# Patient Record
Sex: MALE | Race: OTHER | NOT HISPANIC OR LATINO | URBAN - METROPOLITAN AREA
[De-identification: names, ages, dates, MRNs, and addresses within clinical notes are randomized per-mention and may not be internally consistent; named-entity substitution may affect disease eponyms.]

---

## 2023-10-21 ENCOUNTER — INPATIENT (INPATIENT)
Facility: HOSPITAL | Age: 58
LOS: 10 days | Discharge: HOME | DRG: 981 | End: 2023-11-01
Attending: PSYCHIATRY & NEUROLOGY | Admitting: STUDENT IN AN ORGANIZED HEALTH CARE EDUCATION/TRAINING PROGRAM
Payer: COMMERCIAL

## 2023-10-21 VITALS
WEIGHT: 199.96 LBS | RESPIRATION RATE: 16 BRPM | HEART RATE: 78 BPM | TEMPERATURE: 98 F | OXYGEN SATURATION: 96 % | HEIGHT: 72 IN | DIASTOLIC BLOOD PRESSURE: 152 MMHG | SYSTOLIC BLOOD PRESSURE: 200 MMHG

## 2023-10-21 LAB
ANION GAP SERPL CALC-SCNC: 13 MMOL/L — SIGNIFICANT CHANGE UP (ref 5–17)
ANION GAP SERPL CALC-SCNC: 13 MMOL/L — SIGNIFICANT CHANGE UP (ref 5–17)
APPEARANCE UR: CLEAR — SIGNIFICANT CHANGE UP
APPEARANCE UR: CLEAR — SIGNIFICANT CHANGE UP
BASOPHILS # BLD AUTO: 0.04 K/UL — SIGNIFICANT CHANGE UP (ref 0–0.2)
BASOPHILS # BLD AUTO: 0.04 K/UL — SIGNIFICANT CHANGE UP (ref 0–0.2)
BASOPHILS NFR BLD AUTO: 0.5 % — SIGNIFICANT CHANGE UP (ref 0–2)
BASOPHILS NFR BLD AUTO: 0.5 % — SIGNIFICANT CHANGE UP (ref 0–2)
BILIRUB UR-MCNC: NEGATIVE — SIGNIFICANT CHANGE UP
BILIRUB UR-MCNC: NEGATIVE — SIGNIFICANT CHANGE UP
BUN SERPL-MCNC: 11 MG/DL — SIGNIFICANT CHANGE UP (ref 7–23)
BUN SERPL-MCNC: 11 MG/DL — SIGNIFICANT CHANGE UP (ref 7–23)
CALCIUM SERPL-MCNC: 9.7 MG/DL — SIGNIFICANT CHANGE UP (ref 8.4–10.5)
CALCIUM SERPL-MCNC: 9.7 MG/DL — SIGNIFICANT CHANGE UP (ref 8.4–10.5)
CHLORIDE SERPL-SCNC: 104 MMOL/L — SIGNIFICANT CHANGE UP (ref 96–108)
CHLORIDE SERPL-SCNC: 104 MMOL/L — SIGNIFICANT CHANGE UP (ref 96–108)
CO2 SERPL-SCNC: 24 MMOL/L — SIGNIFICANT CHANGE UP (ref 22–31)
CO2 SERPL-SCNC: 24 MMOL/L — SIGNIFICANT CHANGE UP (ref 22–31)
COLOR SPEC: YELLOW — SIGNIFICANT CHANGE UP
COLOR SPEC: YELLOW — SIGNIFICANT CHANGE UP
CREAT SERPL-MCNC: 0.94 MG/DL — SIGNIFICANT CHANGE UP (ref 0.5–1.3)
CREAT SERPL-MCNC: 0.94 MG/DL — SIGNIFICANT CHANGE UP (ref 0.5–1.3)
DIFF PNL FLD: NEGATIVE — SIGNIFICANT CHANGE UP
DIFF PNL FLD: NEGATIVE — SIGNIFICANT CHANGE UP
EGFR: 93 ML/MIN/1.73M2 — SIGNIFICANT CHANGE UP
EGFR: 93 ML/MIN/1.73M2 — SIGNIFICANT CHANGE UP
EOSINOPHIL # BLD AUTO: 0.06 K/UL — SIGNIFICANT CHANGE UP (ref 0–0.5)
EOSINOPHIL # BLD AUTO: 0.06 K/UL — SIGNIFICANT CHANGE UP (ref 0–0.5)
EOSINOPHIL NFR BLD AUTO: 0.7 % — SIGNIFICANT CHANGE UP (ref 0–6)
EOSINOPHIL NFR BLD AUTO: 0.7 % — SIGNIFICANT CHANGE UP (ref 0–6)
GLUCOSE BLDC GLUCOMTR-MCNC: 114 MG/DL — HIGH (ref 70–99)
GLUCOSE BLDC GLUCOMTR-MCNC: 114 MG/DL — HIGH (ref 70–99)
GLUCOSE SERPL-MCNC: 106 MG/DL — HIGH (ref 70–99)
GLUCOSE SERPL-MCNC: 106 MG/DL — HIGH (ref 70–99)
GLUCOSE UR QL: NEGATIVE MG/DL — SIGNIFICANT CHANGE UP
GLUCOSE UR QL: NEGATIVE MG/DL — SIGNIFICANT CHANGE UP
HCT VFR BLD CALC: 46.6 % — SIGNIFICANT CHANGE UP (ref 39–50)
HCT VFR BLD CALC: 46.6 % — SIGNIFICANT CHANGE UP (ref 39–50)
HGB BLD-MCNC: 16.7 G/DL — SIGNIFICANT CHANGE UP (ref 13–17)
HGB BLD-MCNC: 16.7 G/DL — SIGNIFICANT CHANGE UP (ref 13–17)
IMM GRANULOCYTES NFR BLD AUTO: 0.2 % — SIGNIFICANT CHANGE UP (ref 0–0.9)
IMM GRANULOCYTES NFR BLD AUTO: 0.2 % — SIGNIFICANT CHANGE UP (ref 0–0.9)
KETONES UR-MCNC: NEGATIVE MG/DL — SIGNIFICANT CHANGE UP
KETONES UR-MCNC: NEGATIVE MG/DL — SIGNIFICANT CHANGE UP
LEUKOCYTE ESTERASE UR-ACNC: NEGATIVE — SIGNIFICANT CHANGE UP
LEUKOCYTE ESTERASE UR-ACNC: NEGATIVE — SIGNIFICANT CHANGE UP
LYMPHOCYTES # BLD AUTO: 1.76 K/UL — SIGNIFICANT CHANGE UP (ref 1–3.3)
LYMPHOCYTES # BLD AUTO: 1.76 K/UL — SIGNIFICANT CHANGE UP (ref 1–3.3)
LYMPHOCYTES # BLD AUTO: 21.6 % — SIGNIFICANT CHANGE UP (ref 13–44)
LYMPHOCYTES # BLD AUTO: 21.6 % — SIGNIFICANT CHANGE UP (ref 13–44)
MCHC RBC-ENTMCNC: 32.7 PG — SIGNIFICANT CHANGE UP (ref 27–34)
MCHC RBC-ENTMCNC: 32.7 PG — SIGNIFICANT CHANGE UP (ref 27–34)
MCHC RBC-ENTMCNC: 35.8 GM/DL — SIGNIFICANT CHANGE UP (ref 32–36)
MCHC RBC-ENTMCNC: 35.8 GM/DL — SIGNIFICANT CHANGE UP (ref 32–36)
MCV RBC AUTO: 91.2 FL — SIGNIFICANT CHANGE UP (ref 80–100)
MCV RBC AUTO: 91.2 FL — SIGNIFICANT CHANGE UP (ref 80–100)
MONOCYTES # BLD AUTO: 0.54 K/UL — SIGNIFICANT CHANGE UP (ref 0–0.9)
MONOCYTES # BLD AUTO: 0.54 K/UL — SIGNIFICANT CHANGE UP (ref 0–0.9)
MONOCYTES NFR BLD AUTO: 6.6 % — SIGNIFICANT CHANGE UP (ref 2–14)
MONOCYTES NFR BLD AUTO: 6.6 % — SIGNIFICANT CHANGE UP (ref 2–14)
NEUTROPHILS # BLD AUTO: 5.74 K/UL — SIGNIFICANT CHANGE UP (ref 1.8–7.4)
NEUTROPHILS # BLD AUTO: 5.74 K/UL — SIGNIFICANT CHANGE UP (ref 1.8–7.4)
NEUTROPHILS NFR BLD AUTO: 70.4 % — SIGNIFICANT CHANGE UP (ref 43–77)
NEUTROPHILS NFR BLD AUTO: 70.4 % — SIGNIFICANT CHANGE UP (ref 43–77)
NITRITE UR-MCNC: NEGATIVE — SIGNIFICANT CHANGE UP
NITRITE UR-MCNC: NEGATIVE — SIGNIFICANT CHANGE UP
NRBC # BLD: 0 /100 WBCS — SIGNIFICANT CHANGE UP (ref 0–0)
NRBC # BLD: 0 /100 WBCS — SIGNIFICANT CHANGE UP (ref 0–0)
PH UR: 7 — SIGNIFICANT CHANGE UP (ref 5–8)
PH UR: 7 — SIGNIFICANT CHANGE UP (ref 5–8)
PLATELET # BLD AUTO: 279 K/UL — SIGNIFICANT CHANGE UP (ref 150–400)
PLATELET # BLD AUTO: 279 K/UL — SIGNIFICANT CHANGE UP (ref 150–400)
POTASSIUM SERPL-MCNC: 4 MMOL/L — SIGNIFICANT CHANGE UP (ref 3.5–5.3)
POTASSIUM SERPL-MCNC: 4 MMOL/L — SIGNIFICANT CHANGE UP (ref 3.5–5.3)
POTASSIUM SERPL-SCNC: 4 MMOL/L — SIGNIFICANT CHANGE UP (ref 3.5–5.3)
POTASSIUM SERPL-SCNC: 4 MMOL/L — SIGNIFICANT CHANGE UP (ref 3.5–5.3)
PROT UR-MCNC: NEGATIVE MG/DL — SIGNIFICANT CHANGE UP
PROT UR-MCNC: NEGATIVE MG/DL — SIGNIFICANT CHANGE UP
RBC # BLD: 5.11 M/UL — SIGNIFICANT CHANGE UP (ref 4.2–5.8)
RBC # BLD: 5.11 M/UL — SIGNIFICANT CHANGE UP (ref 4.2–5.8)
RBC # FLD: 12.3 % — SIGNIFICANT CHANGE UP (ref 10.3–14.5)
RBC # FLD: 12.3 % — SIGNIFICANT CHANGE UP (ref 10.3–14.5)
SODIUM SERPL-SCNC: 141 MMOL/L — SIGNIFICANT CHANGE UP (ref 135–145)
SODIUM SERPL-SCNC: 141 MMOL/L — SIGNIFICANT CHANGE UP (ref 135–145)
SP GR SPEC: 1.02 — SIGNIFICANT CHANGE UP (ref 1–1.03)
SP GR SPEC: 1.02 — SIGNIFICANT CHANGE UP (ref 1–1.03)
TROPONIN T, HIGH SENSITIVITY RESULT: 9 NG/L — SIGNIFICANT CHANGE UP (ref 0–51)
TROPONIN T, HIGH SENSITIVITY RESULT: 9 NG/L — SIGNIFICANT CHANGE UP (ref 0–51)
UROBILINOGEN FLD QL: 0.2 MG/DL — SIGNIFICANT CHANGE UP (ref 0.2–1)
UROBILINOGEN FLD QL: 0.2 MG/DL — SIGNIFICANT CHANGE UP (ref 0.2–1)
WBC # BLD: 8.16 K/UL — SIGNIFICANT CHANGE UP (ref 3.8–10.5)
WBC # BLD: 8.16 K/UL — SIGNIFICANT CHANGE UP (ref 3.8–10.5)
WBC # FLD AUTO: 8.16 K/UL — SIGNIFICANT CHANGE UP (ref 3.8–10.5)
WBC # FLD AUTO: 8.16 K/UL — SIGNIFICANT CHANGE UP (ref 3.8–10.5)

## 2023-10-21 PROCEDURE — 70498 CT ANGIOGRAPHY NECK: CPT | Mod: 26,MA

## 2023-10-21 PROCEDURE — 70496 CT ANGIOGRAPHY HEAD: CPT | Mod: 26,MA

## 2023-10-21 PROCEDURE — 99285 EMERGENCY DEPT VISIT HI MDM: CPT

## 2023-10-21 PROCEDURE — 93010 ELECTROCARDIOGRAM REPORT: CPT

## 2023-10-21 PROCEDURE — 70450 CT HEAD/BRAIN W/O DYE: CPT | Mod: 26,76,59

## 2023-10-21 PROCEDURE — 0042T: CPT

## 2023-10-21 RX ORDER — ENOXAPARIN SODIUM 100 MG/ML
40 INJECTION SUBCUTANEOUS EVERY 24 HOURS
Refills: 0 | Status: DISCONTINUED | OUTPATIENT
Start: 2023-10-21 | End: 2023-10-24

## 2023-10-21 RX ORDER — INFLUENZA VIRUS VACCINE 15; 15; 15; 15 UG/.5ML; UG/.5ML; UG/.5ML; UG/.5ML
0.5 SUSPENSION INTRAMUSCULAR ONCE
Refills: 0 | Status: COMPLETED | OUTPATIENT
Start: 2023-10-21 | End: 2023-10-21

## 2023-10-21 RX ORDER — SODIUM CHLORIDE 9 MG/ML
1000 INJECTION INTRAMUSCULAR; INTRAVENOUS; SUBCUTANEOUS ONCE
Refills: 0 | Status: COMPLETED | OUTPATIENT
Start: 2023-10-21 | End: 2023-10-21

## 2023-10-21 RX ORDER — ASPIRIN/CALCIUM CARB/MAGNESIUM 324 MG
81 TABLET ORAL DAILY
Refills: 0 | Status: DISCONTINUED | OUTPATIENT
Start: 2023-10-21 | End: 2023-10-22

## 2023-10-21 RX ORDER — MECLIZINE HCL 12.5 MG
25 TABLET ORAL ONCE
Refills: 0 | Status: COMPLETED | OUTPATIENT
Start: 2023-10-21 | End: 2023-10-21

## 2023-10-21 RX ORDER — HYDRALAZINE HCL 50 MG
20 TABLET ORAL ONCE
Refills: 0 | Status: COMPLETED | OUTPATIENT
Start: 2023-10-21 | End: 2023-10-21

## 2023-10-21 RX ORDER — LABETALOL HCL 100 MG
10 TABLET ORAL ONCE
Refills: 0 | Status: COMPLETED | OUTPATIENT
Start: 2023-10-21 | End: 2023-10-21

## 2023-10-21 RX ORDER — NICARDIPINE HYDROCHLORIDE 30 MG/1
5 CAPSULE, EXTENDED RELEASE ORAL
Qty: 40 | Refills: 0 | Status: DISCONTINUED | OUTPATIENT
Start: 2023-10-21 | End: 2023-10-22

## 2023-10-21 RX ORDER — LABETALOL HCL 100 MG
20 TABLET ORAL ONCE
Refills: 0 | Status: COMPLETED | OUTPATIENT
Start: 2023-10-21 | End: 2023-10-21

## 2023-10-21 RX ORDER — AMLODIPINE BESYLATE 2.5 MG/1
2.5 TABLET ORAL DAILY
Refills: 0 | Status: DISCONTINUED | OUTPATIENT
Start: 2023-10-21 | End: 2023-10-22

## 2023-10-21 RX ORDER — NICARDIPINE HYDROCHLORIDE 30 MG/1
5 CAPSULE, EXTENDED RELEASE ORAL
Qty: 40 | Refills: 0 | Status: DISCONTINUED | OUTPATIENT
Start: 2023-10-21 | End: 2023-10-21

## 2023-10-21 RX ORDER — CLOPIDOGREL BISULFATE 75 MG/1
75 TABLET, FILM COATED ORAL DAILY
Refills: 0 | Status: DISCONTINUED | OUTPATIENT
Start: 2023-10-21 | End: 2023-10-22

## 2023-10-21 RX ORDER — ATORVASTATIN CALCIUM 80 MG/1
80 TABLET, FILM COATED ORAL AT BEDTIME
Refills: 0 | Status: DISCONTINUED | OUTPATIENT
Start: 2023-10-21 | End: 2023-10-31

## 2023-10-21 RX ORDER — HYDRALAZINE HCL 50 MG
10 TABLET ORAL ONCE
Refills: 0 | Status: COMPLETED | OUTPATIENT
Start: 2023-10-21 | End: 2023-10-21

## 2023-10-21 RX ADMIN — Medication 25 MILLIGRAM(S): at 12:02

## 2023-10-21 RX ADMIN — Medication 81 MILLIGRAM(S): at 18:51

## 2023-10-21 RX ADMIN — Medication 20 MILLIGRAM(S): at 14:40

## 2023-10-21 RX ADMIN — Medication 10 MILLIGRAM(S): at 14:11

## 2023-10-21 RX ADMIN — NICARDIPINE HYDROCHLORIDE 25 MG/HR: 30 CAPSULE, EXTENDED RELEASE ORAL at 15:52

## 2023-10-21 RX ADMIN — CLOPIDOGREL BISULFATE 75 MILLIGRAM(S): 75 TABLET, FILM COATED ORAL at 18:51

## 2023-10-21 RX ADMIN — ENOXAPARIN SODIUM 40 MILLIGRAM(S): 100 INJECTION SUBCUTANEOUS at 18:44

## 2023-10-21 RX ADMIN — SODIUM CHLORIDE 1000 MILLILITER(S): 9 INJECTION INTRAMUSCULAR; INTRAVENOUS; SUBCUTANEOUS at 12:02

## 2023-10-21 RX ADMIN — Medication 10 MILLIGRAM(S): at 12:02

## 2023-10-21 RX ADMIN — ATORVASTATIN CALCIUM 80 MILLIGRAM(S): 80 TABLET, FILM COATED ORAL at 21:43

## 2023-10-21 RX ADMIN — NICARDIPINE HYDROCHLORIDE 25 MG/HR: 30 CAPSULE, EXTENDED RELEASE ORAL at 18:59

## 2023-10-21 RX ADMIN — Medication 20 MILLIGRAM(S): at 13:04

## 2023-10-21 NOTE — H&P ADULT - NSHPLABSRESULTS_GEN_ALL_CORE
LABS:                         16.7   8.16  )-----------( 279      ( 21 Oct 2023 11:45 )             46.6     10-    141  |  104  |  11  ----------------------------<  106<H>  4.0   |  24  |  0.94    Ca    9.7      21 Oct 2023 11:45        Urinalysis Basic - ( 21 Oct 2023 11:45 )    Color: Yellow / Appearance: Clear / S.019 / pH: x  Gluc: 106 mg/dL / Ketone: Negative mg/dL  / Bili: Negative / Urobili: 0.2 mg/dL   Blood: x / Protein: Negative mg/dL / Nitrite: Negative   Leuk Esterase: Negative / RBC: x / WBC x   Sq Epi: x / Non Sq Epi: x / Bacteria: x        RADIOLOGY, EKG & ADDITIONAL TESTS: Reviewed.

## 2023-10-21 NOTE — H&P ADULT - HISTORY OF PRESENT ILLNESS
HPI: 57y Male with PMHx of HTN (noncompliant with meds) originally presented to [facility] ED c/o room spinning dizziness x 1 week worse with head movement. CTH with no acute intracranial injury. Microvascular disease. Chronic lacunar infarcts. CTA head and neck with mild dot-appearance to the bilateral ACAs and MCAs that may represent vasculitis. Short severe segmental narrowing of the left proximal A2 segment of the anterior cerebral artery. Severe focal narrowing of the left M2/M3 segment of the middle cerebral artery. Normal CTA of the extracranial circulation. No evidence of carotid stenosis. Was given total of 30mg labetalol and 30mg hydralazine with SBP still 230 and so cardene gtt was started. Given imaging findings, stroke was consulted. Upon initial examination, NIHSS 0. Was awaiting MICU bed for admission under the stroke service for a stroke workup.    A few mins later received call from patient's nurse that patient was now confused and was not speaking as much. BP at the time 130s. Upon my eval, NIHSS 2 for confusion/aphasia (unable to say where he is or the year) and hence stroke code was called. CT imaging unchanged. Exam slowly improving with SBP in the low 200s. Discussed case with Dr. Myles, since exam is improving and no change in imaging, likely symptoms are due to significant drop in BP.    T(C): 36.6 (10-21-23 @ 11:37), Max: 36.6 (10-21-23 @ 11:37)  HR: 95 (10-21-23 @ 18:20) (78 - 112)  BP: 233/138 (10-21-23 @ 18:20) (130/89 - 236/141)  RR: 20 (10-21-23 @ 18:20) (16 - 28)  SpO2: 98% (10-21-23 @ 18:20) (96% - 100%)    PAST MEDICAL & SURGICAL HISTORY:  HTN (hypertension)    FAMILY HISTORY:

## 2023-10-21 NOTE — CONSULT NOTE ADULT - SUBJECTIVE AND OBJECTIVE BOX
**STROKE CONSULT NOTE**    HPI: 57y Male with PMHx of HTN (noncompliant with meds) originally presented to [facility] ED c/o room spinning dizziness x 1 week worse with head movement. CTH with no acute intracranial injury. Microvascular disease. Chronic lacunar infarcts. CTA head and neck with mild dot-appearance to the bilateral ACAs and MCAs that may represent vasculitis. Short severe segmental narrowing of the left proximal A2 segment of the anterior cerebral artery. Severe focal narrowing of the left M2/M3 segment of the middle cerebral artery. Normal CTA of the extracranial circulation. No evidence of carotid stenosis. Was given total of 30mg labetalol and 30mg hydralazine with SBP still 230 and so cardene gtt was started. Given imaging findings, stroke was consulted. Upon initial examination, NIHSS 0. Was awaiting MICU bed for admission under the stroke service for a stroke workup.    A few mins later received call from patient's nurse that patient was now confused and was not speaking as much. BP at the time 130s. Upon my eval, NIHSS 2 for confusion/aphasia (unable to say where he is or the year) and hence stroke code was called. CT imaging unchanged. Exam slowly improving with SBP in the low 200s. Discussed case with Dr. Myles, since exam is improving and no change in imaging, likely symptoms are due to significant drop in BP.    T(C): 36.6 (10-21-23 @ 11:37), Max: 36.6 (10-21-23 @ 11:37)  HR: 95 (10-21-23 @ 18:20) (78 - 112)  BP: 233/138 (10-21-23 @ 18:20) (130/89 - 236/141)  RR: 20 (10-21-23 @ 18:20) (16 - 28)  SpO2: 98% (10-21-23 @ 18:20) (96% - 100%)    PAST MEDICAL & SURGICAL HISTORY:  HTN (hypertension)    FAMILY HISTORY:    ROS:   see HPI    MEDICATIONS  (STANDING):  amLODIPine   Tablet 2.5 milliGRAM(s) Oral daily  aspirin enteric coated 81 milliGRAM(s) Oral daily  atorvastatin 80 milliGRAM(s) Oral at bedtime  clopidogrel Tablet 75 milliGRAM(s) Oral daily  enoxaparin Injectable 40 milliGRAM(s) SubCutaneous every 24 hours  niCARdipine Infusion 5 mG/Hr (25 mL/Hr) IV Continuous <Continuous>    MEDICATIONS  (PRN):    Allergies    No Known Allergies    Intolerances      Vital Signs Last 24 Hrs  T(C): 36.6 (21 Oct 2023 11:37), Max: 36.6 (21 Oct 2023 11:37)  T(F): 97.8 (21 Oct 2023 11:37), Max: 97.8 (21 Oct 2023 11:37)  HR: 95 (21 Oct 2023 18:20) (78 - 112)  BP: 233/138 (21 Oct 2023 18:20) (130/89 - 236/141)  BP(mean): 176 (21 Oct 2023 18:20) (176 - 182)  RR: 20 (21 Oct 2023 18:20) (16 - 28)  SpO2: 98% (21 Oct 2023 18:20) (96% - 100%)    Parameters below as of 21 Oct 2023 18:20  Patient On (Oxygen Delivery Method): room air    Neurologic: at time of initial eval  -Mental status: Awake, alert, oriented to person, place, and time. Speech is fluent with intact naming, repetition, and comprehension, no dysarthria. Recent and remote memory intact. Follows commands. Attention/concentration intact.   -Cranial nerves:   II: Visual fields are full to confrontation.  III, IV, VI: Extraocular movements are intact without nystagmus. Pupils equally round and reactive to light  V:  Facial sensation V1-V3 equal and intact   VII: Face is symmetric with normal eye closure and smile  VIII: Hearing is bilaterally intact to finger rub  IX, X: Uvula is midline and soft palate rises symmetrically  XI: Head turning and shoulder shrug are intact.  XII: Tongue protrudes midline  Motor: Normal bulk and tone. No pronator drift. Strength bilateral upper extremity 5/5, bilateral lower extremities 5/5.  Sensation: Intact to light touch bilaterally. No neglect or extinction on double simultaneous testing.  Coordination: No dysmetria on finger-to-nose bilaterally  Gait: Narrow gait and steady    NIHSS: 0    Neurologic: at time of stroke code  -Mental status: Awake, alert, however staring midline and appearing confused. Oriented to person only. Speech with pauses, unable to say where he is or what year it is. No dysarthria. Recent and remote memory not intact. Follows simple commands. Attention/concentration diminished.   -Cranial nerves:   II: Visual fields are full to confrontation.  III, IV, VI: Extraocular movements are intact without nystagmus. Pupils equally round and reactive to light  V:  Facial sensation V1-V3 equal and intact   VII: Face is symmetric with normal eye closure and smile  VIII: Hearing is bilaterally intact to finger rub  IX, X: Uvula is midline and soft palate rises symmetrically  XI: Head turning and shoulder shrug are intact.  XII: Tongue protrudes midline  Motor: Normal bulk and tone. No pronator drift. Strength bilateral upper extremity 5/5, bilateral lower extremities 5/5.  Sensation: Intact to light touch bilaterally. No neglect or extinction on double simultaneous testing.  Coordination: No dysmetria on finger-to-nose bilaterally  Gait: Narrow gait and steady    NIHSS: 2    Neurologic: after stroke code with increase in BP  -Mental status: Awake, alert, appearing less confused, oriented to person, place, and time. Speech is fluent with intact naming, repetition, and comprehension, no dysarthria. Recent and remote memory intact. Follows commands. Attention/concentration improving.   -Cranial nerves:   II: Visual fields are full to confrontation.  III, IV, VI: Extraocular movements are intact without nystagmus. Pupils equally round and reactive to light  V:  Facial sensation V1-V3 equal and intact   VII: Face is symmetric with normal eye closure and smile  VIII: Hearing is bilaterally intact to finger rub  IX, X: Uvula is midline and soft palate rises symmetrically  XI: Head turning and shoulder shrug are intact.  XII: Tongue protrudes midline  Motor: Normal bulk and tone. No pronator drift. Strength bilateral upper extremity 5/5, bilateral lower extremities 5/5.  Sensation: Intact to light touch bilaterally. No neglect or extinction on double simultaneous testing.  Coordination: No dysmetria on finger-to-nose bilaterally  Gait: Narrow gait and steady    NIHSS: 0    Fingerstick Blood Glucose: CAPILLARY BLOOD GLUCOSE  114 (21 Oct 2023 18:17)      POCT Blood Glucose.: 114 mg/dL (21 Oct 2023 17:04)    LABS:                        16.7   8.16  )-----------( 279      ( 21 Oct 2023 11:45 )             46.6     10-    141  |  104  |  11  ----------------------------<  106<H>  4.0   |  24  |  0.94    Ca    9.7      21 Oct 2023 11:45      Urinalysis Basic - ( 21 Oct 2023 11:45 )    Color: Yellow / Appearance: Clear / S.019 / pH: x  Gluc: 106 mg/dL / Ketone: Negative mg/dL  / Bili: Negative / Urobili: 0.2 mg/dL   Blood: x / Protein: Negative mg/dL / Nitrite: Negative   Leuk Esterase: Negative / RBC: x / WBC x   Sq Epi: x / Non Sq Epi: x / Bacteria: x      RADIOLOGY & ADDITIONAL STUDIES:  reviewed    -----------------------------------------------------------------------------------------------------------------  IV-tenecteplase (Y/N):    no, exam improving and no focal deficits on exam

## 2023-10-21 NOTE — ED PROVIDER NOTE - CLINICAL SUMMARY MEDICAL DECISION MAKING FREE TEXT BOX
56 yo with dizziness, hypertensive here in setting of med noncompliance, 1 week of symptoms, + nystagmus, sways on romberg. CVA on ddx, outside therapeutic window, will obtain CT imaging. Also symptoms may be peripheral in nature, will trial meclizine. Hypertensive here, will treat BP, ro hypertensive urgency/emergency

## 2023-10-21 NOTE — ED PROVIDER NOTE - PHYSICAL EXAMINATION
General: Well appearing, in no acute distress  HEENT: Normocephalic, atraumatic, extraocular movements intact  CV: Regular rate  Pulm: No respiratory distress, no tachypnea  Abd: Flat, no gross distension  Ext: warm and well perfused  Skin: No gross rashes or lesions  Neuro: Alert and oriented, moving all extremities, motor 5/5, sensation intact bilaterally, no dysmetria, + horizontal nysyagmus

## 2023-10-21 NOTE — ED PROVIDER NOTE - NS ED ROS FT
Constitutional: No fever or chills  Eyes: No discharge or drainage  Ears, Nose, Mouth, Throat: No nasal discharge, no sore throat  Cardiovascular: no chest pain, no palpitations  Respiratory: No shortness of breath, no cough  Gastrointestinal: No nausea or vomiting, no abdominal pain, no diarrhea or constipation  Musculoskeletal: No joint pain, no swelling  Skin: No rashes or lesions  Neurological: No numbness, weakness, tingling, no headache, + dizziness

## 2023-10-21 NOTE — H&P ADULT - ASSESSMENT
57y Male with PMHx of HTN (noncompliant with meds) originally presented to [facility] ED c/o room spinning dizziness x 1 week worse with head movement. CTH with no acute intracranial injury. Microvascular disease. Chronic lacunar infarcts. CTA head and neck with mild dot-appearance to the bilateral ACAs and MCAs that may represent vasculitis. Short severe segmental narrowing of the left proximal A2 segment of the anterior cerebral artery. Severe focal narrowing of the left M2/M3 segment of the middle cerebral artery. Normal CTA of the extracranial circulation. No evidence of carotid stenosis. Was given total of 30mg labetalol and 30mg hydralazine with SBP still 230s and so cardene gtt was started. Given imaging findings, stroke was consulted. Upon initial examination, NIHSS 0. Was awaiting MICU bed for admission under the stroke service for a stroke workup. Patient then became confused/aphasic in setting of SBP 130s. Stroke code was called. CT imaging unchanged. Exam slowly improving with SBP in the low 200s. Discussed case with Dr. Myles, since exam is improving and no change in imaging, likely symptoms are due to significant drop in BP. Admitted to MICU for stroke w/u with SBP goal 180-220.    Neuro  #CVA workup  - continue aspirin 81mg and plavix 75mg daily  - continue atorvastatin 80mg daily  - q1hr stroke neuro checks and vitals  - obtain MRA brain  - Stroke Code HCT Results: No acute intracranial injury. Microvascular disease. Chronic lacunar infarcts.  - Stroke Code CTA Results: Mild dot-appearance to the bilateral ACAs and MCAs that may represent vasculitis. Short severe segmental narrowing of the left proximal A2 segment of the anterior cerebral artery, unchanged. Severe focal narrowing of the left M2/M3 segment of the middle cerebral artery, unchanged.  - Stroke education  - vasculitis panel ordered  - consider DSA    Cards  #HTN  - SBP goal 180-220  - okay to start cardene 5mg if SBP >220  - amlodipine 2.5mg started to bridge with cardene  - does not take home BP meds, does not know what they are  - obtain TTE with bubble    #HLD  - high dose statin as above in CVA  - LDL results: pending    Pulm  - call provider if SPO2 < 94%    GI  #Nutrition/Fluids/Electrolytes   - replete K<4 and Mg <2  - Diet: reg diet  - IVF: n/a    Renal  - trend BMP    Infectious Disease  - amy    Endocrine  - A1C results: pending  - TSH results: pending    DVT Prophylaxis  - lovenox sq for DVT prophylaxis   - SCDs for DVT prophylaxis     Dispo: MICU     Discussed daily hospital plans and goals with patient.     Discussed with Neurology Attending Dr. Myles

## 2023-10-21 NOTE — CONSULT NOTE ADULT - ASSESSMENT
57y Male with PMHx of HTN (noncompliant with meds) originally presented to [facility] ED c/o room spinning dizziness x 1 week worse with head movement. CTH with no acute intracranial injury. Microvascular disease. Chronic lacunar infarcts. CTA head and neck with mild dot-appearance to the bilateral ACAs and MCAs that may represent vasculitis. Short severe segmental narrowing of the left proximal A2 segment of the anterior cerebral artery. Severe focal narrowing of the left M2/M3 segment of the middle cerebral artery. Normal CTA of the extracranial circulation. No evidence of carotid stenosis. Was given total of 30mg labetalol and 30mg hydralazine with SBP still 230s and so cardene gtt was started. Given imaging findings, stroke was consulted. Upon initial examination, NIHSS 0. Was awaiting MICU bed for admission under the stroke service for a stroke workup. Patient then became confused/aphasic in setting of SBP 130s. Stroke code was called. CT imaging unchanged. Exam slowly improving with SBP in the low 200s. Discussed case with Dr. Myles, since exam is improving and no change in imaging, likely symptoms are due to significant drop in BP. Admitted to MICU for stroke w/u with SBP goal 180-220.    Neuro  #CVA workup  - continue aspirin 81mg and plavix 75mg daily  - continue atorvastatin 80mg daily  - q1hr stroke neuro checks and vitals  - obtain MRA brain  - Stroke Code HCT Results: No acute intracranial injury. Microvascular disease. Chronic lacunar infarcts.  - Stroke Code CTA Results: Mild dot-appearance to the bilateral ACAs and MCAs that may represent vasculitis. Short severe segmental narrowing of the left proximal A2 segment of the anterior cerebral artery, unchanged. Severe focal narrowing of the left M2/M3 segment of the middle cerebral artery, unchanged.  - Stroke education  - vasculitis panel ordered  - consider DSA    Cards  #HTN  - SBP goal 180-220  - okay to start cardene 5mg if SBP >220  - amlodipine 2.5mg started to bridge with cardene  - does not take home BP meds, does not know what they are  - obtain TTE with bubble    #HLD  - high dose statin as above in CVA  - LDL results: pending    Pulm  - call provider if SPO2 < 94%    GI  #Nutrition/Fluids/Electrolytes   - replete K<4 and Mg <2  - Diet: reg diet  - IVF: n/a    Renal  - trend BMP    Infectious Disease  - amy    Endocrine  - A1C results: pending  - TSH results: pending    DVT Prophylaxis  - lovenox sq for DVT prophylaxis   - SCDs for DVT prophylaxis     Dispo: MICU     Discussed daily hospital plans and goals with patient.     Discussed with Neurology Attending Dr. Myles

## 2023-10-21 NOTE — ED ADULT NURSE NOTE - OBJECTIVE STATEMENT
57 y.o. Male BIBA c/o HTN. Per pt diagnosed with hypertension and is not compliant with medication. Has felt dizzy x1 week worse with standing. Denies CP, SOB, headache, vision changes, abd pain nvd, syncope, weakness. Placed on CCM upon arrival. Clinical upgrade to MD Cardona at bedside. Denie smoking, alcohol, drug use. denies other PMHx. Denies allergies.

## 2023-10-21 NOTE — ED ADULT NURSE REASSESSMENT NOTE - NS ED NURSE REASSESS COMMENT FT1
Pt found aphasic, not responding to questions in English or Korean. MD Cardona and NASH Jones from stroke team came to bedside and assess pt. Stroke code was called, CT done. Per PA, at this time no evidence of new infarct or stenosis. Pt at this time back to baseline, A+ox3, NIH 0, answering questions and following commands. Per PA pt should not be restarted on cardizem drip unless BP systolic is over 230 and start at 5mg/h.

## 2023-10-21 NOTE — ED PROVIDER NOTE - OBJECTIVE STATEMENT
58 yo m with ho htn, noncomplaint with meds, presenting with dizziness X 1 week. Pt endorsing 1 week of dizziness, intermittent, worst with standing, feeling off balanced. No headache or blurry vision, denies cp or sob or palpitations, no lightheadedness, no neck pain or neck stiffness, no numbness, weakness, tingling. No trauma. ROS as above.

## 2023-10-22 DIAGNOSIS — I77.6 ARTERITIS, UNSPECIFIED: ICD-10-CM

## 2023-10-22 DIAGNOSIS — I10 ESSENTIAL (PRIMARY) HYPERTENSION: ICD-10-CM

## 2023-10-22 LAB
A1C WITH ESTIMATED AVERAGE GLUCOSE RESULT: 5.7 % — HIGH (ref 4–5.6)
A1C WITH ESTIMATED AVERAGE GLUCOSE RESULT: 5.7 % — HIGH (ref 4–5.6)
AMPHET UR-MCNC: NEGATIVE — SIGNIFICANT CHANGE UP
AMPHET UR-MCNC: NEGATIVE — SIGNIFICANT CHANGE UP
ANION GAP SERPL CALC-SCNC: 12 MMOL/L — SIGNIFICANT CHANGE UP (ref 5–17)
ANION GAP SERPL CALC-SCNC: 12 MMOL/L — SIGNIFICANT CHANGE UP (ref 5–17)
BARBITURATES UR SCN-MCNC: NEGATIVE — SIGNIFICANT CHANGE UP
BARBITURATES UR SCN-MCNC: NEGATIVE — SIGNIFICANT CHANGE UP
BASOPHILS # BLD AUTO: 0.04 K/UL — SIGNIFICANT CHANGE UP (ref 0–0.2)
BASOPHILS # BLD AUTO: 0.04 K/UL — SIGNIFICANT CHANGE UP (ref 0–0.2)
BASOPHILS NFR BLD AUTO: 0.6 % — SIGNIFICANT CHANGE UP (ref 0–2)
BASOPHILS NFR BLD AUTO: 0.6 % — SIGNIFICANT CHANGE UP (ref 0–2)
BENZODIAZ UR-MCNC: NEGATIVE — SIGNIFICANT CHANGE UP
BENZODIAZ UR-MCNC: NEGATIVE — SIGNIFICANT CHANGE UP
BUN SERPL-MCNC: 11 MG/DL — SIGNIFICANT CHANGE UP (ref 7–23)
BUN SERPL-MCNC: 11 MG/DL — SIGNIFICANT CHANGE UP (ref 7–23)
CALCIUM SERPL-MCNC: 9.7 MG/DL — SIGNIFICANT CHANGE UP (ref 8.4–10.5)
CALCIUM SERPL-MCNC: 9.7 MG/DL — SIGNIFICANT CHANGE UP (ref 8.4–10.5)
CHLORIDE SERPL-SCNC: 104 MMOL/L — SIGNIFICANT CHANGE UP (ref 96–108)
CHLORIDE SERPL-SCNC: 104 MMOL/L — SIGNIFICANT CHANGE UP (ref 96–108)
CHOLEST SERPL-MCNC: 217 MG/DL — HIGH
CHOLEST SERPL-MCNC: 217 MG/DL — HIGH
CO2 SERPL-SCNC: 25 MMOL/L — SIGNIFICANT CHANGE UP (ref 22–31)
CO2 SERPL-SCNC: 25 MMOL/L — SIGNIFICANT CHANGE UP (ref 22–31)
COCAINE METAB.OTHER UR-MCNC: NEGATIVE — SIGNIFICANT CHANGE UP
COCAINE METAB.OTHER UR-MCNC: NEGATIVE — SIGNIFICANT CHANGE UP
CREAT SERPL-MCNC: 1.03 MG/DL — SIGNIFICANT CHANGE UP (ref 0.5–1.3)
CREAT SERPL-MCNC: 1.03 MG/DL — SIGNIFICANT CHANGE UP (ref 0.5–1.3)
CRP SERPL-MCNC: <3 MG/L — SIGNIFICANT CHANGE UP (ref 0–4)
CRP SERPL-MCNC: <3 MG/L — SIGNIFICANT CHANGE UP (ref 0–4)
EGFR: 84 ML/MIN/1.73M2 — SIGNIFICANT CHANGE UP
EGFR: 84 ML/MIN/1.73M2 — SIGNIFICANT CHANGE UP
EOSINOPHIL # BLD AUTO: 0.06 K/UL — SIGNIFICANT CHANGE UP (ref 0–0.5)
EOSINOPHIL # BLD AUTO: 0.06 K/UL — SIGNIFICANT CHANGE UP (ref 0–0.5)
EOSINOPHIL NFR BLD AUTO: 0.9 % — SIGNIFICANT CHANGE UP (ref 0–6)
EOSINOPHIL NFR BLD AUTO: 0.9 % — SIGNIFICANT CHANGE UP (ref 0–6)
ERYTHROCYTE [SEDIMENTATION RATE] IN BLOOD: 18 MM/HR — SIGNIFICANT CHANGE UP
ERYTHROCYTE [SEDIMENTATION RATE] IN BLOOD: 18 MM/HR — SIGNIFICANT CHANGE UP
ESTIMATED AVERAGE GLUCOSE: 117 MG/DL — HIGH (ref 68–114)
ESTIMATED AVERAGE GLUCOSE: 117 MG/DL — HIGH (ref 68–114)
GLUCOSE SERPL-MCNC: 108 MG/DL — HIGH (ref 70–99)
GLUCOSE SERPL-MCNC: 108 MG/DL — HIGH (ref 70–99)
HAV IGM SER-ACNC: SIGNIFICANT CHANGE UP
HAV IGM SER-ACNC: SIGNIFICANT CHANGE UP
HBV CORE AB SER-ACNC: SIGNIFICANT CHANGE UP
HBV CORE AB SER-ACNC: SIGNIFICANT CHANGE UP
HBV CORE IGM SER-ACNC: SIGNIFICANT CHANGE UP
HBV CORE IGM SER-ACNC: SIGNIFICANT CHANGE UP
HBV SURFACE AB SER-ACNC: SIGNIFICANT CHANGE UP
HBV SURFACE AB SER-ACNC: SIGNIFICANT CHANGE UP
HBV SURFACE AG SER-ACNC: SIGNIFICANT CHANGE UP
HBV SURFACE AG SER-ACNC: SIGNIFICANT CHANGE UP
HCT VFR BLD CALC: 44.5 % — SIGNIFICANT CHANGE UP (ref 39–50)
HCT VFR BLD CALC: 44.5 % — SIGNIFICANT CHANGE UP (ref 39–50)
HCV AB S/CO SERPL IA: 0.04 S/CO — SIGNIFICANT CHANGE UP
HCV AB S/CO SERPL IA: 0.04 S/CO — SIGNIFICANT CHANGE UP
HCV AB SERPL-IMP: SIGNIFICANT CHANGE UP
HCV AB SERPL-IMP: SIGNIFICANT CHANGE UP
HDLC SERPL-MCNC: 35 MG/DL — LOW
HDLC SERPL-MCNC: 35 MG/DL — LOW
HGB BLD-MCNC: 15.8 G/DL — SIGNIFICANT CHANGE UP (ref 13–17)
HGB BLD-MCNC: 15.8 G/DL — SIGNIFICANT CHANGE UP (ref 13–17)
HIV 1+2 AB+HIV1 P24 AG SERPL QL IA: SIGNIFICANT CHANGE UP
HIV 1+2 AB+HIV1 P24 AG SERPL QL IA: SIGNIFICANT CHANGE UP
IMM GRANULOCYTES NFR BLD AUTO: 0.2 % — SIGNIFICANT CHANGE UP (ref 0–0.9)
IMM GRANULOCYTES NFR BLD AUTO: 0.2 % — SIGNIFICANT CHANGE UP (ref 0–0.9)
LACTATE SERPL-SCNC: 1.6 MMOL/L — SIGNIFICANT CHANGE UP (ref 0.5–2)
LACTATE SERPL-SCNC: 1.6 MMOL/L — SIGNIFICANT CHANGE UP (ref 0.5–2)
LIPID PNL WITH DIRECT LDL SERPL: 156 MG/DL — HIGH
LIPID PNL WITH DIRECT LDL SERPL: 156 MG/DL — HIGH
LYMPHOCYTES # BLD AUTO: 1.63 K/UL — SIGNIFICANT CHANGE UP (ref 1–3.3)
LYMPHOCYTES # BLD AUTO: 1.63 K/UL — SIGNIFICANT CHANGE UP (ref 1–3.3)
LYMPHOCYTES # BLD AUTO: 24.6 % — SIGNIFICANT CHANGE UP (ref 13–44)
LYMPHOCYTES # BLD AUTO: 24.6 % — SIGNIFICANT CHANGE UP (ref 13–44)
MAGNESIUM SERPL-MCNC: 2 MG/DL — SIGNIFICANT CHANGE UP (ref 1.6–2.6)
MAGNESIUM SERPL-MCNC: 2 MG/DL — SIGNIFICANT CHANGE UP (ref 1.6–2.6)
MCHC RBC-ENTMCNC: 32.6 PG — SIGNIFICANT CHANGE UP (ref 27–34)
MCHC RBC-ENTMCNC: 32.6 PG — SIGNIFICANT CHANGE UP (ref 27–34)
MCHC RBC-ENTMCNC: 35.5 GM/DL — SIGNIFICANT CHANGE UP (ref 32–36)
MCHC RBC-ENTMCNC: 35.5 GM/DL — SIGNIFICANT CHANGE UP (ref 32–36)
MCV RBC AUTO: 91.9 FL — SIGNIFICANT CHANGE UP (ref 80–100)
MCV RBC AUTO: 91.9 FL — SIGNIFICANT CHANGE UP (ref 80–100)
METHADONE UR-MCNC: NEGATIVE — SIGNIFICANT CHANGE UP
METHADONE UR-MCNC: NEGATIVE — SIGNIFICANT CHANGE UP
MONOCYTES # BLD AUTO: 0.56 K/UL — SIGNIFICANT CHANGE UP (ref 0–0.9)
MONOCYTES # BLD AUTO: 0.56 K/UL — SIGNIFICANT CHANGE UP (ref 0–0.9)
MONOCYTES NFR BLD AUTO: 8.5 % — SIGNIFICANT CHANGE UP (ref 2–14)
MONOCYTES NFR BLD AUTO: 8.5 % — SIGNIFICANT CHANGE UP (ref 2–14)
NEUTROPHILS # BLD AUTO: 4.32 K/UL — SIGNIFICANT CHANGE UP (ref 1.8–7.4)
NEUTROPHILS # BLD AUTO: 4.32 K/UL — SIGNIFICANT CHANGE UP (ref 1.8–7.4)
NEUTROPHILS NFR BLD AUTO: 65.2 % — SIGNIFICANT CHANGE UP (ref 43–77)
NEUTROPHILS NFR BLD AUTO: 65.2 % — SIGNIFICANT CHANGE UP (ref 43–77)
NON HDL CHOLESTEROL: 182 MG/DL — HIGH
NON HDL CHOLESTEROL: 182 MG/DL — HIGH
NRBC # BLD: 0 /100 WBCS — SIGNIFICANT CHANGE UP (ref 0–0)
NRBC # BLD: 0 /100 WBCS — SIGNIFICANT CHANGE UP (ref 0–0)
OPIATES UR-MCNC: NEGATIVE — SIGNIFICANT CHANGE UP
OPIATES UR-MCNC: NEGATIVE — SIGNIFICANT CHANGE UP
PCP SPEC-MCNC: SIGNIFICANT CHANGE UP
PCP SPEC-MCNC: SIGNIFICANT CHANGE UP
PCP UR-MCNC: NEGATIVE — SIGNIFICANT CHANGE UP
PCP UR-MCNC: NEGATIVE — SIGNIFICANT CHANGE UP
PHOSPHATE SERPL-MCNC: 3.6 MG/DL — SIGNIFICANT CHANGE UP (ref 2.5–4.5)
PHOSPHATE SERPL-MCNC: 3.6 MG/DL — SIGNIFICANT CHANGE UP (ref 2.5–4.5)
PLATELET # BLD AUTO: 260 K/UL — SIGNIFICANT CHANGE UP (ref 150–400)
PLATELET # BLD AUTO: 260 K/UL — SIGNIFICANT CHANGE UP (ref 150–400)
POTASSIUM SERPL-MCNC: 3.7 MMOL/L — SIGNIFICANT CHANGE UP (ref 3.5–5.3)
POTASSIUM SERPL-MCNC: 3.7 MMOL/L — SIGNIFICANT CHANGE UP (ref 3.5–5.3)
POTASSIUM SERPL-SCNC: 3.7 MMOL/L — SIGNIFICANT CHANGE UP (ref 3.5–5.3)
POTASSIUM SERPL-SCNC: 3.7 MMOL/L — SIGNIFICANT CHANGE UP (ref 3.5–5.3)
RBC # BLD: 4.84 M/UL — SIGNIFICANT CHANGE UP (ref 4.2–5.8)
RBC # BLD: 4.84 M/UL — SIGNIFICANT CHANGE UP (ref 4.2–5.8)
RBC # FLD: 12.5 % — SIGNIFICANT CHANGE UP (ref 10.3–14.5)
RBC # FLD: 12.5 % — SIGNIFICANT CHANGE UP (ref 10.3–14.5)
RHEUMATOID FACT SERPL-ACNC: 20 IU/ML — HIGH (ref 0–13)
RHEUMATOID FACT SERPL-ACNC: 20 IU/ML — HIGH (ref 0–13)
SICKLE CELL DISEASE SCREENING TEST: NEGATIVE — SIGNIFICANT CHANGE UP
SICKLE CELL DISEASE SCREENING TEST: NEGATIVE — SIGNIFICANT CHANGE UP
SODIUM SERPL-SCNC: 141 MMOL/L — SIGNIFICANT CHANGE UP (ref 135–145)
SODIUM SERPL-SCNC: 141 MMOL/L — SIGNIFICANT CHANGE UP (ref 135–145)
THC UR QL: NEGATIVE — SIGNIFICANT CHANGE UP
THC UR QL: NEGATIVE — SIGNIFICANT CHANGE UP
TRIGL SERPL-MCNC: 128 MG/DL — SIGNIFICANT CHANGE UP
TRIGL SERPL-MCNC: 128 MG/DL — SIGNIFICANT CHANGE UP
TSH SERPL-MCNC: 5.09 UIU/ML — HIGH (ref 0.27–4.2)
TSH SERPL-MCNC: 5.09 UIU/ML — HIGH (ref 0.27–4.2)
WBC # BLD: 6.62 K/UL — SIGNIFICANT CHANGE UP (ref 3.8–10.5)
WBC # BLD: 6.62 K/UL — SIGNIFICANT CHANGE UP (ref 3.8–10.5)
WBC # FLD AUTO: 6.62 K/UL — SIGNIFICANT CHANGE UP (ref 3.8–10.5)
WBC # FLD AUTO: 6.62 K/UL — SIGNIFICANT CHANGE UP (ref 3.8–10.5)

## 2023-10-22 PROCEDURE — 70544 MR ANGIOGRAPHY HEAD W/O DYE: CPT | Mod: 26,59

## 2023-10-22 PROCEDURE — 70551 MRI BRAIN STEM W/O DYE: CPT | Mod: 26

## 2023-10-22 PROCEDURE — 99221 1ST HOSP IP/OBS SF/LOW 40: CPT

## 2023-10-22 RX ORDER — LABETALOL HCL 100 MG
100 TABLET ORAL DAILY
Refills: 0 | Status: DISCONTINUED | OUTPATIENT
Start: 2023-10-22 | End: 2023-10-22

## 2023-10-22 RX ORDER — LABETALOL HCL 100 MG
100 TABLET ORAL EVERY 12 HOURS
Refills: 0 | Status: DISCONTINUED | OUTPATIENT
Start: 2023-10-22 | End: 2023-10-22

## 2023-10-22 RX ORDER — LABETALOL HCL 100 MG
100 TABLET ORAL EVERY 12 HOURS
Refills: 0 | Status: DISCONTINUED | OUTPATIENT
Start: 2023-10-22 | End: 2023-10-30

## 2023-10-22 RX ORDER — LIDOCAINE HCL 20 MG/ML
10 VIAL (ML) INJECTION ONCE
Refills: 0 | Status: DISCONTINUED | OUTPATIENT
Start: 2023-10-22 | End: 2023-10-22

## 2023-10-22 RX ORDER — SODIUM CHLORIDE 9 MG/ML
1000 INJECTION INTRAMUSCULAR; INTRAVENOUS; SUBCUTANEOUS
Refills: 0 | Status: DISCONTINUED | OUTPATIENT
Start: 2023-10-22 | End: 2023-10-22

## 2023-10-22 RX ORDER — POTASSIUM CHLORIDE 20 MEQ
40 PACKET (EA) ORAL ONCE
Refills: 0 | Status: COMPLETED | OUTPATIENT
Start: 2023-10-22 | End: 2023-10-22

## 2023-10-22 RX ORDER — MECLIZINE HCL 12.5 MG
12.5 TABLET ORAL ONCE
Refills: 0 | Status: COMPLETED | OUTPATIENT
Start: 2023-10-22 | End: 2023-10-22

## 2023-10-22 RX ORDER — LABETALOL HCL 100 MG
100 TABLET ORAL ONCE
Refills: 0 | Status: COMPLETED | OUTPATIENT
Start: 2023-10-22 | End: 2023-10-22

## 2023-10-22 RX ADMIN — Medication 12.5 MILLIGRAM(S): at 13:47

## 2023-10-22 RX ADMIN — SODIUM CHLORIDE 75 MILLILITER(S): 9 INJECTION INTRAMUSCULAR; INTRAVENOUS; SUBCUTANEOUS at 05:17

## 2023-10-22 RX ADMIN — Medication 81 MILLIGRAM(S): at 12:25

## 2023-10-22 RX ADMIN — NICARDIPINE HYDROCHLORIDE 25 MG/HR: 30 CAPSULE, EXTENDED RELEASE ORAL at 12:25

## 2023-10-22 RX ADMIN — ENOXAPARIN SODIUM 40 MILLIGRAM(S): 100 INJECTION SUBCUTANEOUS at 18:17

## 2023-10-22 RX ADMIN — ATORVASTATIN CALCIUM 80 MILLIGRAM(S): 80 TABLET, FILM COATED ORAL at 21:42

## 2023-10-22 RX ADMIN — Medication 40 MILLIEQUIVALENT(S): at 12:26

## 2023-10-22 RX ADMIN — Medication 100 MILLIGRAM(S): at 15:02

## 2023-10-22 RX ADMIN — Medication 100 MILLIGRAM(S): at 23:08

## 2023-10-22 NOTE — OCCUPATIONAL THERAPY INITIAL EVALUATION ADULT - MODIFIED CLINICAL TEST OF SENSORY INTEGRATION IN BALANCE TEST
Pt. performed functional mob in hallway with CGA and no AD, no LOB, slightly unsteady reporting increased dizziness so pt. returned to room

## 2023-10-22 NOTE — OCCUPATIONAL THERAPY INITIAL EVALUATION ADULT - PERTINENT HX OF CURRENT PROBLEM, REHAB EVAL
7y Male with PMHx of HTN (noncompliant with meds) originally presented to [facility] ED c/o room spinning dizziness x 1 week worse with head movement. CTH with no acute intracranial injury. Microvascular disease. Chronic lacunar infarcts. CTA head and neck with mild dot-appearance to the bilateral ACAs and MCAs that may represent vasculitis. Short severe segmental narrowing of the left proximal A2 segment of the anterior cerebral artery. Severe focal narrowing of the left M2/M3 segment of the middle cerebral artery. Normal CTA of the extracranial circulation. No evidence of carotid stenosis. Was given total of 30mg labetalol and 30mg hydralazine with SBP still 230 and so cardene gtt was started.

## 2023-10-22 NOTE — PROGRESS NOTE ADULT - SUBJECTIVE AND OBJECTIVE BOX
Neurology Stroke Progress Note    INTERVAL HPI/OVERNIGHT EVENTS:  Patient seen and examined. Reports mild dizziness.     MEDICATIONS  (STANDING):  amLODIPine   Tablet 2.5 milliGRAM(s) Oral daily  aspirin enteric coated 81 milliGRAM(s) Oral daily  atorvastatin 80 milliGRAM(s) Oral at bedtime  clopidogrel Tablet 75 milliGRAM(s) Oral daily  enoxaparin Injectable 40 milliGRAM(s) SubCutaneous every 24 hours  influenza   Vaccine 0.5 milliLiter(s) IntraMuscular once  niCARdipine Infusion 5 mG/Hr (25 mL/Hr) IV Continuous <Continuous>  sodium chloride 0.9%. 1000 milliLiter(s) (75 mL/Hr) IV Continuous <Continuous>    MEDICATIONS  (PRN):      Allergies    No Known Allergies    Intolerances        Vital Signs Last 24 Hrs  T(C): 36.7 (22 Oct 2023 06:02), Max: 36.8 (21 Oct 2023 22:01)  T(F): 98.1 (22 Oct 2023 06:02), Max: 98.3 (21 Oct 2023 22:01)  HR: 67 (22 Oct 2023 08:00) (67 - 112)  BP: 143/106 (22 Oct 2023 08:00) (130/89 - 236/141)  BP(mean): 121 (22 Oct 2023 08:00) (113 - 182)  RR: 17 (22 Oct 2023 07:00) (16 - 28)  SpO2: 95% (22 Oct 2023 08:00) (92% - 100%)    Parameters below as of 22 Oct 2023 08:00  Patient On (Oxygen Delivery Method): room air      Neurologic:  -Mental status: Awake, alert, oriented to person, place, and time. Knows he is in the hospital because he has been dizzy. Speech is slow, however without dysarthria or aphasia. Follows commands. Attention/concentration intact.   -Cranial nerves:   II: Visual fields are full to confrontation.  III, IV, VI: right beating nystagmus on leftward gaze in both eyes. Pupils equally round and reactive to light  V:  Facial sensation V1-V3 equal and intact   VII: Face is symmetric with normal eye closure and smile  VIII: Hearing is bilaterally intact to finger rub  IX, X: Uvula is midline and soft palate rises symmetrically  XI: Head turning and shoulder shrug are intact.  XII: Tongue protrudes midline  Motor: Normal bulk and tone. No pronator drift. Strength bilateral upper extremity 5/5, bilateral lower extremities 5/5.  Sensation: Intact to light touch bilaterally. No neglect or extinction on double simultaneous testing.  Coordination: No dysmetria on finger-to-nose bilaterally. Slower with LUE however no past pointing.  Gait: deferred    LABS:                        15.8   6.62  )-----------( 260      ( 22 Oct 2023 05:30 )             44.5     10-22    141  |  104  |  11  ----------------------------<  108<H>  3.7   |  25  |  1.03    Ca    9.7      22 Oct 2023 05:30  Phos  3.6     10-22  Mg     2.0     10-22        Urinalysis Basic - ( 22 Oct 2023 05:30 )    Color: x / Appearance: x / SG: x / pH: x  Gluc: 108 mg/dL / Ketone: x  / Bili: x / Urobili: x   Blood: x / Protein: x / Nitrite: x   Leuk Esterase: x / RBC: x / WBC x   Sq Epi: x / Non Sq Epi: x / Bacteria: x        RADIOLOGY & ADDITIONAL TESTS:  reviewed   Neurology Stroke Progress Note    INTERVAL HPI/OVERNIGHT EVENTS:  Patient seen and examined. Reports mild dizziness.     MEDICATIONS  (STANDING):  amLODIPine   Tablet 2.5 milliGRAM(s) Oral daily  aspirin enteric coated 81 milliGRAM(s) Oral daily  atorvastatin 80 milliGRAM(s) Oral at bedtime  clopidogrel Tablet 75 milliGRAM(s) Oral daily  enoxaparin Injectable 40 milliGRAM(s) SubCutaneous every 24 hours  influenza   Vaccine 0.5 milliLiter(s) IntraMuscular once  niCARdipine Infusion 5 mG/Hr (25 mL/Hr) IV Continuous <Continuous>  sodium chloride 0.9%. 1000 milliLiter(s) (75 mL/Hr) IV Continuous <Continuous>    MEDICATIONS  (PRN):      Allergies    No Known Allergies    Intolerances        Vital Signs Last 24 Hrs  T(C): 36.7 (22 Oct 2023 06:02), Max: 36.8 (21 Oct 2023 22:01)  T(F): 98.1 (22 Oct 2023 06:02), Max: 98.3 (21 Oct 2023 22:01)  HR: 67 (22 Oct 2023 08:00) (67 - 112)  BP: 143/106 (22 Oct 2023 08:00) (130/89 - 236/141)  BP(mean): 121 (22 Oct 2023 08:00) (113 - 182)  RR: 17 (22 Oct 2023 07:00) (16 - 28)  SpO2: 95% (22 Oct 2023 08:00) (92% - 100%)    Parameters below as of 22 Oct 2023 08:00  Patient On (Oxygen Delivery Method): room air      Neurologic:  -Mental status: Awake, alert, oriented to person, place, and time. Knows he is in the hospital because he has been dizzy. Speech is slow, however without dysarthria or aphasia. Follows commands. Attention/concentration intact.   -Cranial nerves:   II: Visual fields are full to confrontation.  III, IV, VI: right beating nystagmus on rightward gaze in both eyes, right beating nystagmus on upward gaze in both eyes. Pupils equally round and reactive to light  V:  Facial sensation V1-V3 equal and intact   VII: Face is symmetric with normal eye closure and smile  VIII: Hearing is bilaterally intact to finger rub  IX, X: Uvula is midline and soft palate rises symmetrically  XI: Head turning and shoulder shrug are intact.  XII: Tongue protrudes midline  Motor: Normal bulk and tone. No pronator drift. Strength bilateral upper extremity 5/5, bilateral lower extremities 5/5.  Sensation: Intact to light touch bilaterally. No neglect or extinction on double simultaneous testing.  Coordination: No dysmetria on finger-to-nose bilaterally. Slower with LUE however no past pointing.  Gait: deferred    LABS:                        15.8   6.62  )-----------( 260      ( 22 Oct 2023 05:30 )             44.5     10-22    141  |  104  |  11  ----------------------------<  108<H>  3.7   |  25  |  1.03    Ca    9.7      22 Oct 2023 05:30  Phos  3.6     10-22  Mg     2.0     10-22        Urinalysis Basic - ( 22 Oct 2023 05:30 )    Color: x / Appearance: x / SG: x / pH: x  Gluc: 108 mg/dL / Ketone: x  / Bili: x / Urobili: x   Blood: x / Protein: x / Nitrite: x   Leuk Esterase: x / RBC: x / WBC x   Sq Epi: x / Non Sq Epi: x / Bacteria: x        RADIOLOGY & ADDITIONAL TESTS:  reviewed

## 2023-10-22 NOTE — PROGRESS NOTE ADULT - SUBJECTIVE AND OBJECTIVE BOX
OVERNIGHT EVENTS: Weaned off cardene gtt. BPs primarily 150-170s.     SUBJECTIVE / INTERVAL HPI: Patient seen and examined at bedside. Feeling well,       PHYSICAL EXAM:    General: NAD  HEENT: PERRL, anicteric sclera; MMM  Neck: supple  Cardiovascular: +S1/S2, RRR  Respiratory: CTA B/L; normal wob  Gastrointestinal: soft, NT/ND; +BSx4  Extremities: WWP; no edema, clubbing or cyanosis  Vascular: 2+ radial, DP/PT pulses B/L  Neurological: AAOx3; no focal deficits  Psychiatric: pleasant mood and affect  Dermatologic: no appreciable wounds or damage to the skin    VITAL SIGNS:  Vital Signs Last 24 Hrs  T(C): 36.7 (22 Oct 2023 14:39), Max: 36.8 (21 Oct 2023 22:01)  T(F): 98.1 (22 Oct 2023 14:39), Max: 98.3 (21 Oct 2023 22:01)  HR: 68 (22 Oct 2023 15:00) (61 - 112)  BP: 162/117 (22 Oct 2023 15:00) (122/83 - 236/141)  BP(mean): 135 (22 Oct 2023 15:00) (99 - 182)  RR: 19 (22 Oct 2023 15:00) (12 - 37)  SpO2: 95% (22 Oct 2023 15:00) (92% - 98%)    Parameters below as of 22 Oct 2023 15:00  Patient On (Oxygen Delivery Method): room air          MEDICATIONS:  MEDICATIONS  (STANDING):  atorvastatin 80 milliGRAM(s) Oral at bedtime  enoxaparin Injectable 40 milliGRAM(s) SubCutaneous every 24 hours  influenza   Vaccine 0.5 milliLiter(s) IntraMuscular once  labetalol 100 milliGRAM(s) Oral every 12 hours    MEDICATIONS  (PRN):      ALLERGIES:  Allergies    No Known Allergies    Intolerances        LABS:                        15.8   6.62  )-----------( 260      ( 22 Oct 2023 05:30 )             44.5     10-22    141  |  104  |  11  ----------------------------<  108<H>  3.7   |  25  |  1.03    Ca    9.7      22 Oct 2023 05:30  Phos  3.6     10-22  Mg     2.0     10-22        Urinalysis Basic - ( 22 Oct 2023 05:30 )    Color: x / Appearance: x / SG: x / pH: x  Gluc: 108 mg/dL / Ketone: x  / Bili: x / Urobili: x   Blood: x / Protein: x / Nitrite: x   Leuk Esterase: x / RBC: x / WBC x   Sq Epi: x / Non Sq Epi: x / Bacteria: x      CAPILLARY BLOOD GLUCOSE  114 (21 Oct 2023 18:17)      POCT Blood Glucose.: 114 mg/dL (21 Oct 2023 17:04)      RADIOLOGY & ADDITIONAL TESTS: Reviewed. OVERNIGHT EVENTS: Weaned off cardene gtt. BPs primarily 150-170s.     SUBJECTIVE / INTERVAL HPI: Patient seen and examined at bedside. Feeling well.      PHYSICAL EXAM:    General: NAD  HEENT: PERRL, anicteric sclera; MMM  Neck: supple  Cardiovascular: RRR  Respiratory: CTA B/L; normal wob  Gastrointestinal: soft, NT/ND; +BS  Extremities: WWP; no edema, clubbing or cyanosis  Vascular: 2+ radial, DP/PT pulses B/L  Neurological: AAOx3; no focal deficits; weak but symmetric hand , otherwise 5/5 strength, sensation grossly intact  Psychiatric: pleasant mood and affect  Dermatologic: no appreciable wounds or damage to the skin    VITAL SIGNS:  Vital Signs Last 24 Hrs  T(C): 36.7 (22 Oct 2023 14:39), Max: 36.8 (21 Oct 2023 22:01)  T(F): 98.1 (22 Oct 2023 14:39), Max: 98.3 (21 Oct 2023 22:01)  HR: 68 (22 Oct 2023 15:00) (61 - 112)  BP: 162/117 (22 Oct 2023 15:00) (122/83 - 236/141)  BP(mean): 135 (22 Oct 2023 15:00) (99 - 182)  RR: 19 (22 Oct 2023 15:00) (12 - 37)  SpO2: 95% (22 Oct 2023 15:00) (92% - 98%)    Parameters below as of 22 Oct 2023 15:00  Patient On (Oxygen Delivery Method): room air          MEDICATIONS:  MEDICATIONS  (STANDING):  atorvastatin 80 milliGRAM(s) Oral at bedtime  enoxaparin Injectable 40 milliGRAM(s) SubCutaneous every 24 hours  influenza   Vaccine 0.5 milliLiter(s) IntraMuscular once  labetalol 100 milliGRAM(s) Oral every 12 hours    MEDICATIONS  (PRN):      ALLERGIES:  Allergies    No Known Allergies    Intolerances        LABS:                        15.8   6.62  )-----------( 260      ( 22 Oct 2023 05:30 )             44.5     10-22    141  |  104  |  11  ----------------------------<  108<H>  3.7   |  25  |  1.03    Ca    9.7      22 Oct 2023 05:30  Phos  3.6     10-22  Mg     2.0     10-22        Urinalysis Basic - ( 22 Oct 2023 05:30 )    Color: x / Appearance: x / SG: x / pH: x  Gluc: 108 mg/dL / Ketone: x  / Bili: x / Urobili: x   Blood: x / Protein: x / Nitrite: x   Leuk Esterase: x / RBC: x / WBC x   Sq Epi: x / Non Sq Epi: x / Bacteria: x      CAPILLARY BLOOD GLUCOSE  114 (21 Oct 2023 18:17)      POCT Blood Glucose.: 114 mg/dL (21 Oct 2023 17:04)      RADIOLOGY & ADDITIONAL TESTS: Reviewed.

## 2023-10-22 NOTE — PHYSICAL THERAPY INITIAL EVALUATION ADULT - PERTINENT HX OF CURRENT PROBLEM, REHAB EVAL
57y Male with PMHx of HTN (noncompliant with meds) originally presented to [facility] ED c/o room spinning dizziness x 1 week worse with head movement. CTH with no acute intracranial injury. Microvascular disease. Chronic lacunar infarcts. CTA head and neck with mild dot-appearance to the bilateral ACAs and MCAs that may represent vasculitis. Short severe segmental narrowing of the left proximal A2 segment of the anterior cerebral artery. Severe focal narrowing of the left M2/M3 segment of the middle cerebral artery. Normal CTA of the extracranial circulation. No evidence of carotid stenosis. Was given total of 30mg labetalol and 30mg hydralazine with SBP still 230 and so cardene gtt was started. Pt now referred to PT for Evaluation.

## 2023-10-22 NOTE — OCCUPATIONAL THERAPY INITIAL EVALUATION ADULT - GENERAL OBSERVATIONS, REHAB EVAL
OT IE complete. MRS 4. RN Lev clearing pt. for OOB. Pt. received semi-supine in bed, +tele, +heplock in NAD agreeable to therapy session

## 2023-10-22 NOTE — OCCUPATIONAL THERAPY INITIAL EVALUATION ADULT - MODALITIES TREATMENT COMMENTS
Cranial Nerves II - XII: II: PERRLA; visual fields are full to confrontation III, IV, VI: EOMI, no nystagmus appreciated V: facial sensation intact to light touch V1-V3 b/l VII: no ptosis, no facial droop, symmetric eyebrow raise and closure VIII: hearing intact to finger rub b/l  XI: head turning and shoulder shrug intact b/l XII: tongue protrusion midline, Vision H test- noted w. R beating nystagmus with horizontal tracking, Visual Quadrant test- WNL

## 2023-10-22 NOTE — PHYSICAL THERAPY INITIAL EVALUATION ADULT - ADDITIONAL COMMENTS
Pt is primarily Vincentian speaking,  utilized via [employer]/telephone for IE. pt reports residing with his cousin in an apartment with an elevator and 2 LULA. He was I prior in ADLs and functional mob without AD. Pt. owns a cane but does not use it at baseline and his cousin owns a RW he can borrow if needed. Denies any falls in the past year.

## 2023-10-22 NOTE — CONSULT NOTE ADULT - SUBJECTIVE AND OBJECTIVE BOX
HISTORY OF PRESENT ILLNESS:  57M with PMH of HTN (noncompliant) originally presented to [facility] ED c/o room spinning dizziness x 1 week worse with head movement. CT Head with no acute intracranial injury. Microvascular disease. Chronic lacunar infarcts. 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 Admitted and managed for hypertensive emergency (SBPs 230s), on Cardene drip. Patient with change in neuro exam when SBP to 130s, and improved with augmentation. Stroke consulted, NIHSS 0 throughout. Neurosurgery consulted for consideration of formal cerebral angiogram given CTA findings. Patient without complaints at time of this evaluation.    PAST MEDICAL & SURGICAL HISTORY:  HTN (hypertension)    FAMILY HISTORY:  n/a      SOCIAL HISTORY:  Tobacco Use: Denies  EtOH use: Denies  Substance: Denies    Allergies:  No Known Allergies      REVIEW OF SYSTEMS  Non-contributory, see HPI.	      MEDICATIONS:  Antibiotics:    Neuro:    Anticoagulation:  enoxaparin Injectable 40 milliGRAM(s) SubCutaneous every 24 hours    OTHER:  atorvastatin 80 milliGRAM(s) Oral at bedtime  influenza   Vaccine 0.5 milliLiter(s) IntraMuscular once  labetalol 100 milliGRAM(s) Oral every 12 hours    IVF:      Vital Signs Last 24 Hrs  T(C): 36.7 (22 Oct 2023 14:39), Max: 36.8 (21 Oct 2023 22:01)  T(F): 98.1 (22 Oct 2023 14:39), Max: 98.3 (21 Oct 2023 22:01)  HR: 66 (22 Oct 2023 17:00) (61 - 109)  BP: 197/116 (22 Oct 2023 17:00) (122/83 - 236/141)  BP(mean): 145 (22 Oct 2023 17:00) (99 - 182)  RR: 18 (22 Oct 2023 17:00) (12 - 37)  SpO2: 94% (22 Oct 2023 17:00) (92% - 98%)    Parameters below as of 22 Oct 2023 17:00  Patient On (Oxygen Delivery Method): room air        PHYSICAL EXAM:  Gen: NAD, AAOx3  HEENT: PERRL. EOMI. VF intact. NC/AT.  Neck: FROM, nontender  Neuro: CNs II-XII intact. 5/5 str x4 extremities. Sensation to LT intact. Speech clear. Following commands. Negative pronator drift.    LABS:                        15.8   6.62  )-----------( 260      ( 22 Oct 2023 05:30 )             44.5     10-22    141  |  104  |  11  ----------------------------<  108<H>  3.7   |  25  |  1.03    Ca    9.7      22 Oct 2023 05:30  Phos  3.6     10-22  Mg     2.0     10-22        Urinalysis Basic - ( 22 Oct 2023 05:30 )    Color: x / Appearance: x / SG: x / pH: x  Gluc: 108 mg/dL / Ketone: x  / Bili: x / Urobili: x   Blood: x / Protein: x / Nitrite: x   Leuk Esterase: x / RBC: x / WBC x   Sq Epi: x / Non Sq Epi: x / Bacteria: x      RADIOLOGY & ADDITIONAL STUDIES:   Impression:    Mild dot-appearance to the bilateral ACAs and MCAs that may represent   vasculitis    Short severe segmental narrowing of the left proximal A2 segment of the   anterior cerebral artery, unchanged.    Severe focal narrowing of the left M2/M3 segment of the middle cerebral   artery, unchanged.    Normal CTA of the extracranial circulation.

## 2023-10-22 NOTE — OCCUPATIONAL THERAPY INITIAL EVALUATION ADULT - DIAGNOSIS, OT EVAL
Pt. admitted to [facility] for stroke workup, MRI + for punctate foci of acute ischemia within the right periatrial white matter and left centrum semiovale ovale. Upon assessment, pt. demo increased dizziness as well as slight deficits in balance and postural control impacting engagement in ADLs and functional mob/transfers

## 2023-10-22 NOTE — PHYSICAL THERAPY INITIAL EVALUATION ADULT - GENERAL OBSERVATIONS, REHAB EVAL
PT IE completed. MRS 4. RN Lev clearing pt for PT IE. Pt. received semi-supine in bed, +tele, +heplock in NAD agreeable to therapy session. +OT Trey present for OT IE.

## 2023-10-22 NOTE — CONSULT NOTE ADULT - ASSESSMENT
57M with PMH of HTN (noncompliant) originally presented to [facility] ED c/o room spinning dizziness x 1 week worse with head movement.

## 2023-10-22 NOTE — PROGRESS NOTE ADULT - ASSESSMENT
57y Male with PMHx of HTN (noncompliant with meds) originally presented to [facility] ED c/o room spinning dizziness x 1 week worse with head movement. CTH with no acute intracranial injury. Microvascular disease. Chronic lacunar infarcts. CTA head and neck with mild dot-appearance to the bilateral ACAs and MCAs that may represent vasculitis. Short severe segmental narrowing of the left proximal A2 segment of the anterior cerebral artery. Severe focal narrowing of the left M2/M3 segment of the middle cerebral artery. Normal CTA of the extracranial circulation. No evidence of carotid stenosis. Was given total of 30mg labetalol and 30mg hydralazine with SBP still 230s and so cardene gtt was started. Given imaging findings, stroke was consulted. Upon initial examination, NIHSS 0. Was awaiting MICU bed for admission under the stroke service for a stroke workup. Patient then became confused/aphasic in setting of SBP 130s. Stroke code was called. CT imaging unchanged. Exam slowly improving with SBP in the low 200s. Discussed case with Dr. Myles, since exam is improving and no change in imaging, likely symptoms are due to significant drop in BP. Admitted to MICU for stroke w/u.    Neuro  #CVA workup  - d/c aspirin and plavix in anticipation of LP  - continue atorvastatin 80mg daily  - q1hr stroke neuro checks and vitals  - MRI with punctate foci of acute ischemia within the right periatrial white matter and left centrum semiovale ovale. The SWI images demonstrates multiple scattered punctate foci of   micro hemorrhages. Flair reviewed by Dr. Myles, possible vasculitic lesions.   - MRA - f/u read  - Stroke Code HCT Results: No acute intracranial injury. Microvascular disease. Chronic lacunar infarcts.  - Stroke Code CTA Results: Mild dot-appearance to the bilateral ACAs and MCAs that may represent vasculitis. Short severe segmental narrowing of the left proximal A2 segment of the anterior cerebral artery, unchanged. Severe focal narrowing of the left M2/M3 segment of the middle cerebral artery, unchanged.  - Stroke education  - vasculitis panel ordered  - neurosurgery consulted for DSA timing  - LP timing to be determined    Cards  #HTN  - SBP goal 160-180, can give PRN pushes if above 180  - started labetalol 100mg BID  - amlodipine d/c  - does not take home BP meds, does not know what they are  - obtain TTE with bubble    #HLD  - high dose statin as above in CVA  - LDL results: 156    Pulm  - call provider if SPO2 < 94%    GI  #Nutrition/Fluids/Electrolytes   - replete K<4 and Mg <2  - Diet: reg diet  - IVF: n/a    Renal  - trend BMP    Infectious Disease  - amy    Endocrine  - A1C results: 5.7  - TSH results: 5.090  - free T4 and T3 pending    DVT Prophylaxis  - lovenox sq for DVT prophylaxis   - SCDs for DVT prophylaxis     Dispo: MICU

## 2023-10-22 NOTE — PROGRESS NOTE ADULT - ASSESSMENT
57y Male with PMHx of HTN (noncompliant with meds) originally presented to [facility] ED c/o room spinning dizziness x 1 week worse with head movement. CTH with no acute intracranial injury. Microvascular disease. Chronic lacunar infarcts. CTA head and neck with mild dot-appearance to the bilateral ACAs and MCAs that may represent vasculitis. Short severe segmental narrowing of the left proximal A2 segment of the anterior cerebral artery. Severe focal narrowing of the left M2/M3 segment of the middle cerebral artery. Normal CTA of the extracranial circulation. No evidence of carotid stenosis. Was given total of 30mg labetalol and 30mg hydralazine with SBP still 230s and so cardene gtt was started. Given imaging findings, stroke was consulted. Upon initial examination, NIHSS 0. Was awaiting MICU bed for admission under the stroke service for a stroke workup. Patient then became confused/aphasic in setting of SBP 130s. Stroke code was called. CT imaging unchanged. Exam slowly improving with SBP in the low 200s. Discussed case with Dr. Myles, since exam is improving and no change in imaging, likely symptoms are due to significant drop in BP. Admitted to MICU for stroke w/u with SBP goal 180-220 on cardene gtt.    Neuro  #CVA workup  - continue aspirin 81mg and plavix 75mg daily  - continue atorvastatin 80mg daily  - q1hr stroke neuro checks and vitals  - obtain MRA brain  - Stroke Code HCT Results: No acute intracranial injury. Microvascular disease. Chronic lacunar infarcts.  - Stroke Code CTA Results: Mild dot-appearance to the bilateral ACAs and MCAs that may represent vasculitis. Short severe segmental narrowing of the left proximal A2 segment of the anterior cerebral artery, unchanged. Severe focal narrowing of the left M2/M3 segment of the middle cerebral artery, unchanged.  - Stroke education  - vasculitis panel ordered  - consider DSA    Cards  #HTN  - SBP goal 180-220  - amlodipine 2.5mg started to bridge with cardene  - does not take home BP meds, does not know what they are  - obtain TTE with bubble    #HLD  - high dose statin as above in CVA  - LDL results: 156    Pulm  - call provider if SPO2 < 94%    GI  #Nutrition/Fluids/Electrolytes   - replete K<4 and Mg <2  - Diet: reg diet  - IVF: n/a    Renal  - trend BMP    Infectious Disease  - amy    Endocrine  - A1C results: 5.7  - TSH results: 5.090  - free T4 and T3 pending    DVT Prophylaxis  - lovenox sq for DVT prophylaxis   - SCDs for DVT prophylaxis     Dispo: MICU     Discussed daily hospital plans and goals with patient.     Discussed with Neurology Attending Dr. Myles   57y Male with PMHx of HTN (noncompliant with meds) originally presented to [facility] ED c/o room spinning dizziness x 1 week worse with head movement. CTH with no acute intracranial injury. Microvascular disease. Chronic lacunar infarcts. CTA head and neck with mild dot-appearance to the bilateral ACAs and MCAs that may represent vasculitis. Short severe segmental narrowing of the left proximal A2 segment of the anterior cerebral artery. Severe focal narrowing of the left M2/M3 segment of the middle cerebral artery. Normal CTA of the extracranial circulation. No evidence of carotid stenosis. Was given total of 30mg labetalol and 30mg hydralazine with SBP still 230s and so cardene gtt was started. Given imaging findings, stroke was consulted. Upon initial examination, NIHSS 0. Was awaiting MICU bed for admission under the stroke service for a stroke workup. Patient then became confused/aphasic in setting of SBP 130s. Stroke code was called. CT imaging unchanged. Exam slowly improving with SBP in the low 200s. Discussed case with Dr. Myles, since exam is improving and no change in imaging, likely symptoms are due to significant drop in BP. Admitted to MICU for stroke w/u with SBP goal 180-220 on cardene gtt.    Neuro  #CVA workup  - continue aspirin 81mg, plavix d/c for LP today  - continue atorvastatin 80mg daily  - q1hr stroke neuro checks and vitals  - MRI/MRA - f/u read, possible vasculitic lesions on flair  - Stroke Code HCT Results: No acute intracranial injury. Microvascular disease. Chronic lacunar infarcts.  - Stroke Code CTA Results: Mild dot-appearance to the bilateral ACAs and MCAs that may represent vasculitis. Short severe segmental narrowing of the left proximal A2 segment of the anterior cerebral artery, unchanged. Severe focal narrowing of the left M2/M3 segment of the middle cerebral artery, unchanged.  - Stroke education  - vasculitis panel ordered  - neurosurgery consulted for DSA timing  - f/u LP results    Cards  #HTN  - SBP goal 180-220  - amlodipine 2.5mg started to bridge with cardene  - does not take home BP meds, does not know what they are  - obtain TTE with bubble    #HLD  - high dose statin as above in CVA  - LDL results: 156    Pulm  - call provider if SPO2 < 94%    GI  #Nutrition/Fluids/Electrolytes   - replete K<4 and Mg <2  - Diet: reg diet  - IVF: n/a    Renal  - trend BMP    Infectious Disease  - amy    Endocrine  - A1C results: 5.7  - TSH results: 5.090  - free T4 and T3 pending    DVT Prophylaxis  - lovenox sq for DVT prophylaxis   - SCDs for DVT prophylaxis     Dispo: MICU     Discussed daily hospital plans and goals with patient.     Discussed with Neurology Attending Dr. Myles   57y Male with PMHx of HTN (noncompliant with meds) originally presented to [facility] ED c/o room spinning dizziness x 1 week worse with head movement. CTH with no acute intracranial injury. Microvascular disease. Chronic lacunar infarcts. CTA head and neck with mild dot-appearance to the bilateral ACAs and MCAs that may represent vasculitis. Short severe segmental narrowing of the left proximal A2 segment of the anterior cerebral artery. Severe focal narrowing of the left M2/M3 segment of the middle cerebral artery. Normal CTA of the extracranial circulation. No evidence of carotid stenosis. Was given total of 30mg labetalol and 30mg hydralazine with SBP still 230s and so cardene gtt was started. Given imaging findings, stroke was consulted. Upon initial examination, NIHSS 0. Was awaiting MICU bed for admission under the stroke service for a stroke workup. Patient then became confused/aphasic in setting of SBP 130s. Stroke code was called. CT imaging unchanged. Exam slowly improving with SBP in the low 200s. Discussed case with Dr. Myles, since exam is improving and no change in imaging, likely symptoms are due to significant drop in BP. Admitted to MICU for stroke w/u with SBP goal 180-220 on cardene gtt.    Neuro  #CVA workup  - continue aspirin 81mg, plavix d/c for LP today  - continue atorvastatin 80mg daily  - q1hr stroke neuro checks and vitals  - MRI/MRA - f/u read, possible vasculitic lesions on flair  - Stroke Code HCT Results: No acute intracranial injury. Microvascular disease. Chronic lacunar infarcts.  - Stroke Code CTA Results: Mild dot-appearance to the bilateral ACAs and MCAs that may represent vasculitis. Short severe segmental narrowing of the left proximal A2 segment of the anterior cerebral artery, unchanged. Severe focal narrowing of the left M2/M3 segment of the middle cerebral artery, unchanged.  - Stroke education  - vasculitis panel ordered  - neurosurgery consulted for DSA timing  - f/u LP results    Cards  #HTN  - SBP goal <180, can give PRN pushes if above 180  - amlodipine 2.5mg started to bridge with cardene  - does not take home BP meds, does not know what they are  - obtain TTE with bubble    #HLD  - high dose statin as above in CVA  - LDL results: 156    Pulm  - call provider if SPO2 < 94%    GI  #Nutrition/Fluids/Electrolytes   - replete K<4 and Mg <2  - Diet: reg diet  - IVF: n/a    Renal  - trend BMP    Infectious Disease  - amy    Endocrine  - A1C results: 5.7  - TSH results: 5.090  - free T4 and T3 pending    DVT Prophylaxis  - lovenox sq for DVT prophylaxis   - SCDs for DVT prophylaxis     Dispo: MICU     Discussed daily hospital plans and goals with patient.     Discussed with Neurology Attending Dr. Myles   57y Male with PMHx of HTN (noncompliant with meds) originally presented to [facility] ED c/o room spinning dizziness x 1 week worse with head movement. CTH with no acute intracranial injury. Microvascular disease. Chronic lacunar infarcts. CTA head and neck with mild dot-appearance to the bilateral ACAs and MCAs that may represent vasculitis. Short severe segmental narrowing of the left proximal A2 segment of the anterior cerebral artery. Severe focal narrowing of the left M2/M3 segment of the middle cerebral artery. Normal CTA of the extracranial circulation. No evidence of carotid stenosis. Was given total of 30mg labetalol and 30mg hydralazine with SBP still 230s and so cardene gtt was started. Given imaging findings, stroke was consulted. Upon initial examination, NIHSS 0. Was awaiting MICU bed for admission under the stroke service for a stroke workup. Patient then became confused/aphasic in setting of SBP 130s. Stroke code was called. CT imaging unchanged. Exam slowly improving with SBP in the low 200s. Discussed case with Dr. Myles, since exam is improving and no change in imaging, likely symptoms are due to significant drop in BP. Admitted to MICU for stroke w/u with SBP goal 180-220 on cardene gtt.    Neuro  #CVA workup  - continue aspirin 81mg, plavix d/c for LP today  - continue atorvastatin 80mg daily  - q1hr stroke neuro checks and vitals  - MRI/MRA - f/u read, possible vasculitic lesions on flair  - Stroke Code HCT Results: No acute intracranial injury. Microvascular disease. Chronic lacunar infarcts.  - Stroke Code CTA Results: Mild dot-appearance to the bilateral ACAs and MCAs that may represent vasculitis. Short severe segmental narrowing of the left proximal A2 segment of the anterior cerebral artery, unchanged. Severe focal narrowing of the left M2/M3 segment of the middle cerebral artery, unchanged.  - Stroke education  - vasculitis panel ordered  - neurosurgery consulted for DSA timing  - f/u LP results    Cards  #HTN  - SBP goal <180, can give PRN pushes if above 180  - cardene and fluids discontinued  - amlodipine 2.5mg started to bridge with cardene  - does not take home BP meds, does not know what they are  - obtain TTE with bubble    #HLD  - high dose statin as above in CVA  - LDL results: 156    Pulm  - call provider if SPO2 < 94%    GI  #Nutrition/Fluids/Electrolytes   - replete K<4 and Mg <2  - Diet: reg diet  - IVF: n/a    Renal  - trend BMP    Infectious Disease  - amy    Endocrine  - A1C results: 5.7  - TSH results: 5.090  - free T4 and T3 pending    DVT Prophylaxis  - lovenox sq for DVT prophylaxis   - SCDs for DVT prophylaxis     Dispo: MICU     Discussed daily hospital plans and goals with patient.     Discussed with Neurology Attending Dr. Myles   57y Male with PMHx of HTN (noncompliant with meds) originally presented to [facility] ED c/o room spinning dizziness x 1 week worse with head movement. CTH with no acute intracranial injury. Microvascular disease. Chronic lacunar infarcts. CTA head and neck with mild dot-appearance to the bilateral ACAs and MCAs that may represent vasculitis. Short severe segmental narrowing of the left proximal A2 segment of the anterior cerebral artery. Severe focal narrowing of the left M2/M3 segment of the middle cerebral artery. Normal CTA of the extracranial circulation. No evidence of carotid stenosis. Was given total of 30mg labetalol and 30mg hydralazine with SBP still 230s and so cardene gtt was started. Given imaging findings, stroke was consulted. Upon initial examination, NIHSS 0. Was awaiting MICU bed for admission under the stroke service for a stroke workup. Patient then became confused/aphasic in setting of SBP 130s. Stroke code was called. CT imaging unchanged. Exam slowly improving with SBP in the low 200s. Discussed case with Dr. Myles, since exam is improving and no change in imaging, likely symptoms are due to significant drop in BP. Admitted to MICU for stroke w/u.    Neuro  #CVA workup  - continue aspirin 81mg, plavix d/c for LP today  - continue atorvastatin 80mg daily  - q1hr stroke neuro checks and vitals  - MRI/MRA - f/u read, possible vasculitic lesions on flair  - Stroke Code HCT Results: No acute intracranial injury. Microvascular disease. Chronic lacunar infarcts.  - Stroke Code CTA Results: Mild dot-appearance to the bilateral ACAs and MCAs that may represent vasculitis. Short severe segmental narrowing of the left proximal A2 segment of the anterior cerebral artery, unchanged. Severe focal narrowing of the left M2/M3 segment of the middle cerebral artery, unchanged.  - Stroke education  - vasculitis panel ordered  - neurosurgery consulted for DSA timing  - f/u LP results    Cards  #HTN  - SBP goal <180, can give PRN pushes if above 180  - cardene and fluids discontinued  - amlodipine 2.5mg started to bridge with cardene  - does not take home BP meds, does not know what they are  - obtain TTE with bubble    #HLD  - high dose statin as above in CVA  - LDL results: 156    Pulm  - call provider if SPO2 < 94%    GI  #Nutrition/Fluids/Electrolytes   - replete K<4 and Mg <2  - Diet: reg diet  - IVF: n/a    Renal  - trend BMP    Infectious Disease  - amy    Endocrine  - A1C results: 5.7  - TSH results: 5.090  - free T4 and T3 pending    DVT Prophylaxis  - lovenox sq for DVT prophylaxis   - SCDs for DVT prophylaxis     Dispo: MICU     Discussed daily hospital plans and goals with patient.     Discussed with Neurology Attending Dr. Myles   57y Male with PMHx of HTN (noncompliant with meds) originally presented to [facility] ED c/o room spinning dizziness x 1 week worse with head movement. CTH with no acute intracranial injury. Microvascular disease. Chronic lacunar infarcts. CTA head and neck with mild dot-appearance to the bilateral ACAs and MCAs that may represent vasculitis. Short severe segmental narrowing of the left proximal A2 segment of the anterior cerebral artery. Severe focal narrowing of the left M2/M3 segment of the middle cerebral artery. Normal CTA of the extracranial circulation. No evidence of carotid stenosis. Was given total of 30mg labetalol and 30mg hydralazine with SBP still 230s and so cardene gtt was started. Given imaging findings, stroke was consulted. Upon initial examination, NIHSS 0. Was awaiting MICU bed for admission under the stroke service for a stroke workup. Patient then became confused/aphasic in setting of SBP 130s. Stroke code was called. CT imaging unchanged. Exam slowly improving with SBP in the low 200s. Discussed case with Dr. Myles, since exam is improving and no change in imaging, likely symptoms are due to significant drop in BP. Admitted to MICU for stroke w/u.    Neuro  #CVA workup  - continue aspirin 81mg, plavix d/c for LP today  - continue atorvastatin 80mg daily  - q1hr stroke neuro checks and vitals  - MRI with punctate foci of acute ischemia within the right periatrial white matter and left centrum semiovale ovale. The SWI images demonstrates multiple scattered punctate foci of   microhemorrhages. Flair reviewed by Dr. Myles, possible vasculitic lesions.   - MRA - f/u read  - Stroke Code HCT Results: No acute intracranial injury. Microvascular disease. Chronic lacunar infarcts.  - Stroke Code CTA Results: Mild dot-appearance to the bilateral ACAs and MCAs that may represent vasculitis. Short severe segmental narrowing of the left proximal A2 segment of the anterior cerebral artery, unchanged. Severe focal narrowing of the left M2/M3 segment of the middle cerebral artery, unchanged.  - Stroke education  - vasculitis panel ordered  - neurosurgery consulted for DSA timing  - f/u LP results    Cards  #HTN  - SBP goal <180, can give PRN pushes if above 180  - cardene and fluids discontinued  - amlodipine 2.5mg started to bridge with cardene  - does not take home BP meds, does not know what they are  - obtain TTE with bubble    #HLD  - high dose statin as above in CVA  - LDL results: 156    Pulm  - call provider if SPO2 < 94%    GI  #Nutrition/Fluids/Electrolytes   - replete K<4 and Mg <2  - Diet: reg diet  - IVF: n/a    Renal  - trend BMP    Infectious Disease  - amy    Endocrine  - A1C results: 5.7  - TSH results: 5.090  - free T4 and T3 pending    DVT Prophylaxis  - lovenox sq for DVT prophylaxis   - SCDs for DVT prophylaxis     Dispo: MICU     Discussed daily hospital plans and goals with patient.     Discussed with Neurology Attending Dr. Myles   57y Male with PMHx of HTN (noncompliant with meds) originally presented to [facility] ED c/o room spinning dizziness x 1 week worse with head movement. CTH with no acute intracranial injury. Microvascular disease. Chronic lacunar infarcts. CTA head and neck with mild dot-appearance to the bilateral ACAs and MCAs that may represent vasculitis. Short severe segmental narrowing of the left proximal A2 segment of the anterior cerebral artery. Severe focal narrowing of the left M2/M3 segment of the middle cerebral artery. Normal CTA of the extracranial circulation. No evidence of carotid stenosis. Was given total of 30mg labetalol and 30mg hydralazine with SBP still 230s and so cardene gtt was started. Given imaging findings, stroke was consulted. Upon initial examination, NIHSS 0. Was awaiting MICU bed for admission under the stroke service for a stroke workup. Patient then became confused/aphasic in setting of SBP 130s. Stroke code was called. CT imaging unchanged. Exam slowly improving with SBP in the low 200s. Discussed case with Dr. Myles, since exam is improving and no change in imaging, likely symptoms are due to significant drop in BP. Admitted to MICU for stroke w/u.    Neuro  #CVA workup  - d/c aspirin and plavix   - continue atorvastatin 80mg daily  - q1hr stroke neuro checks and vitals  - MRI with punctate foci of acute ischemia within the right periatrial white matter and left centrum semiovale ovale. The SWI images demonstrates multiple scattered punctate foci of   micro hemorrhages. Flair reviewed by Dr. Myles, possible vasculitic lesions.   - MRA - f/u read  - Stroke Code HCT Results: No acute intracranial injury. Microvascular disease. Chronic lacunar infarcts.  - Stroke Code CTA Results: Mild dot-appearance to the bilateral ACAs and MCAs that may represent vasculitis. Short severe segmental narrowing of the left proximal A2 segment of the anterior cerebral artery, unchanged. Severe focal narrowing of the left M2/M3 segment of the middle cerebral artery, unchanged.  - Stroke education  - vasculitis panel ordered  - neurosurgery consulted for DSA timing    Cards  #HTN  - SBP goal 140-180, can give PRN pushes if above 180  - started labetalol 100mg BID  - amlodipine d/c  - does not take home BP meds, does not know what they are  - obtain TTE with bubble    #HLD  - high dose statin as above in CVA  - LDL results: 156    Pulm  - call provider if SPO2 < 94%    GI  #Nutrition/Fluids/Electrolytes   - replete K<4 and Mg <2  - Diet: reg diet  - IVF: n/a    Renal  - trend BMP    Infectious Disease  - amy    Endocrine  - A1C results: 5.7  - TSH results: 5.090  - free T4 and T3 pending    DVT Prophylaxis  - lovenox sq for DVT prophylaxis   - SCDs for DVT prophylaxis     Dispo: MICU     Discussed daily hospital plans and goals with patient.     Discussed with Neurology Attending Dr. Myles   57y Male with PMHx of HTN (noncompliant with meds) originally presented to [facility] ED c/o room spinning dizziness x 1 week worse with head movement. CTH with no acute intracranial injury. Microvascular disease. Chronic lacunar infarcts. CTA head and neck with mild dot-appearance to the bilateral ACAs and MCAs that may represent vasculitis. Short severe segmental narrowing of the left proximal A2 segment of the anterior cerebral artery. Severe focal narrowing of the left M2/M3 segment of the middle cerebral artery. Normal CTA of the extracranial circulation. No evidence of carotid stenosis. Was given total of 30mg labetalol and 30mg hydralazine with SBP still 230s and so cardene gtt was started. Given imaging findings, stroke was consulted. Upon initial examination, NIHSS 0. Was awaiting MICU bed for admission under the stroke service for a stroke workup. Patient then became confused/aphasic in setting of SBP 130s. Stroke code was called. CT imaging unchanged. Exam slowly improving with SBP in the low 200s. Discussed case with Dr. Myles, since exam is improving and no change in imaging, likely symptoms are due to significant drop in BP. Admitted to MICU for stroke w/u.    Neuro  #CVA workup  - d/c aspirin and plavix in anticipation of LP  - continue atorvastatin 80mg daily  - q1hr stroke neuro checks and vitals  - MRI with punctate foci of acute ischemia within the right periatrial white matter and left centrum semiovale ovale. The SWI images demonstrates multiple scattered punctate foci of   micro hemorrhages. Flair reviewed by Dr. Myles, possible vasculitic lesions.   - MRA - f/u read  - Stroke Code HCT Results: No acute intracranial injury. Microvascular disease. Chronic lacunar infarcts.  - Stroke Code CTA Results: Mild dot-appearance to the bilateral ACAs and MCAs that may represent vasculitis. Short severe segmental narrowing of the left proximal A2 segment of the anterior cerebral artery, unchanged. Severe focal narrowing of the left M2/M3 segment of the middle cerebral artery, unchanged.  - Stroke education  - vasculitis panel ordered  - neurosurgery consulted for DSA timing  - LP timing to be determined    Cards  #HTN  - SBP goal 140-180, can give PRN pushes if above 180  - started labetalol 100mg BID  - amlodipine d/c  - does not take home BP meds, does not know what they are  - obtain TTE with bubble    #HLD  - high dose statin as above in CVA  - LDL results: 156    Pulm  - call provider if SPO2 < 94%    GI  #Nutrition/Fluids/Electrolytes   - replete K<4 and Mg <2  - Diet: reg diet  - IVF: n/a    Renal  - trend BMP    Infectious Disease  - amy    Endocrine  - A1C results: 5.7  - TSH results: 5.090  - free T4 and T3 pending    DVT Prophylaxis  - lovenox sq for DVT prophylaxis   - SCDs for DVT prophylaxis     Dispo: MICU     Discussed daily hospital plans and goals with patient.     Discussed with Neurology Attending Dr. Myles   57y Male with PMHx of HTN (noncompliant with meds) originally presented to [facility] ED c/o room spinning dizziness x 1 week worse with head movement. CTH with no acute intracranial injury. Microvascular disease. Chronic lacunar infarcts. CTA head and neck with mild dot-appearance to the bilateral ACAs and MCAs that may represent vasculitis. Short severe segmental narrowing of the left proximal A2 segment of the anterior cerebral artery. Severe focal narrowing of the left M2/M3 segment of the middle cerebral artery. Normal CTA of the extracranial circulation. No evidence of carotid stenosis. Was given total of 30mg labetalol and 30mg hydralazine with SBP still 230s and so cardene gtt was started. Given imaging findings, stroke was consulted. Upon initial examination, NIHSS 0. Was awaiting MICU bed for admission under the stroke service for a stroke workup. Patient then became confused/aphasic in setting of SBP 130s. Stroke code was called. CT imaging unchanged. Exam slowly improving with SBP in the low 200s. Discussed case with Dr. Myles, since exam is improving and no change in imaging, likely symptoms are due to significant drop in BP. Admitted to MICU for stroke w/u.    Neuro  #CVA workup  - d/c aspirin and plavix in anticipation of LP  - continue atorvastatin 80mg daily  - q1hr stroke neuro checks and vitals  - MRI with punctate foci of acute ischemia within the right periatrial white matter and left centrum semiovale ovale. The SWI images demonstrates multiple scattered punctate foci of   micro hemorrhages. Flair reviewed by Dr. Myles, possible vasculitic lesions.   - MRA - f/u read  - Stroke Code HCT Results: No acute intracranial injury. Microvascular disease. Chronic lacunar infarcts.  - Stroke Code CTA Results: Mild dot-appearance to the bilateral ACAs and MCAs that may represent vasculitis. Short severe segmental narrowing of the left proximal A2 segment of the anterior cerebral artery, unchanged. Severe focal narrowing of the left M2/M3 segment of the middle cerebral artery, unchanged.  - Stroke education  - vasculitis panel ordered  - neurosurgery consulted for DSA timing  - LP timing to be determined    Cards  #HTN  - SBP goal 160-180, can give PRN pushes if above 180  - started labetalol 100mg BID  - amlodipine d/c  - does not take home BP meds, does not know what they are  - obtain TTE with bubble    #HLD  - high dose statin as above in CVA  - LDL results: 156    Pulm  - call provider if SPO2 < 94%    GI  #Nutrition/Fluids/Electrolytes   - replete K<4 and Mg <2  - Diet: reg diet  - IVF: n/a    Renal  - trend BMP    Infectious Disease  - amy    Endocrine  - A1C results: 5.7  - TSH results: 5.090  - free T4 and T3 pending    DVT Prophylaxis  - lovenox sq for DVT prophylaxis   - SCDs for DVT prophylaxis     Dispo: MICU     Discussed daily hospital plans and goals with patient.     Discussed with Neurology Attending Dr. Myles

## 2023-10-22 NOTE — PHYSICAL THERAPY INITIAL EVALUATION ADULT - GAIT DISTANCE, PT EVAL
further amb limited by increasing dizziness prompting return to room for safety, dizziness improved in sitting and supine/50 feet

## 2023-10-22 NOTE — OCCUPATIONAL THERAPY INITIAL EVALUATION ADULT - ADDITIONAL COMMENTS
Pt. reports residing alone in an apartment with an elevator and 2 LULA. He was I prior in ADLs and functional mob without AD. BR with tub/shower combo. Pt. is R handed and reports no glasses usage. he owns a cane and RW from previous surgeries

## 2023-10-23 LAB
ACE SERPL-CCNC: 32 U/L — SIGNIFICANT CHANGE UP (ref 14–82)
ACE SERPL-CCNC: 32 U/L — SIGNIFICANT CHANGE UP (ref 14–82)
ALPHA-GAL IGE QN: 0 — SIGNIFICANT CHANGE UP
ALPHA-GAL IGE QN: 0 — SIGNIFICANT CHANGE UP
ALPHA-GAL IGE QN: <0.1 KUA/L — SIGNIFICANT CHANGE UP
ALPHA-GAL IGE QN: <0.1 KUA/L — SIGNIFICANT CHANGE UP
ANION GAP SERPL CALC-SCNC: 9 MMOL/L — SIGNIFICANT CHANGE UP (ref 5–17)
ANION GAP SERPL CALC-SCNC: 9 MMOL/L — SIGNIFICANT CHANGE UP (ref 5–17)
B BURGDOR C6 AB SER-ACNC: NEGATIVE — SIGNIFICANT CHANGE UP
B BURGDOR C6 AB SER-ACNC: NEGATIVE — SIGNIFICANT CHANGE UP
B BURGDOR IGG+IGM SER-ACNC: 0.29 INDEX — SIGNIFICANT CHANGE UP (ref 0.01–0.89)
B BURGDOR IGG+IGM SER-ACNC: 0.29 INDEX — SIGNIFICANT CHANGE UP (ref 0.01–0.89)
BUN SERPL-MCNC: 17 MG/DL — SIGNIFICANT CHANGE UP (ref 7–23)
BUN SERPL-MCNC: 17 MG/DL — SIGNIFICANT CHANGE UP (ref 7–23)
C3 SERPL-MCNC: 145 MG/DL — SIGNIFICANT CHANGE UP (ref 81–157)
C3 SERPL-MCNC: 145 MG/DL — SIGNIFICANT CHANGE UP (ref 81–157)
C4 SERPL-MCNC: 33 MG/DL — SIGNIFICANT CHANGE UP (ref 13–39)
C4 SERPL-MCNC: 33 MG/DL — SIGNIFICANT CHANGE UP (ref 13–39)
CALCIUM SERPL-MCNC: 9.3 MG/DL — SIGNIFICANT CHANGE UP (ref 8.4–10.5)
CALCIUM SERPL-MCNC: 9.3 MG/DL — SIGNIFICANT CHANGE UP (ref 8.4–10.5)
CHLORIDE SERPL-SCNC: 105 MMOL/L — SIGNIFICANT CHANGE UP (ref 96–108)
CHLORIDE SERPL-SCNC: 105 MMOL/L — SIGNIFICANT CHANGE UP (ref 96–108)
CMV DNA CSF QL NAA+PROBE: SIGNIFICANT CHANGE UP IU/ML
CMV DNA CSF QL NAA+PROBE: SIGNIFICANT CHANGE UP IU/ML
CMV DNA SPEC NAA+PROBE-LOG#: SIGNIFICANT CHANGE UP LOG10IU/ML
CMV DNA SPEC NAA+PROBE-LOG#: SIGNIFICANT CHANGE UP LOG10IU/ML
CO2 SERPL-SCNC: 25 MMOL/L — SIGNIFICANT CHANGE UP (ref 22–31)
CO2 SERPL-SCNC: 25 MMOL/L — SIGNIFICANT CHANGE UP (ref 22–31)
CREAT SERPL-MCNC: 1.12 MG/DL — SIGNIFICANT CHANGE UP (ref 0.5–1.3)
CREAT SERPL-MCNC: 1.12 MG/DL — SIGNIFICANT CHANGE UP (ref 0.5–1.3)
EBV EA AB SER IA-ACNC: <5 U/ML — SIGNIFICANT CHANGE UP
EBV EA AB SER IA-ACNC: <5 U/ML — SIGNIFICANT CHANGE UP
EBV EA AB TITR SER IF: POSITIVE
EBV EA AB TITR SER IF: POSITIVE
EBV EA IGG SER-ACNC: NEGATIVE — SIGNIFICANT CHANGE UP
EBV EA IGG SER-ACNC: NEGATIVE — SIGNIFICANT CHANGE UP
EBV NA IGG SER IA-ACNC: >600 U/ML — HIGH
EBV NA IGG SER IA-ACNC: >600 U/ML — HIGH
EBV PATRN SPEC IB-IMP: SIGNIFICANT CHANGE UP
EBV PATRN SPEC IB-IMP: SIGNIFICANT CHANGE UP
EBV VCA IGG AVIDITY SER QL IA: POSITIVE
EBV VCA IGG AVIDITY SER QL IA: POSITIVE
EBV VCA IGM SER IA-ACNC: 119 U/ML — HIGH
EBV VCA IGM SER IA-ACNC: 119 U/ML — HIGH
EBV VCA IGM SER IA-ACNC: <10 U/ML — SIGNIFICANT CHANGE UP
EBV VCA IGM SER IA-ACNC: <10 U/ML — SIGNIFICANT CHANGE UP
EBV VCA IGM TITR FLD: NEGATIVE — SIGNIFICANT CHANGE UP
EBV VCA IGM TITR FLD: NEGATIVE — SIGNIFICANT CHANGE UP
EGFR: 76 ML/MIN/1.73M2 — SIGNIFICANT CHANGE UP
EGFR: 76 ML/MIN/1.73M2 — SIGNIFICANT CHANGE UP
GALACTOSE-ALPHA-1,3-GALACTOSE IGE RESULT: 0 — SIGNIFICANT CHANGE UP
GALACTOSE-ALPHA-1,3-GALACTOSE IGE RESULT: 0 — SIGNIFICANT CHANGE UP
GLUCOSE SERPL-MCNC: 100 MG/DL — HIGH (ref 70–99)
GLUCOSE SERPL-MCNC: 100 MG/DL — HIGH (ref 70–99)
HCT VFR BLD CALC: 43.3 % — SIGNIFICANT CHANGE UP (ref 39–50)
HCT VFR BLD CALC: 43.3 % — SIGNIFICANT CHANGE UP (ref 39–50)
HGB BLD-MCNC: 15.1 G/DL — SIGNIFICANT CHANGE UP (ref 13–17)
HGB BLD-MCNC: 15.1 G/DL — SIGNIFICANT CHANGE UP (ref 13–17)
MAGNESIUM SERPL-MCNC: 2.1 MG/DL — SIGNIFICANT CHANGE UP (ref 1.6–2.6)
MAGNESIUM SERPL-MCNC: 2.1 MG/DL — SIGNIFICANT CHANGE UP (ref 1.6–2.6)
MCHC RBC-ENTMCNC: 32.8 PG — SIGNIFICANT CHANGE UP (ref 27–34)
MCHC RBC-ENTMCNC: 32.8 PG — SIGNIFICANT CHANGE UP (ref 27–34)
MCHC RBC-ENTMCNC: 34.9 GM/DL — SIGNIFICANT CHANGE UP (ref 32–36)
MCHC RBC-ENTMCNC: 34.9 GM/DL — SIGNIFICANT CHANGE UP (ref 32–36)
MCV RBC AUTO: 94.1 FL — SIGNIFICANT CHANGE UP (ref 80–100)
MCV RBC AUTO: 94.1 FL — SIGNIFICANT CHANGE UP (ref 80–100)
NRBC # BLD: 0 /100 WBCS — SIGNIFICANT CHANGE UP (ref 0–0)
NRBC # BLD: 0 /100 WBCS — SIGNIFICANT CHANGE UP (ref 0–0)
PHOSPHATE SERPL-MCNC: 3.5 MG/DL — SIGNIFICANT CHANGE UP (ref 2.5–4.5)
PHOSPHATE SERPL-MCNC: 3.5 MG/DL — SIGNIFICANT CHANGE UP (ref 2.5–4.5)
PLATELET # BLD AUTO: 233 K/UL — SIGNIFICANT CHANGE UP (ref 150–400)
PLATELET # BLD AUTO: 233 K/UL — SIGNIFICANT CHANGE UP (ref 150–400)
POTASSIUM SERPL-MCNC: 3.8 MMOL/L — SIGNIFICANT CHANGE UP (ref 3.5–5.3)
POTASSIUM SERPL-MCNC: 3.8 MMOL/L — SIGNIFICANT CHANGE UP (ref 3.5–5.3)
POTASSIUM SERPL-SCNC: 3.8 MMOL/L — SIGNIFICANT CHANGE UP (ref 3.5–5.3)
POTASSIUM SERPL-SCNC: 3.8 MMOL/L — SIGNIFICANT CHANGE UP (ref 3.5–5.3)
PROT SERPL-MCNC: 7.5 G/DL — SIGNIFICANT CHANGE UP (ref 6–8.3)
RBC # BLD: 4.6 M/UL — SIGNIFICANT CHANGE UP (ref 4.2–5.8)
RBC # BLD: 4.6 M/UL — SIGNIFICANT CHANGE UP (ref 4.2–5.8)
RBC # FLD: 12.5 % — SIGNIFICANT CHANGE UP (ref 10.3–14.5)
RBC # FLD: 12.5 % — SIGNIFICANT CHANGE UP (ref 10.3–14.5)
SODIUM SERPL-SCNC: 139 MMOL/L — SIGNIFICANT CHANGE UP (ref 135–145)
SODIUM SERPL-SCNC: 139 MMOL/L — SIGNIFICANT CHANGE UP (ref 135–145)
T PALLIDUM AB TITR SER: NEGATIVE — SIGNIFICANT CHANGE UP
T PALLIDUM AB TITR SER: NEGATIVE — SIGNIFICANT CHANGE UP
T3 SERPL-MCNC: 105 NG/DL — SIGNIFICANT CHANGE UP (ref 80–200)
T3 SERPL-MCNC: 105 NG/DL — SIGNIFICANT CHANGE UP (ref 80–200)
T4 FREE SERPL-MCNC: 1.2 NG/DL — SIGNIFICANT CHANGE UP (ref 0.93–1.7)
T4 FREE SERPL-MCNC: 1.2 NG/DL — SIGNIFICANT CHANGE UP (ref 0.93–1.7)
TOTAL IGE SMQN RAST: SIGNIFICANT CHANGE UP
TOTAL IGE SMQN RAST: SIGNIFICANT CHANGE UP
WBC # BLD: 6.27 K/UL — SIGNIFICANT CHANGE UP (ref 3.8–10.5)
WBC # BLD: 6.27 K/UL — SIGNIFICANT CHANGE UP (ref 3.8–10.5)
WBC # FLD AUTO: 6.27 K/UL — SIGNIFICANT CHANGE UP (ref 3.8–10.5)
WBC # FLD AUTO: 6.27 K/UL — SIGNIFICANT CHANGE UP (ref 3.8–10.5)

## 2023-10-23 PROCEDURE — 99232 SBSQ HOSP IP/OBS MODERATE 35: CPT

## 2023-10-23 PROCEDURE — 93306 TTE W/DOPPLER COMPLETE: CPT | Mod: 26

## 2023-10-23 PROCEDURE — 99233 SBSQ HOSP IP/OBS HIGH 50: CPT

## 2023-10-23 RX ORDER — HYDRALAZINE HCL 50 MG
5 TABLET ORAL ONCE
Refills: 0 | Status: COMPLETED | OUTPATIENT
Start: 2023-10-23 | End: 2023-10-23

## 2023-10-23 RX ORDER — SODIUM CHLORIDE 9 MG/ML
1000 INJECTION INTRAMUSCULAR; INTRAVENOUS; SUBCUTANEOUS
Refills: 0 | Status: DISCONTINUED | OUTPATIENT
Start: 2023-10-24 | End: 2023-10-24

## 2023-10-23 RX ORDER — MECLIZINE HCL 12.5 MG
12.5 TABLET ORAL EVERY 8 HOURS
Refills: 0 | Status: DISCONTINUED | OUTPATIENT
Start: 2023-10-23 | End: 2023-11-01

## 2023-10-23 RX ORDER — SODIUM CHLORIDE 9 MG/ML
1000 INJECTION INTRAMUSCULAR; INTRAVENOUS; SUBCUTANEOUS
Refills: 0 | Status: DISCONTINUED | OUTPATIENT
Start: 2023-10-23 | End: 2023-10-23

## 2023-10-23 RX ORDER — HYDRALAZINE HCL 50 MG
10 TABLET ORAL ONCE
Refills: 0 | Status: COMPLETED | OUTPATIENT
Start: 2023-10-23 | End: 2023-10-23

## 2023-10-23 RX ORDER — LISINOPRIL 2.5 MG/1
5 TABLET ORAL DAILY
Refills: 0 | Status: DISCONTINUED | OUTPATIENT
Start: 2023-10-24 | End: 2023-10-24

## 2023-10-23 RX ORDER — NICARDIPINE HYDROCHLORIDE 30 MG/1
5 CAPSULE, EXTENDED RELEASE ORAL
Qty: 40 | Refills: 0 | Status: DISCONTINUED | OUTPATIENT
Start: 2023-10-23 | End: 2023-10-24

## 2023-10-23 RX ADMIN — Medication 10 MILLIGRAM(S): at 14:55

## 2023-10-23 RX ADMIN — ATORVASTATIN CALCIUM 80 MILLIGRAM(S): 80 TABLET, FILM COATED ORAL at 22:09

## 2023-10-23 RX ADMIN — ENOXAPARIN SODIUM 40 MILLIGRAM(S): 100 INJECTION SUBCUTANEOUS at 17:58

## 2023-10-23 RX ADMIN — Medication 100 MILLIGRAM(S): at 22:09

## 2023-10-23 RX ADMIN — Medication 100 MILLIGRAM(S): at 10:49

## 2023-10-23 RX ADMIN — SODIUM CHLORIDE 90 MILLILITER(S): 9 INJECTION INTRAMUSCULAR; INTRAVENOUS; SUBCUTANEOUS at 12:50

## 2023-10-23 RX ADMIN — Medication 5 MILLIGRAM(S): at 12:52

## 2023-10-23 RX ADMIN — Medication 12.5 MILLIGRAM(S): at 12:53

## 2023-10-23 RX ADMIN — NICARDIPINE HYDROCHLORIDE 25 MG/HR: 30 CAPSULE, EXTENDED RELEASE ORAL at 17:30

## 2023-10-23 NOTE — PROGRESS NOTE ADULT - ASSESSMENT
57y Male with PMHx of HTN (noncompliant with meds) originally presented to [facility] ED c/o room spinning dizziness x 1 week worse with head movement. CTH with no acute intracranial injury. Microvascular disease. Chronic lacunar infarcts. CTA head and neck with mild dot-appearance to the bilateral ACAs and MCAs that may represent vasculitis. Short severe segmental narrowing of the left proximal A2 segment of the anterior cerebral artery. Severe focal narrowing of the left M2/M3 segment of the middle cerebral artery. Normal CTA of the extracranial circulation. No evidence of carotid stenosis. Admitted to MICU for stroke w/u and BP management. 57y Male with PMHx of HTN (noncompliant with meds) originally presented to [facility] ED c/o room spinning dizziness x 1 week worse with head movement. CTH with no acute intracranial injury. Microvascular disease. Chronic lacunar infarcts. CTA head and neck with mild dot-appearance to the bilateral ACAs and MCAs that may represent vasculitis. Short severe segmental narrowing of the left proximal A2 segment of the anterior cerebral artery. Severe focal narrowing of the left M2/M3 segment of the middle cerebral artery. Normal CTA of the extracranial circulation. No evidence of carotid stenosis. Admitted to MICU for stroke w/u and BP management.    See neuro note from earlier today.

## 2023-10-23 NOTE — DIETITIAN INITIAL EVALUATION ADULT - NS FNS DIET ORDER
Diet, NPO after Midnight:      NPO Start Date: 23-Oct-2023,   NPO Start Time: 23:59  Except Medications (10-23-23 @ 11:38)

## 2023-10-23 NOTE — DIETITIAN INITIAL EVALUATION ADULT - ADD RECOMMEND
1. Continue liberalized diet to promote intake, recommend ADD Ensure High Protein (350kcal, 20g protein) x1/day.   >>If pt accepts/tolerates, recommend increase oral nutrition supplement to x2/day.   >>Encourage & monitor PO intake. [place] dietary preferences as able.   2. Monitor GI tolerance, weight trends, labs, & skin integrity.  3. Defer bowel and pain regimens to team.   4. RD to remain available for diet education/intervention prn.

## 2023-10-23 NOTE — DIETITIAN INITIAL EVALUATION ADULT - OTHER CALCULATIONS
Estimated needs based on IBW as dosing wt 200lb/90.7kg >100% IBW (112%) in ICU setting. Needs adjusted for age, BMI, and clinical status.

## 2023-10-23 NOTE — PROGRESS NOTE ADULT - SUBJECTIVE AND OBJECTIVE BOX
Neurology Stroke Progress Note    INTERVAL HPI/OVERNIGHT EVENTS:  No acute overnight events. PT was transitioned off cardene gtt and initiated on labetalol 100mg BID. sBP up to 186 overnight, given labetalol 100mg PO around midnight with improvement in BP, Patient seen and examined. Pt states he is feeling well, in no acute distress. Endorsed feeling dizzy while walking, otherwise denies complaints at this time. Pt is laying in bed comfortably.     MEDICATIONS  (STANDING):  atorvastatin 80 milliGRAM(s) Oral at bedtime  enoxaparin Injectable 40 milliGRAM(s) SubCutaneous every 24 hours  influenza   Vaccine 0.5 milliLiter(s) IntraMuscular once  labetalol 100 milliGRAM(s) Oral every 12 hours    MEDICATIONS  (PRN):      Allergies    No Known Allergies    Intolerances        Vital Signs Last 24 Hrs  T(C): 36.9 (23 Oct 2023 06:31), Max: 36.9 (23 Oct 2023 06:31)  T(F): 98.4 (23 Oct 2023 06:31), Max: 98.4 (23 Oct 2023 06:31)  HR: 90 (23 Oct 2023 08:00) (60 - 94)  BP: 151/95 (23 Oct 2023 08:00) (139/93 - 220/130)  BP(mean): 118 (23 Oct 2023 08:00) (110 - 167)  RR: 17 (23 Oct 2023 08:00) (16 - 37)  SpO2: 96% (23 Oct 2023 08:00) (91% - 98%)    Parameters below as of 23 Oct 2023 08:00  Patient On (Oxygen Delivery Method): room air        Physical exam:  General: No acute distress, awake and alert  Eyes: Anicteric sclerae, moist conjunctivae, see below for CNs  Neck: trachea midline, FROM, supple, no thyromegaly or lymphadenopathy  Cardiovascular: Regular rate and rhythm, no murmurs, rubs, or gallops. No carotid bruits.   Pulmonary: Anterior breath sounds clear bilaterally, no crackles or wheezing. No use of accessory muscles  GI: Abdomen soft, non-distended, non-tender  Extremities: Radial and DP pulses +2, no edema    Neurologic:  -Mental status: Awake, alert, oriented to person, place, and time. Has insight as to why he was admitted to the hospital. Speech is fluent but slowed, with intact naming, repetition, and comprehension, no dysarthria. Recent and remote memory intact. Follows commands. Attention/concentration intact. Fund of knowledge appropriate.  -Cranial nerves:   II: Visual fields are full to confrontation.  III, IV, VI: Horizontal right beating nystagmus on rightward and upward gaze. Otherwise EOM intact. Pupils equally round and reactive to light  V:  Facial sensation V1-V3 equal and intact   VII: Face is symmetric with normal eye closure and smile  VIII: Hearing is bilaterally intact to finger rub  IX, X: Uvula is midline and soft palate rises symmetrically  XI: Head turning and shoulder shrug are intact.  XII: Tongue protrudes midline  Motor: Normal bulk and tone. No pronator drift. Strength bilateral upper extremity 5/5, bilateral lower extremities 5/5.  Sensation: Intact to light touch bilaterally. No neglect or extinction on double simultaneous testing.  Coordination: No dysmetria on finger-to-nose and heel-to-shin bilaterally  Reflexes: Downgoing toes bilaterally   Gait: Deferred       LABS:                        15.1   6.27  )-----------( 233      ( 23 Oct 2023 05:30 )             43.3     10-23    139  |  105  |  17  ----------------------------<  100<H>  3.8   |  25  |  1.12    Ca    9.3      23 Oct 2023 05:30  Phos  3.5     10-23  Mg     2.1     10-23    Urinalysis Basic - ( 23 Oct 2023 05:30 )    Color: x / Appearance: x / SG: x / pH: x  Gluc: 100 mg/dL / Ketone: x  / Bili: x / Urobili: x   Blood: x / Protein: x / Nitrite: x   Leuk Esterase: x / RBC: x / WBC x   Sq Epi: x / Non Sq Epi: x / Bacteria: x    RADIOLOGY & ADDITIONAL TESTS:    < from: MR Head No Cont (10.22.23 @ 11:51) >  IMPRESSION: Punctate foci of acute ischemia within the right periatrial   white matter and left centrum semiovale ovale.    The SWI images demonstrates multiple scattered punctate foci of   microhemorrhages.    < from: MR Angio Head No Cont (10.22.23 @ 11:52) >  IMPRESSION:    1. ANTERIOR CIRCULATION:   Intracranial atherosclerosis cavernous and   clinoid segments of the internal carotid arteries, mild on the right and   mild-to-moderate on the left; left anterior cerebral arterial A2 segment,   severe; and left MCA post bifurcation segments, moderate.    2. POSTERIOR CIRCULATION:  Intact.    < end of copied text >     Neurology Stroke Progress Note    INTERVAL HPI/OVERNIGHT EVENTS:  No acute overnight events. PT was transitioned off cardene gtt and initiated on labetalol 100mg BID. sBP up to 186 overnight, given labetalol 100mg PO around midnight with improvement in BP, Patient seen and examined. Pt states he is feeling well, in no acute distress. Endorsed feeling dizzy while walking, otherwise denies complaints at this time. Pt is laying in bed comfortably.     MEDICATIONS  (STANDING):  atorvastatin 80 milliGRAM(s) Oral at bedtime  enoxaparin Injectable 40 milliGRAM(s) SubCutaneous every 24 hours  influenza   Vaccine 0.5 milliLiter(s) IntraMuscular once  labetalol 100 milliGRAM(s) Oral every 12 hours    MEDICATIONS  (PRN):      Allergies    No Known Allergies    Intolerances        Vital Signs Last 24 Hrs  T(C): 36.9 (23 Oct 2023 06:31), Max: 36.9 (23 Oct 2023 06:31)  T(F): 98.4 (23 Oct 2023 06:31), Max: 98.4 (23 Oct 2023 06:31)  HR: 90 (23 Oct 2023 08:00) (60 - 94)  BP: 151/95 (23 Oct 2023 08:00) (139/93 - 220/130)  BP(mean): 118 (23 Oct 2023 08:00) (110 - 167)  RR: 17 (23 Oct 2023 08:00) (16 - 37)  SpO2: 96% (23 Oct 2023 08:00) (91% - 98%)    Parameters below as of 23 Oct 2023 08:00  Patient On (Oxygen Delivery Method): room air        Physical exam:  General: No acute distress, awake and alert  Eyes: Anicteric sclerae, moist conjunctivae, see below for CNs  Neck: trachea midline, FROM, supple, no thyromegaly or lymphadenopathy  Cardiovascular: Regular rate and rhythm, no murmurs, rubs, or gallops. No carotid bruits.   Pulmonary: Anterior breath sounds clear bilaterally, no crackles or wheezing. No use of accessory muscles  GI: Abdomen soft, non-distended, non-tender  Extremities: Radial and DP pulses +2, no edema    Neurologic:  -Mental status: Awake, alert, oriented to person, place, and time. Has insight as to why he was admitted to the hospital. Speech is fluent but slowed, with intact naming, repetition, and comprehension, no dysarthria. Recent and remote memory intact. Follows commands. Attention/concentration intact. Fund of knowledge appropriate.  -Cranial nerves:   II: Visual fields are full to confrontation.  III, IV, VI: Horizontal right beating nystagmus on rightward and upward gaze. Otherwise EOM intact. Pupils equally round and reactive to light  V:  Facial sensation V1-V3 equal and intact   VII: Face is symmetric with normal eye closure and smile  VIII: Hearing is bilaterally intact to finger rub  IX, X: Uvula is midline and soft palate rises symmetrically  XI: Head turning and shoulder shrug are intact.  XII: Tongue protrudes midline  Motor: Normal bulk and tone. No pronator drift. Strength bilateral upper extremity 5/5, bilateral lower extremities 5/5.  Sensation: Intact to light touch bilaterally. No neglect or extinction on double simultaneous testing.  Coordination: No dysmetria on finger-to-nose and heel-to-shin bilaterally  Reflexes: Downgoing toes bilaterally   Gait: Deferred       LABS:                        15.1   6.27  )-----------( 233      ( 23 Oct 2023 05:30 )             43.3     10-23    139  |  105  |  17  ----------------------------<  100<H>  3.8   |  25  |  1.12    Ca    9.3      23 Oct 2023 05:30  Phos  3.5     10-23  Mg     2.1     10-23    Urinalysis Basic - ( 23 Oct 2023 05:30 )    Color: x / Appearance: x / SG: x / pH: x  Gluc: 100 mg/dL / Ketone: x  / Bili: x / Urobili: x   Blood: x / Protein: x / Nitrite: x   Leuk Esterase: x / RBC: x / WBC x   Sq Epi: x / Non Sq Epi: x / Bacteria: x    RADIOLOGY & ADDITIONAL TESTS:    < from: MR Head No Cont (10.22.23 @ 11:51) >  IMPRESSION: Punctate foci of acute ischemia within the right periatrial   white matter and left centrum semiovale ovale.    The SWI images demonstrates multiple scattered punctate foci of   microhemorrhages.    < from: MR Angio Head No Cont (10.22.23 @ 11:52) >  IMPRESSION:    1. ANTERIOR CIRCULATION:   Intracranial atherosclerosis cavernous and   clinoid segments of the internal carotid arteries, mild on the right and   mild-to-moderate on the left; left anterior cerebral arterial A2 segment,   severe; and left MCA post bifurcation segments, moderate.    2. POSTERIOR CIRCULATION:  Intact.    < end of copied text >    < from: Echocardiogram w/ Bubble and Doppler (10.23.23 @ 09:29) >  CONCLUSIONS:     1. Normal left ventricular systolic function.   2. Moderate symmetric left ventricular hypertrophy.   3. Mildly dilated right ventricular size.   4. Probably normal right ventricular systolic function.   5. Normal atria.   6. Injection of agitated saline via a peripheral vein reveals no   evidence of a right-to-left shunt. Suboptimal image quality limits   evaluation.   7. No significant valvular disease.   8. No evidence of pulmonary hypertension.   9. No pericardial effusion.  10. The aortic root is borderline dilated. The aortic root is dilated   measuring 4.10 cm.  11. No prior echo is available for comparison.    < end of copied text >

## 2023-10-23 NOTE — DIETITIAN INITIAL EVALUATION ADULT - OTHER INFO
57M with PMHx of HTN (noncompliant with meds) originally presented to [facility] ED c/o room spinning dizziness x 1 week worse with head movement. CTH with no acute intracranial injury. Microvascular disease. Chronic lacunar infarcts. CTA head and neck concerning for vasculitis. Admitted and managed for hypertensive emergency (SBPs 230s), on Cardene drip. Patient with change in neuro exam when SBP to 130s, and improved with augmentation. Stroke consulted, NIHSS 0 throughout. CT imaging unchanged. Admitted to MICU for stroke workup. Neurosurgery consulted for consideration of formal cerebral angiogram given CTA findings.     Pt seen on 7EA for nutrition assessment. Labs and medication orders reviewed. Electrolytes WNL. On Regular diet. Pt engaged with team on multiple attempts at interview, deferred to RN. Reports pt with variable intake, good completion of meals yesterday 10/22 but poor intake today 10/23. Denies pt with nausea/vomiting/diarrhea, reports no BM since admission 10/21. Denies pt with difficulty chewing/swallowing. NKFA. No Baptism/ethnic/cultural food preferences noted. No pressure injuries or edema documented, Jefferson score 21. See nutrition recommendations. RD to remain available.

## 2023-10-23 NOTE — DIETITIAN INITIAL EVALUATION ADULT - PERTINENT LABORATORY DATA
10-23    139  |  105  |  17  ----------------------------<  100<H>  3.8   |  25  |  1.12    Ca    9.3      23 Oct 2023 05:30  Phos  3.5     10-23  Mg     2.1     10-23    TPro  7.5  /  Alb  x   /  TBili  x   /  DBili  x   /  AST  x   /  ALT  x   /  AlkPhos  x   10-22  A1C with Estimated Average Glucose Result: 5.7 % (10-22-23 @ 05:30)

## 2023-10-23 NOTE — DIETITIAN INITIAL EVALUATION ADULT - PERTINENT MEDS FT
MEDICATIONS  (STANDING):  atorvastatin 80 milliGRAM(s) Oral at bedtime  enoxaparin Injectable 40 milliGRAM(s) SubCutaneous every 24 hours  influenza   Vaccine 0.5 milliLiter(s) IntraMuscular once  labetalol 100 milliGRAM(s) Oral every 12 hours  meclizine 12.5 milliGRAM(s) Oral every 8 hours    MEDICATIONS  (PRN):

## 2023-10-23 NOTE — CONSULT NOTE ADULT - ASSESSMENT
MICU    57 y o Male with PMHx of HTN (noncompliant with meds) originally presented to [facility] ED c/o room spinning dizziness x 1 week worse with head movement. CTH with no acute intracranial injury. Microvascular disease. Chronic lacunar infarcts. CTA head and neck with mild dot-appearance to the bilateral ACAs and MCAs that may represent vasculitis. Short severe segmental narrowing of the left proximal A2 segment of the anterior cerebral artery. Severe focal narrowing of the left M2/M3 segment of the middle cerebral artery. Normal CTA of the extracranial circulation. No evidence of carotid stenosis. Was given total of 30mg labetalol and 30mg hydralazine with SBP still 230s and so cardene gtt was started. Given imaging findings, stroke was consulted. Upon initial examination, NIHSS 0. Was awaiting MICU bed for admission under the stroke service for a stroke workup. Patient then became confused/aphasic in setting of SBP 130s. Stroke code was called. CT imaging unchanged. Exam slowly improving with SBP in the low 200s. Discussed case with Dr. Myles, since exam is improving and no change in imaging, likely symptoms are due to significant drop in BP. Admitted to MICU for stroke w/u.    Neuro  #CVA workup  - d/c aspirin and plavix in anticipation of LP  - continue atorvastatin 80mg daily  - q1hr stroke neuro checks and vitals  - MRI with punctate foci of acute ischemia within the right periatrial white matter and left centrum semiovale ovale. The SWI images demonstrates multiple scattered punctate foci of micro hemorrhages. Flair reviewed by Dr. Myles, possible vasculitic lesions.   - MRA - f/u read  - Stroke Code HCT Results: No acute intracranial injury. Microvascular disease. Chronic lacunar infarcts.  - Stroke Code CTA Results: Mild dot-appearance to the bilateral ACAs and MCAs that may represent vasculitis. Short severe segmental narrowing of the left proximal A2 segment of the anterior cerebral artery, unchanged. Severe focal narrowing of the left M2/M3 segment of the middle cerebral artery, unchanged.  - Stroke education  - vasculitis panel ordered  - neurosurgery consulted for DSA timing  - LP timing to be determined    Cards  #HTN  - SBP goal 160-180, can give PRN pushes if above 180  - started labetalol 100mg BID  - amlodipine d/c  - does not take home BP meds, does not know what they are  - obtain TTE with bubble    #HLD  - high dose statin as above in CVA  - LDL results: 156    Pulm  - call provider if SPO2 < 94%    GI  #Nutrition/Fluids/Electrolytes   - replete K<4 and Mg <2  - Diet: reg diet  - IVF: n/a    Renal  - trend BMP    Infectious Disease  - amy    Endocrine  - A1C results: 5.7  - TSH results: 5.090  - free T4 and T3 pending    DVT Prophylaxis  - lovenox sq for DVT prophylaxis   - SCDs for DVT prophylaxis     Dispo: MICU

## 2023-10-23 NOTE — PROGRESS NOTE ADULT - NS ATTEND AMEND GEN_ALL_CORE FT
57 year old man w/ PMH of HTN (non compliant) presented w/ intermittent roomspinning dizziness. On exam, patient w/ unidirectional R. beating nystagmus on horizontal and vertical gaze, otherwise normal.     CTH negative  CTA h/n demonstrated multifocal ICAD, including severe stenosis of the L. A2, severe stenosis of the L. M2/M3, L > R. supraclinoid ICA stenosis.   CTP demonstrates Tmax >6s delay of 53cc in both hemispheres  MR brain demonstrates punctate infarction involving the R. white matter and L. centrum semiovale. Multiple chronic lacunar infarctions; Multiple +SWI microhemorrhages involving both cerebellar hemispheres, and bihemispheric.   A1c 5.7     ESR wnl; CRP wnl   RF 20 (mildly elevated)   Utox negative  TTE EF wnl; Moderate LVH; Normal LA; No PFO.     Impression:   1. Intermittent vertigo exacerbated by head movements w/ unidirectional right-beating nystagmus -- etiology likely secondary to BPPV.   2. Incidental bilateral cerebral infarction in setting of multifocal intracranial stenosis -- mechanism of stroke uncertain, possibilities include intracranial large artery atherosclerotic disease versus less likely vasculitis.     Plan:   Pending DSA tomorrow  Defer LP for now; Decision depending on angio  Holding DAPT for now given possible LP; If deemed to be ICAD, will tx w/ DAPT for 90d per [facility]  Rheumatology consult   SBP goal 120-180; Currently on Labetalol 100mg BID; Will likely need additional agent   May need JONNY and ILR depending on angio results   Lovenox SQ for DVT ppx     45 minutes spent on total encounter. The necessity of the time spent during the encounter on this date of service was due to:     Review of imaging and chart; obtaining history; examination of pt; discussion and coordination of care, and discussion of lifestyle modification and risk factor control.

## 2023-10-23 NOTE — PROGRESS NOTE ADULT - ASSESSMENT
57y Male with PMHx of HTN (noncompliant with meds) originally presented to [facility] ED c/o room spinning dizziness x 1 week worse with head movement. CTH with no acute intracranial injury. Microvascular disease. Chronic lacunar infarcts. CTA head and neck with mild dot-appearance to the bilateral ACAs and MCAs that may represent vasculitis. Short severe segmental narrowing of the left proximal A2 segment of the anterior cerebral artery. Severe focal narrowing of the left M2/M3 segment of the middle cerebral artery. Normal CTA of the extracranial circulation. No evidence of carotid stenosis. Was given total of 30mg labetalol and 30mg hydralazine with SBP still 230s and so cardene gtt was started. Given imaging findings, stroke was consulted. Upon initial examination, NIHSS 0. Was awaiting MICU bed for admission under the stroke service for a stroke workup. Patient then became confused/aphasic in setting of SBP 130s. Stroke code was called. CT imaging unchanged. Exam slowly improving with SBP in the low 200s. Discussed case with Dr. Myles, since exam is improving and no change in imaging, likely symptoms are due to significant drop in BP. Admitted to MICU for stroke w/u. MRI brain punctate right periatrial white matter and left centrum semiovale, scattered punctate foci of microhemorrhages. MRA head with ICAD in L>R clinoid segments of ICA, left A2 severe narrowing, moderate left MCA post bifurcation narrowing.      Neuro  #CVA workup  - d/c aspirin and plavix in anticipation of LP  - continue atorvastatin 80mg daily  - q1hr stroke neuro checks and vitals  - MRI with punctate foci of acute ischemia within the right periatrial white matter and left centrum semiovale ovale. The SWI images demonstrates multiple scattered punctate foci of   micro hemorrhages. Flair reviewed by Dr. Myles, possible vasculitic lesions.   - MRA head -  ICAD in L>R clinoid segments of ICA, left A2 severe narrowing, moderate left MCA post bifurcation narrowing.    - Stroke Code HCT Results: No acute intracranial injury. Microvascular disease. Chronic lacunar infarcts.  - Stroke Code CTA Results: Mild dot-appearance to the bilateral ACAs and MCAs that may represent vasculitis. Short severe segmental narrowing of the left proximal A2 segment of the anterior cerebral artery, unchanged. Severe focal narrowing of the left M2/M3 segment of the middle cerebral artery, unchanged.  - Stroke education  - vasculitis panel ordered, thus far RF +, EBV titers c/w prior infection   - neurosurgery consulted for DSA, plan for Tuesday 10/24  - LP timing to be determined    Cards  #HTN  - SBP goal 160-180, can give PRN pushes if above 180  - continue labetalol 100mg BID  - amlodipine d/c  - does not take home BP meds, does not know what they are  - obtain TTE with bubble    #HLD  - high dose statin as above in CVA  - LDL results: 156    Pulm  - call provider if SPO2 < 94%    GI  #Nutrition/Fluids/Electrolytes   - replete K<4 and Mg <2  - Diet: reg diet  - IVF: n/a    Renal  - trend BMP    Infectious Disease  - amy    Endocrine  - A1C results: 5.7  - TSH results: 5.090  - free T4 1.200, T3 pending     DVT Prophylaxis  - lovenox sq for DVT prophylaxis   - SCDs for DVT prophylaxis     Dispo: home PT/OT      Discussed daily hospital plans and goals with patient.     Discussed with Neurology Attending Dr. Romero      57y Male with PMHx of HTN (noncompliant with meds) originally presented to [facility] ED c/o room spinning dizziness x 1 week worse with head movement. CTH with no acute intracranial injury. Microvascular disease. Chronic lacunar infarcts. CTA head and neck with mild dot-appearance to the bilateral ACAs and MCAs that may represent vasculitis. Short severe segmental narrowing of the left proximal A2 segment of the anterior cerebral artery. Severe focal narrowing of the left M2/M3 segment of the middle cerebral artery. Normal CTA of the extracranial circulation. No evidence of carotid stenosis. Was given total of 30mg labetalol and 30mg hydralazine with SBP still 230s and so cardene gtt was started. Given imaging findings, stroke was consulted. Upon initial examination, NIHSS 0. Was awaiting MICU bed for admission under the stroke service for a stroke workup. Patient then became confused/aphasic in setting of SBP 130s. Stroke code was called. CT imaging unchanged. Exam slowly improving with SBP in the low 200s. Discussed case with Dr. Myles, since exam is improving and no change in imaging, likely symptoms are due to significant drop in BP. Admitted to MICU for stroke w/u. MRI brain punctate right periatrial white matter and left centrum semiovale, scattered punctate foci of microhemorrhages. MRA head with ICAD in L>R clinoid segments of ICA, left A2 severe narrowing, moderate left MCA post bifurcation narrowing.      Neuro  #CVA workup  - d/c aspirin and plavix in anticipation of LP  - continue atorvastatin 80mg daily  - q1hr stroke neuro checks and vitals  - MRI with punctate foci of acute ischemia within the right periatrial white matter and left centrum semiovale ovale. The SWI images demonstrates multiple scattered punctate foci of   micro hemorrhages. Flair reviewed by Dr. Myles, possible vasculitic lesions.   - MRA head -  ICAD in L>R clinoid segments of ICA, left A2 severe narrowing, moderate left MCA post bifurcation narrowing.    - Stroke Code HCT Results: No acute intracranial injury. Microvascular disease. Chronic lacunar infarcts.  - Stroke Code CTA Results: Mild dot-appearance to the bilateral ACAs and MCAs that may represent vasculitis. Short severe segmental narrowing of the left proximal A2 segment of the anterior cerebral artery, unchanged. Severe focal narrowing of the left M2/M3 segment of the middle cerebral artery, unchanged.  - Stroke education  - rheumatology consulted, appreciate recs. ordered hepatitis panel in AM due to elevated RF, added cyclic citrullinated peptide AB for AM.   - vasculitis panel ordered, thus far RF +, EBV titers c/w prior infection   - neurosurgery consulted for DSA, plan for Tuesday 10/24  - LP +/- dependent on DSA findings     #BPPV - pt endorses dizzines upon positional changes  - start meclizine 12.5mg TID, may uptitrate   - obtain orthostatics     Cards  #HTN  - SBP goal 160-180, can give PRN pushes if above 180  - continue labetalol 100mg BID  - amlodipine d/c  - does not take home BP meds, does not know what they are  - TTE with bubble - EF 70-75%, no PFO    #HLD  - high dose statin as above in CVA  - LDL results: 156    Pulm  - call provider if SPO2 < 94%    GI  #Nutrition/Fluids/Electrolytes   - replete K<4 and Mg <2  - Diet: reg diet  - IVF: n/a    Renal  - trend BMP    Infectious Disease  - amy    Endocrine  - A1C results: 5.7  - TSH results: 5.090  - free T4 1.200, T3 pending     DVT Prophylaxis  - lovenox sq for DVT prophylaxis   - SCDs for DVT prophylaxis     Dispo: home PT/OT      Discussed daily hospital plans and goals with patient.     Discussed with Neurology Attending Dr. Romero

## 2023-10-23 NOTE — PROGRESS NOTE ADULT - SUBJECTIVE AND OBJECTIVE BOX
OVERNIGHT EVENTS: AYESHA, VSS.     SUBJECTIVE / INTERVAL HPI: Patient seen and examined at bedside. Feeling well. Received IV [id number] and 5 today for SBPS>180. No HA, CP, SOB.    PHYSICAL EXAM:    General: NAD  HEENT: PERRL, anicteric sclera; MMM  Neck: supple  Cardiovascular: RRR  Respiratory: CTA B/L; normal wob  Gastrointestinal: soft, NT/ND; +BS  Extremities: WWP; no edema, clubbing or cyanosis  Vascular: 2+ radial, DP/PT pulses B/L  Neurological: AAOx3; no focal deficits; weak but symmetric hand , otherwise 5/5 strength, sensation grossly intact  Psychiatric: pleasant mood and affect  Dermatologic: no appreciable wounds or damage to the skin    VITAL SIGNS:  Vital Signs Last 24 Hrs  T(C): 36.6 (23 Oct 2023 15:00), Max: 36.9 (23 Oct 2023 06:31)  T(F): 97.8 (23 Oct 2023 15:00), Max: 98.4 (23 Oct 2023 06:31)  HR: 80 (23 Oct 2023 14:50) (60 - 94)  BP: 174/93 (23 Oct 2023 14:50) (139/93 - 208/138)  BP(mean): 126 (23 Oct 2023 14:50) (110 - 167)  RR: 17 (23 Oct 2023 14:50) (12 - 21)  SpO2: 96% (23 Oct 2023 14:50) (91% - 98%)    Parameters below as of 23 Oct 2023 14:50  Patient On (Oxygen Delivery Method): room air          MEDICATIONS:  MEDICATIONS  (STANDING):  atorvastatin 80 milliGRAM(s) Oral at bedtime  enoxaparin Injectable 40 milliGRAM(s) SubCutaneous every 24 hours  influenza   Vaccine 0.5 milliLiter(s) IntraMuscular once  labetalol 100 milliGRAM(s) Oral every 12 hours  meclizine 12.5 milliGRAM(s) Oral every 8 hours    MEDICATIONS  (PRN):      ALLERGIES:  Allergies    No Known Allergies    Intolerances        LABS:                        15.1   6.27  )-----------( 233      ( 23 Oct 2023 05:30 )             43.3     10-23    139  |  105  |  17  ----------------------------<  100<H>  3.8   |  25  |  1.12    Ca    9.3      23 Oct 2023 05:30  Phos  3.5     10-23  Mg     2.1     10-23    TPro  7.5  /  Alb  x   /  TBili  x   /  DBili  x   /  AST  x   /  ALT  x   /  AlkPhos  x   10-22      Urinalysis Basic - ( 23 Oct 2023 05:30 )    Color: x / Appearance: x / SG: x / pH: x  Gluc: 100 mg/dL / Ketone: x  / Bili: x / Urobili: x   Blood: x / Protein: x / Nitrite: x   Leuk Esterase: x / RBC: x / WBC x   Sq Epi: x / Non Sq Epi: x / Bacteria: x      CAPILLARY BLOOD GLUCOSE  114 (21 Oct 2023 18:17)      POCT Blood Glucose.: 114 mg/dL (21 Oct 2023 17:04)      RADIOLOGY & ADDITIONAL TESTS: Reviewed.

## 2023-10-23 NOTE — PROGRESS NOTE ADULT - SUBJECTIVE AND OBJECTIVE BOX
Procedure: Diagnostic Cerebral Angiogram  Doctor: Dr. Lara  Consent: Signed by:                   NAME/NUMBER if not patient:     No Known Allergies      SUBJECTIVE:  57M with PMH of HTN (noncompliant) originally presented to [facility] ED c/o room spinning dizziness x 1 week worse with head movement. CT Head with no acute intracranial injury. Microvascular disease. Chronic lacunar infarcts. 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 Admitted and managed for hypertensive emergency (SBPs 230s), on Cardene drip. Patient with change in neuro exam when SBP to 130s, and improved with augmentation. Stroke consulted, NIHSS 0 throughout. Neurosurgery consulted for consideration of formal cerebral angiogram given CTA findings. Patient without complaints at time of this evaluation.    T(C): 36.6 (10-23-23 @ 09:37), Max: 36.9 (10-23-23 @ 06:31)  HR: 68 (10-23-23 @ 10:00) (60 - 94)  BP: 179/105 (10-23-23 @ 10:00) (139/93 - 220/130)  RR: 14 (10-23-23 @ 10:00) (14 - 37)  SpO2: 94% (10-23-23 @ 10:00) (91% - 98%)  Wt(kg): --    EXAM:   Gen: NAD, AAOx3  HEENT: PERRL. EOMI. VF intact. NC/AT.  Neck: FROM, nontender  Neuro: CNs II-XII intact. 5/5 str x4 extremities. Sensation to LT intact. Speech clear. Following commands. Negative pronator drift.      10-23    139  |  105  |  17  ----------------------------<  100<H>  3.8   |  25  |  1.12    Ca    9.3      23 Oct 2023 05:30  Phos  3.5     10-23  Mg     2.1     10-23    TPro  7.5  /  Alb  x   /  TBili  x   /  DBili  x   /  AST  x   /  ALT  x   /  AlkPhos  x   10-22    CBC Full  -  ( 23 Oct 2023 05:30 )  WBC Count : 6.27 K/uL  RBC Count : 4.60 M/uL  Hemoglobin : 15.1 g/dL  Hematocrit : 43.3 %  Platelet Count - Automated : 233 K/uL  Mean Cell Volume : 94.1 fl  Mean Cell Hemoglobin : 32.8 pg  Mean Cell Hemoglobin Concentration : 34.9 gm/dL  Auto Neutrophil # : x  Auto Lymphocyte # : x  Auto Monocyte # : x  Auto Eosinophil # : x  Auto Basophil # : x  Auto Neutrophil % : x  Auto Lymphocyte % : x  Auto Monocyte % : x  Auto Eosinophil % : x  Auto Basophil % : x        Pregnancy test (serum hcg for any female under 56, must be resulted day before OR): [ ] Negative Result  [ ] Positive Result  [ x] N/A : male or female>55 y/o      COVID swab (in past 48hrs): _ Y  _N    CXR:  EKG:  ECHO if available:  Medical Clearances:  Other Clearances:     Heparin drip:               If yes --> Needs to be held 2 hours prior to angiogram, order placed: []yes   []no, primary team aware []yes   []no   []n/a    Contrast allergy: [] yes   [x] no  If yes --> premedication ordered []y []n    Implanted Devices (pacemaker, drug pump...etc):  []YES   [x] NO                  If yes --> EPS consulted to interrogate device: [ ] YES  [ ] NO                            If yes -->  EPS called to let them know patient going for procedure: [ ] device needs to be turned off                                                                                                                                                 [ ] magnet needs to be placed for surgery                                                                                                                                                [ ] nothing to do per EP, may proceed with cautery use in OR                                       Assessment:  57M with PMH of HTN (noncompliant) originally presented to [facility] ED c/o room spinning dizziness x 1 week worse with head movement.     Plan:  - Consent for <Diagnostic Cerebral Angiogram> obtained and in chart, all risks, benefits and alternatives discussed including risk of bleeding at access site or in the brain that can cause stroke like symptoms, infection, acute kidney injury.  - NPO/IVF    d/w Dr. Lara    Assessment:  Present when checked    []  GCS  E   V  M     Heart Failure: []Acute, [] acute on chronic , []chronic  Heart Failure:  [] Diastolic (HFpEF), [] Systolic (HFrEF), []Combined (HFpEF and HFrEF), [] RHF, [] Pulm HTN, [] Other    [] SUSAN, [] ATN, [] AIN, [] other  [] CKD1, [] CKD2, [] CKD 3, [] CKD 4, [] CKD 5, []ESRD    Encephalopathy: [] Metabolic, [] Hepatic, [] toxic, [] Neurological, [] Other    Abnormal Nurtitional Status: [] malnurtition (see nutrition note), [ ]underweight: BMI < 19, [] morbid obesity: BMI >40, [] Cachexia    [] Sepsis  [] hypovolemic shock,[] cardiogenic shock, [] hemorrhagic shock, [] neuogenic shock  [] Acute Respiratory Failure  []Cerebral edema, [] Brain compression/ herniation,   [] Functional quadriplegia  [] Acute blood loss anemia   Procedure: Diagnostic Cerebral Angiogram  Doctor: Dr. Lara  Consent: Signed by patient                   NAME/NUMBER if not patient:     No Known Allergies      SUBJECTIVE:  57M with PMH of HTN (noncompliant) originally presented to [facility] ED c/o room spinning dizziness x 1 week worse with head movement. CT Head with no acute intracranial injury. Microvascular disease. Chronic lacunar infarcts. 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 Admitted and managed for hypertensive emergency (SBPs 230s), on Cardene drip. Patient with change in neuro exam when SBP to 130s, and improved with augmentation. Stroke consulted, NIHSS 0 throughout. Neurosurgery consulted for consideration of formal cerebral angiogram given CTA findings. Patient without complaints at time of this evaluation.    T(C): 36.6 (10-23-23 @ 09:37), Max: 36.9 (10-23-23 @ 06:31)  HR: 68 (10-23-23 @ 10:00) (60 - 94)  BP: 179/105 (10-23-23 @ 10:00) (139/93 - 220/130)  RR: 14 (10-23-23 @ 10:00) (14 - 37)  SpO2: 94% (10-23-23 @ 10:00) (91% - 98%)  Wt(kg): --    EXAM:   Gen: NAD, AAOx3  HEENT: PERRL. EOMI. VF intact. NC/AT.  Neck: FROM, nontender  Neuro: CNs II-XII intact. 5/5 str x4 extremities. Sensation to LT intact. Speech clear. Following commands. Negative pronator drift.      10-23    139  |  105  |  17  ----------------------------<  100<H>  3.8   |  25  |  1.12    Ca    9.3      23 Oct 2023 05:30  Phos  3.5     10-23  Mg     2.1     10-23    TPro  7.5  /  Alb  x   /  TBili  x   /  DBili  x   /  AST  x   /  ALT  x   /  AlkPhos  x   10-22    CBC Full  -  ( 23 Oct 2023 05:30 )  WBC Count : 6.27 K/uL  RBC Count : 4.60 M/uL  Hemoglobin : 15.1 g/dL  Hematocrit : 43.3 %  Platelet Count - Automated : 233 K/uL  Mean Cell Volume : 94.1 fl  Mean Cell Hemoglobin : 32.8 pg  Mean Cell Hemoglobin Concentration : 34.9 gm/dL  Auto Neutrophil # : x  Auto Lymphocyte # : x  Auto Monocyte # : x  Auto Eosinophil # : x  Auto Basophil # : x  Auto Neutrophil % : x  Auto Lymphocyte % : x  Auto Monocyte % : x  Auto Eosinophil % : x  Auto Basophil % : x        Pregnancy test (serum hcg for any female under 56, must be resulted day before OR): [ ] Negative Result  [ ] Positive Result  [ x] N/A : male or female>57 y/o      COVID swab (in past 48hrs): _ Y  _N    CXR:  EKG:  ECHO if available:  Medical Clearances:  Other Clearances:     Heparin drip:               If yes --> Needs to be held 2 hours prior to angiogram, order placed: []yes   []no, primary team aware []yes   []no   []n/a    Contrast allergy: [] yes   [x] no  If yes --> premedication ordered []y []n    Implanted Devices (pacemaker, drug pump...etc):  []YES   [x] NO                  If yes --> EPS consulted to interrogate device: [ ] YES  [ ] NO                            If yes -->  EPS called to let them know patient going for procedure: [ ] device needs to be turned off                                                                                                                                                 [ ] magnet needs to be placed for surgery                                                                                                                                                [ ] nothing to do per EP, may proceed with cautery use in OR                                       Assessment:  57M with PMH of HTN (noncompliant) originally presented to [facility] ED c/o room spinning dizziness x 1 week worse with head movement.     Plan:  - Consent for <Diagnostic Cerebral Angiogram> obtained and in chart, all risks, benefits and alternatives discussed including risk of bleeding at access site or in the brain that can cause stroke like symptoms, infection, acute kidney injury.  - NPO/IVF    d/w Dr. Lara    Assessment:  Present when checked    []  GCS  E   V  M     Heart Failure: []Acute, [] acute on chronic , []chronic  Heart Failure:  [] Diastolic (HFpEF), [] Systolic (HFrEF), []Combined (HFpEF and HFrEF), [] RHF, [] Pulm HTN, [] Other    [] SUSAN, [] ATN, [] AIN, [] other  [] CKD1, [] CKD2, [] CKD 3, [] CKD 4, [] CKD 5, []ESRD    Encephalopathy: [] Metabolic, [] Hepatic, [] toxic, [] Neurological, [] Other    Abnormal Nurtitional Status: [] malnurtition (see nutrition note), [ ]underweight: BMI < 19, [] morbid obesity: BMI >40, [] Cachexia    [] Sepsis  [] hypovolemic shock,[] cardiogenic shock, [] hemorrhagic shock, [] neuogenic shock  [] Acute Respiratory Failure  []Cerebral edema, [] Brain compression/ herniation,   [] Functional quadriplegia  [] Acute blood loss anemia

## 2023-10-23 NOTE — CONSULT NOTE ADULT - SUBJECTIVE AND OBJECTIVE BOX
Patient is a 57y old  Male who presents with a chief complaint of     HPI:  HPI: 57y Male with PMHx of HTN (noncompliant with meds) originally presented to [facility] ED c/o room spinning dizziness x 1 week worse with head movement. CTH with no acute intracranial injury. Microvascular disease. Chronic lacunar infarcts. CTA head and neck with mild dot-appearance to the bilateral ACAs and MCAs that may represent vasculitis. Short severe segmental narrowing of the left proximal A2 segment of the anterior cerebral artery. Severe focal narrowing of the left M2/M3 segment of the middle cerebral artery. Normal CTA of the extracranial circulation. No evidence of carotid stenosis. Was given total of 30mg labetalol and 30mg hydralazine with SBP still 230 and so cardene gtt was started. Given imaging findings, stroke was consulted. Upon initial examination, NIHSS 0. Was awaiting MICU bed for admission under the stroke service for a stroke workup.    A few mins later received call from patient's nurse that patient was now confused and was not speaking as much. BP at the time 130s. Upon my eval, NIHSS 2 for confusion/aphasia (unable to say where he is or the year) and hence stroke code was called. CT imaging unchanged. Exam slowly improving with SBP in the low 200s. Discussed case with Dr. Myles, since exam is improving and no change in imaging, likely symptoms are due to significant drop in BP.    T(C): 36.6 (10-21-23 @ 11:37), Max: 36.6 (10-21-23 @ 11:37)  HR: 95 (10-21-23 @ 18:20) (78 - 112)  BP: 233/138 (10-21-23 @ 18:20) (130/89 - 236/141)  RR: 20 (10-21-23 @ 18:20) (16 - 28)  SpO2: 98% (10-21-23 @ 18:20) (96% - 100%)    PAST MEDICAL & SURGICAL HISTORY:  HTN (hypertension)    FAMILY HISTORY:   (21 Oct 2023 19:58)    PAST MEDICAL & SURGICAL HISTORY:  HTN (hypertension)        MEDICATIONS  (STANDING):  atorvastatin 80 milliGRAM(s) Oral at bedtime  enoxaparin Injectable 40 milliGRAM(s) SubCutaneous every 24 hours  influenza   Vaccine 0.5 milliLiter(s) IntraMuscular once  labetalol 100 milliGRAM(s) Oral every 12 hours    MEDICATIONS  (PRN):          Home Living Status :  lives with his cousin in an elevator accessible apartment building ,  2 steps to enter          -  Prior Home Care Services :  none          Baseline Functional Level Prior to Admission :             - ADL's/ IADL's :  independent , requires partial assistance with          - Ambulatory status prior to admission :   walked with no assistive devices          FAMILY HISTORY:      CBC Full  -  ( 23 Oct 2023 05:30 )  WBC Count : 6.27 K/uL  RBC Count : 4.60 M/uL  Hemoglobin : 15.1 g/dL  Hematocrit : 43.3 %  Platelet Count - Automated : 233 K/uL  Mean Cell Volume : 94.1 fl  Mean Cell Hemoglobin : 32.8 pg  Mean Cell Hemoglobin Concentration : 34.9 gm/dL  Auto Neutrophil # : x  Auto Lymphocyte # : x  Auto Monocyte # : x  Auto Eosinophil # : x  Auto Basophil # : x  Auto Neutrophil % : x  Auto Lymphocyte % : x  Auto Monocyte % : x  Auto Eosinophil % : x  Auto Basophil % : x      10-23    139  |  105  |  17  ----------------------------<  100<H>  3.8   |  25  |  1.12    Ca    9.3      23 Oct 2023 05:30  Phos  3.5     10-23  Mg     2.1     10-23        Urinalysis Basic - ( 23 Oct 2023 05:30 )    Color: x / Appearance: x / SG: x / pH: x  Gluc: 100 mg/dL / Ketone: x  / Bili: x / Urobili: x   Blood: x / Protein: x / Nitrite: x   Leuk Esterase: x / RBC: x / WBC x   Sq Epi: x / Non Sq Epi: x / Bacteria: x        Radiology :     < from: CT Brain Stroke Protocol (10.21.23 @ 17:36) >  ACC: [id number] EXAM:  CT BRAIN STROKE PROTOCOL   ORDERED BY: JESSICA CHRISTENSEN     PROCEDURE DATE:  10/21/2023          INTERPRETATION:  Stroke code      Technique: CT head was performed utilizing axial images from the base of   the skull through the vertex without the administration of intravenous   contrast. Images were reviewed in the bone, brain and subdural windows.    Findings: Comparison is made to a prior CT of the brain performed on the   same day.    There is no evidence of acute intracranial hemorrhage or acute   transcortical infarct. There are several scattered foci of low density   seen within the white matter consistent with mild to moderate   microvascular disease, unchanged. There are tiny bilateral internal   capsule and right basal ganglia chronic lacunar infarcts. The ventricles   are normal in size and caliber.  There is no evidence of mass-effect or midline shift. There is no   evidence of an intra or extra-axial fluid collection.    Visualized paranasal sinuses andbilateral mastoid air cells are clear.    Impression: No acute intracranial injury. Microvascular disease. Chronic     < from: MR Head No Cont (10.22.23 @ 11:51) >  ACC: [id number] EXAM:  MR BRAIN   ORDERED BY: CARRIE OBANDO     PROCEDURE DATE:  10/22/2023          INTERPRETATION:  PROCEDURE: MRI Brain without contrast    INDICATION: CVA    TECHNIQUE: Sagittal T1 FLAIR , axial T1 FLAIR, T2 FLAIR, T2, diffusion,   SWI and coronal T2 weighted images of the brain were obtained.    COMPARISON: CT head 10/21/2023    FINDINGS: The MRI examination of the brain demonstrates the ventricles,   cisternal spaces, and cortical sulci to be appropriate for the patient's  stated age. There is no midline shift or extra-axial collection. The   diffusion-weighted images demonstrate punctate foci of hyperintense   signal with corresponding drop-off within the left frontal lobe within   the centrum semiovale (series 8 image 21) as well as within the right   periatrial white matter (series 8 images 17-18). The FLAIR/T2-weighted   images demonstrates predominantly confluent signal abnormality within the   periventricular white matter as well as within the pilo which is  nonspecific and may represent the sequela of small vessel ischemic   disease in this patient. There are multiple old lacunar infarcts within   the basal ganglia and corona radiata/centrum semiovale ovale bilaterally.   The SWI images demonstrates multiple scattered punctate foci of   microhemorrhages. There are normal vascular flow-voids.    There is mucosal thickening within the left maxillary sinus. The rest of   the visualized paranasal sinuses are free of mucosal disease. The mastoid   air cells are well-aerated.    IMPRESSION: Punctate foci of acute ischemia within the right periatrial   white matter and left centrum semiovale ovale.      Review of Systems : per HPI         Vital Signs Last 24 Hrs  T(C): 36.9 (23 Oct 2023 06:31), Max: 36.9 (23 Oct 2023 06:31)  T(F): 98.4 (23 Oct 2023 06:31), Max: 98.4 (23 Oct 2023 06:31)  HR: 78 (23 Oct 2023 07:00) (60 - 94)  BP: 159/105 (23 Oct 2023 07:00) (122/83 - 220/130)  BP(mean): 125 (23 Oct 2023 07:00) (99 - 167)  RR: 18 (23 Oct 2023 07:00) (12 - 37)  SpO2: 97% (23 Oct 2023 07:00) (91% - 98%)    Parameters below as of 23 Oct 2023 08:00  Patient On (Oxygen Delivery Method): room air            Physical Exam : in MICU , 57 y o man lying comfortably in semi Neff's position , awake , alert , no acute complaints     Head : normocephalic , atraumatic    Eyes : PERRLA , EOMI , no nystagmus , sclera anicteric    ENT / FACE : neg nasal discharge , uvula midline , no oropharyngeal erythema / exudate    Neck : supple , negative JVD , negative carotid bruits , no thyromegaly    Chest : CTA bilaterally , neg wheeze / rhonchi / rales / crackles / egophany    Cardiovascular : regular rate and rhythm , neg murmurs / rubs / gallops    Abdomen : soft , non distended , non tender to palpation in all 4 quadrants ,  normal bowel sounds     Extremities : WWP , neg cyanosis /clubbing / edema     Neurologic Exam :     Alert and oriented to person , place , date/year , speech fluent w/o dysarthria , follows commands , recent and remote memory intact , repetition intact , comprehension intact ,  attention/concentration intact , fund of knowledge appropriate    Cranial Nerves:           II :                         pupils equal , round and reactive to light , visual fields intact         III/ IV/VI :              extraocular movements intact , neg nystagmus , neg ptosis        V :                        facial sensation intact , V1-3 normal        VII :                      face symmetric , no droop , normal eye closure and smile        VIII :                     hearing intact to finger rub bilaterally        IX and X :             no hoarseness , gag intact , palate/ uvula rise symmetrically        XI :                       SCM / trapezius strength intact bilateral        XII :                      no tongue deviation       Motor Exam:                Right UE:                     no focal weakness ,  > 4/5 throughout  , no pronator drift       Left UE:                       no focal weakness ,  > 4/5 throughout  , no pronator drift           Right LE:          no focal weakness ,  > 4/5 throughout          Left LE:          no focal weakness ,  > 4/5 throughout           Sensation :         intact to light touch x 4 extremities                            no neglect or extinction on double simultaneous testing.    DTR :           biceps/brachioradialis : equal                            patella/ankle : equal           neg Babinski          Coordination :            Finger to Nose :  neg dysmetria bilaterally          Gait :  not tested          PM&R Impression : admitted for c/o dizziness    - MRI with punctate foci of acute ischemia within the right periatrial white matter and left centrum semiovale ovale      Recommendations / Plan :       1) Physical / Occupational therapy focusing on therapeutic exercises , equipment evaluation , bed mobility/transfer out of bed evaluation , progressive ambulation with assistive devices prn .    2) Current disposition plan recommendation  :   d/c home  , home PT/OT

## 2023-10-24 ENCOUNTER — TRANSCRIPTION ENCOUNTER (OUTPATIENT)
Age: 58
End: 2023-10-24

## 2023-10-24 LAB
% ALBUMIN: 54.6 % — SIGNIFICANT CHANGE UP
% ALBUMIN: 54.6 % — SIGNIFICANT CHANGE UP
% ALPHA 1: 3.7 % — SIGNIFICANT CHANGE UP
% ALPHA 1: 3.7 % — SIGNIFICANT CHANGE UP
% ALPHA 2: 9.1 % — SIGNIFICANT CHANGE UP
% ALPHA 2: 9.1 % — SIGNIFICANT CHANGE UP
% BETA: 12 % — SIGNIFICANT CHANGE UP
% BETA: 12 % — SIGNIFICANT CHANGE UP
% GAMMA: 20.6 % — SIGNIFICANT CHANGE UP
% GAMMA: 20.6 % — SIGNIFICANT CHANGE UP
ALBUMIN SERPL ELPH-MCNC: 4.1 G/DL — SIGNIFICANT CHANGE UP (ref 3.6–5.5)
ALBUMIN SERPL ELPH-MCNC: 4.1 G/DL — SIGNIFICANT CHANGE UP (ref 3.6–5.5)
ALBUMIN/GLOB SERPL ELPH: 1.2 RATIO — SIGNIFICANT CHANGE UP
ALBUMIN/GLOB SERPL ELPH: 1.2 RATIO — SIGNIFICANT CHANGE UP
ALPHA1 GLOB SERPL ELPH-MCNC: 0.3 G/DL — SIGNIFICANT CHANGE UP (ref 0.1–0.4)
ALPHA1 GLOB SERPL ELPH-MCNC: 0.3 G/DL — SIGNIFICANT CHANGE UP (ref 0.1–0.4)
ALPHA2 GLOB SERPL ELPH-MCNC: 0.7 G/DL — SIGNIFICANT CHANGE UP (ref 0.5–1)
ALPHA2 GLOB SERPL ELPH-MCNC: 0.7 G/DL — SIGNIFICANT CHANGE UP (ref 0.5–1)
ANION GAP SERPL CALC-SCNC: 13 MMOL/L — SIGNIFICANT CHANGE UP (ref 5–17)
ANION GAP SERPL CALC-SCNC: 13 MMOL/L — SIGNIFICANT CHANGE UP (ref 5–17)
ANTI-RIBONUCLEAR PROTEIN: 0.5 AI — SIGNIFICANT CHANGE UP
ANTI-RIBONUCLEAR PROTEIN: 0.5 AI — SIGNIFICANT CHANGE UP
AUTO DIFF PNL BLD: ABNORMAL
AUTO DIFF PNL BLD: ABNORMAL
B-GLOBULIN SERPL ELPH-MCNC: 0.9 G/DL — SIGNIFICANT CHANGE UP (ref 0.5–1)
B-GLOBULIN SERPL ELPH-MCNC: 0.9 G/DL — SIGNIFICANT CHANGE UP (ref 0.5–1)
BLD GP AB SCN SERPL QL: NEGATIVE — SIGNIFICANT CHANGE UP
BLD GP AB SCN SERPL QL: NEGATIVE — SIGNIFICANT CHANGE UP
BUN SERPL-MCNC: 16 MG/DL — SIGNIFICANT CHANGE UP (ref 7–23)
BUN SERPL-MCNC: 16 MG/DL — SIGNIFICANT CHANGE UP (ref 7–23)
C-ANCA SER-ACNC: NEGATIVE — SIGNIFICANT CHANGE UP
C-ANCA SER-ACNC: NEGATIVE — SIGNIFICANT CHANGE UP
CALCIUM SERPL-MCNC: 9.7 MG/DL — SIGNIFICANT CHANGE UP (ref 8.4–10.5)
CALCIUM SERPL-MCNC: 9.7 MG/DL — SIGNIFICANT CHANGE UP (ref 8.4–10.5)
CHLORIDE SERPL-SCNC: 105 MMOL/L — SIGNIFICANT CHANGE UP (ref 96–108)
CHLORIDE SERPL-SCNC: 105 MMOL/L — SIGNIFICANT CHANGE UP (ref 96–108)
CO2 SERPL-SCNC: 20 MMOL/L — LOW (ref 22–31)
CO2 SERPL-SCNC: 20 MMOL/L — LOW (ref 22–31)
CREAT SERPL-MCNC: 1 MG/DL — SIGNIFICANT CHANGE UP (ref 0.5–1.3)
CREAT SERPL-MCNC: 1 MG/DL — SIGNIFICANT CHANGE UP (ref 0.5–1.3)
DSDNA AB FLD-ACNC: <0.2 AI — SIGNIFICANT CHANGE UP
DSDNA AB FLD-ACNC: <0.2 AI — SIGNIFICANT CHANGE UP
DSDNA AB SER QL CLIF: NEGATIVE — SIGNIFICANT CHANGE UP
DSDNA AB SER QL CLIF: NEGATIVE — SIGNIFICANT CHANGE UP
EGFR: 87 ML/MIN/1.73M2 — SIGNIFICANT CHANGE UP
EGFR: 87 ML/MIN/1.73M2 — SIGNIFICANT CHANGE UP
ENA SM AB FLD QL: <0.2 AI — SIGNIFICANT CHANGE UP
ENA SM AB FLD QL: <0.2 AI — SIGNIFICANT CHANGE UP
ENA SS-A AB FLD IA-ACNC: <0.2 AI — SIGNIFICANT CHANGE UP
ENA SS-A AB FLD IA-ACNC: <0.2 AI — SIGNIFICANT CHANGE UP
GAMMA GLOBULIN: 1.5 G/DL — SIGNIFICANT CHANGE UP (ref 0.6–1.6)
GAMMA GLOBULIN: 1.5 G/DL — SIGNIFICANT CHANGE UP (ref 0.6–1.6)
GLUCOSE SERPL-MCNC: 108 MG/DL — HIGH (ref 70–99)
GLUCOSE SERPL-MCNC: 108 MG/DL — HIGH (ref 70–99)
HAV IGM SER-ACNC: SIGNIFICANT CHANGE UP
HAV IGM SER-ACNC: SIGNIFICANT CHANGE UP
HBV CORE IGM SER-ACNC: SIGNIFICANT CHANGE UP
HBV CORE IGM SER-ACNC: SIGNIFICANT CHANGE UP
HBV SURFACE AG SER-ACNC: SIGNIFICANT CHANGE UP
HBV SURFACE AG SER-ACNC: SIGNIFICANT CHANGE UP
HCT VFR BLD CALC: 46.6 % — SIGNIFICANT CHANGE UP (ref 39–50)
HCT VFR BLD CALC: 46.6 % — SIGNIFICANT CHANGE UP (ref 39–50)
HCV AB S/CO SERPL IA: 0.04 S/CO — SIGNIFICANT CHANGE UP
HCV AB S/CO SERPL IA: 0.04 S/CO — SIGNIFICANT CHANGE UP
HCV AB SERPL-IMP: SIGNIFICANT CHANGE UP
HCV AB SERPL-IMP: SIGNIFICANT CHANGE UP
HGB BLD-MCNC: 16 G/DL — SIGNIFICANT CHANGE UP (ref 13–17)
HGB BLD-MCNC: 16 G/DL — SIGNIFICANT CHANGE UP (ref 13–17)
HISTONE AB SER-ACNC: 0.5 UNITS — SIGNIFICANT CHANGE UP (ref 0–0.9)
HISTONE AB SER-ACNC: 0.5 UNITS — SIGNIFICANT CHANGE UP (ref 0–0.9)
INTERPRETATION SERPL IFE-IMP: SIGNIFICANT CHANGE UP
INTERPRETATION SERPL IFE-IMP: SIGNIFICANT CHANGE UP
MAGNESIUM SERPL-MCNC: 2 MG/DL — SIGNIFICANT CHANGE UP (ref 1.6–2.6)
MAGNESIUM SERPL-MCNC: 2 MG/DL — SIGNIFICANT CHANGE UP (ref 1.6–2.6)
MCHC RBC-ENTMCNC: 32.5 PG — SIGNIFICANT CHANGE UP (ref 27–34)
MCHC RBC-ENTMCNC: 32.5 PG — SIGNIFICANT CHANGE UP (ref 27–34)
MCHC RBC-ENTMCNC: 34.3 GM/DL — SIGNIFICANT CHANGE UP (ref 32–36)
MCHC RBC-ENTMCNC: 34.3 GM/DL — SIGNIFICANT CHANGE UP (ref 32–36)
MCV RBC AUTO: 94.7 FL — SIGNIFICANT CHANGE UP (ref 80–100)
MCV RBC AUTO: 94.7 FL — SIGNIFICANT CHANGE UP (ref 80–100)
NRBC # BLD: 0 /100 WBCS — SIGNIFICANT CHANGE UP (ref 0–0)
NRBC # BLD: 0 /100 WBCS — SIGNIFICANT CHANGE UP (ref 0–0)
P-ANCA SER-ACNC: NEGATIVE — SIGNIFICANT CHANGE UP
P-ANCA SER-ACNC: NEGATIVE — SIGNIFICANT CHANGE UP
PHOSPHATE SERPL-MCNC: 3.2 MG/DL — SIGNIFICANT CHANGE UP (ref 2.5–4.5)
PHOSPHATE SERPL-MCNC: 3.2 MG/DL — SIGNIFICANT CHANGE UP (ref 2.5–4.5)
PLATELET # BLD AUTO: 256 K/UL — SIGNIFICANT CHANGE UP (ref 150–400)
PLATELET # BLD AUTO: 256 K/UL — SIGNIFICANT CHANGE UP (ref 150–400)
POTASSIUM SERPL-MCNC: 3.8 MMOL/L — SIGNIFICANT CHANGE UP (ref 3.5–5.3)
POTASSIUM SERPL-MCNC: 3.8 MMOL/L — SIGNIFICANT CHANGE UP (ref 3.5–5.3)
POTASSIUM SERPL-SCNC: 3.8 MMOL/L — SIGNIFICANT CHANGE UP (ref 3.5–5.3)
POTASSIUM SERPL-SCNC: 3.8 MMOL/L — SIGNIFICANT CHANGE UP (ref 3.5–5.3)
PROT PATTERN SERPL ELPH-IMP: SIGNIFICANT CHANGE UP
PROT PATTERN SERPL ELPH-IMP: SIGNIFICANT CHANGE UP
RBC # BLD: 4.92 M/UL — SIGNIFICANT CHANGE UP (ref 4.2–5.8)
RBC # BLD: 4.92 M/UL — SIGNIFICANT CHANGE UP (ref 4.2–5.8)
RBC # FLD: 12.5 % — SIGNIFICANT CHANGE UP (ref 10.3–14.5)
RBC # FLD: 12.5 % — SIGNIFICANT CHANGE UP (ref 10.3–14.5)
RH IG SCN BLD-IMP: POSITIVE — SIGNIFICANT CHANGE UP
RH IG SCN BLD-IMP: POSITIVE — SIGNIFICANT CHANGE UP
SODIUM SERPL-SCNC: 138 MMOL/L — SIGNIFICANT CHANGE UP (ref 135–145)
SODIUM SERPL-SCNC: 138 MMOL/L — SIGNIFICANT CHANGE UP (ref 135–145)
VZV IGM SER-ACNC: <0.91 INDEX — SIGNIFICANT CHANGE UP (ref 0–0.9)
VZV IGM SER-ACNC: <0.91 INDEX — SIGNIFICANT CHANGE UP (ref 0–0.9)
WBC # BLD: 7.69 K/UL — SIGNIFICANT CHANGE UP (ref 3.8–10.5)
WBC # BLD: 7.69 K/UL — SIGNIFICANT CHANGE UP (ref 3.8–10.5)
WBC # FLD AUTO: 7.69 K/UL — SIGNIFICANT CHANGE UP (ref 3.8–10.5)
WBC # FLD AUTO: 7.69 K/UL — SIGNIFICANT CHANGE UP (ref 3.8–10.5)

## 2023-10-24 PROCEDURE — 36225 PLACE CATH SUBCLAVIAN ART: CPT | Mod: LT,59

## 2023-10-24 PROCEDURE — 36224 PLACE CATH CAROTD ART: CPT | Mod: 50

## 2023-10-24 PROCEDURE — 99233 SBSQ HOSP IP/OBS HIGH 50: CPT

## 2023-10-24 PROCEDURE — 99232 SBSQ HOSP IP/OBS MODERATE 35: CPT

## 2023-10-24 PROCEDURE — 36226 PLACE CATH VERTEBRAL ART: CPT | Mod: RT

## 2023-10-24 RX ORDER — LABETALOL HCL 100 MG
10 TABLET ORAL ONCE
Refills: 0 | Status: COMPLETED | OUTPATIENT
Start: 2023-10-24 | End: 2023-10-24

## 2023-10-24 RX ORDER — LISINOPRIL 2.5 MG/1
5 TABLET ORAL ONCE
Refills: 0 | Status: COMPLETED | OUTPATIENT
Start: 2023-10-24 | End: 2023-10-24

## 2023-10-24 RX ORDER — LISINOPRIL 2.5 MG/1
10 TABLET ORAL DAILY
Refills: 0 | Status: DISCONTINUED | OUTPATIENT
Start: 2023-10-25 | End: 2023-10-25

## 2023-10-24 RX ADMIN — Medication 100 MILLIGRAM(S): at 21:41

## 2023-10-24 RX ADMIN — Medication 12.5 MILLIGRAM(S): at 21:41

## 2023-10-24 RX ADMIN — Medication 10 MILLIGRAM(S): at 15:11

## 2023-10-24 RX ADMIN — Medication 100 MILLIGRAM(S): at 14:06

## 2023-10-24 RX ADMIN — Medication 12.5 MILLIGRAM(S): at 14:06

## 2023-10-24 RX ADMIN — ATORVASTATIN CALCIUM 80 MILLIGRAM(S): 80 TABLET, FILM COATED ORAL at 21:41

## 2023-10-24 RX ADMIN — Medication 10 MILLIGRAM(S): at 19:12

## 2023-10-24 RX ADMIN — LISINOPRIL 5 MILLIGRAM(S): 2.5 TABLET ORAL at 03:41

## 2023-10-24 RX ADMIN — LISINOPRIL 5 MILLIGRAM(S): 2.5 TABLET ORAL at 19:12

## 2023-10-24 NOTE — BRIEF OPERATIVE NOTE - OPERATION/FINDINGS
Patient was brought to neuroangiography suite, was placed on standard anesthesia monitors, sedated, and under MAC, using a 5 fr sheath right CFA, cerebral angiogram was performed.    Findings: No aneurysm, AVM, or early venous shunting.  There are diffused vessel irregularities throughout suspicious for intra cranial atherosclerotic disease (ICAD)  Full report to follow.  Patient tolerated procedure well, no new neurological deficit, hemodynamically stable.  Right groin/vasc access site: 5 fr vascade and manual compression applied, hemostasis achieved, no hematoma.  Patient was transferred to Cath lab recovery area.  Above d/w: Dr. Lara

## 2023-10-24 NOTE — PROVIDER CONTACT NOTE (CHANGE IN STATUS NOTIFICATION) - ACTION/TREATMENT ORDERED:
provided missed dose of labetalol p.o.
Labetalol 10 mg IVP given. Patient can continue to transfer to 5L.

## 2023-10-24 NOTE — PROGRESS NOTE ADULT - SUBJECTIVE AND OBJECTIVE BOX
Neurology Stroke Progress Note    INTERVAL HPI/OVERNIGHT EVENTS:  Patient seen and examined. S/p cerebral angio 10/24.     MEDICATIONS  (STANDING):  atorvastatin 80 milliGRAM(s) Oral at bedtime  enoxaparin Injectable 40 milliGRAM(s) SubCutaneous every 24 hours  influenza   Vaccine 0.5 milliLiter(s) IntraMuscular once  labetalol 100 milliGRAM(s) Oral every 12 hours  lisinopril 5 milliGRAM(s) Oral daily  meclizine 12.5 milliGRAM(s) Oral every 8 hours  niCARdipine Infusion 5 mG/Hr (25 mL/Hr) IV Continuous <Continuous>    MEDICATIONS  (PRN):      Allergies    No Known Allergies    Intolerances        Vital Signs Last 24 Hrs  T(C): 36.6 (24 Oct 2023 08:23), Max: 37 (23 Oct 2023 22:13)  T(F): 97.9 (24 Oct 2023 06:30), Max: 98.6 (23 Oct 2023 22:13)  HR: 82 (24 Oct 2023 08:23) (66 - 117)  BP: 151/97 (24 Oct 2023 08:23) (134/92 - 207/123)  BP(mean): 117 (24 Oct 2023 08:23) (106 - 158)  RR: 18 (24 Oct 2023 08:23) (12 - 29)  SpO2: 94% (24 Oct 2023 08:23) (92% - 98%)    Parameters below as of 24 Oct 2023 07:00  Patient On (Oxygen Delivery Method): room air        Physical exam:  General: No acute distress, awake and alert  Eyes: Anicteric sclerae, moist conjunctivae, see below for CNs  Neck: trachea midline, FROM, supple, no thyromegaly or lymphadenopathy  Pulmonary: No use of accessory muscles  Extremities:  no edema    Neurologic:  *****    LABS:                        16.0   7.69  )-----------( 256      ( 24 Oct 2023 06:23 )             46.6     10-24    138  |  105  |  16  ----------------------------<  108<H>  3.8   |  20<L>  |  1.00    Ca    9.7      24 Oct 2023 06:23  Phos  3.2     10-24  Mg     2.0     10-24        Urinalysis Basic - ( 24 Oct 2023 06:23 )    Color: x / Appearance: x / SG: x / pH: x  Gluc: 108 mg/dL / Ketone: x  / Bili: x / Urobili: x   Blood: x / Protein: x / Nitrite: x   Leuk Esterase: x / RBC: x / WBC x   Sq Epi: x / Non Sq Epi: x / Bacteria: x        RADIOLOGY & ADDITIONAL TESTS:     Neurology Stroke Progress Note/Tranfer note from ICU to stroke telemetry     Hospital Course:  57y Male with PMHx of HTN (noncompliant with meds) originally presented to [facility] ED c/o room spinning dizziness x 1 week worse with head movement. CTH with no acute intracranial injury. Microvascular disease. Chronic lacunar infarcts. CTA head and neck with mild dot-appearance to the bilateral ACAs and MCAs that may represent vasculitis. Short severe segmental narrowing of the left proximal A2 segment of the anterior cerebral artery. Severe focal narrowing of the left M2/M3 segment of the middle cerebral artery. Normal CTA of the extracranial circulation. No evidence of carotid stenosis. Was given total of 30mg labetalol and 30mg hydralazine with SBP still 230s and so cardene gtt was started. Given imaging findings, stroke was consulted. Upon initial examination, NIHSS 0. Was awaiting MICU bed for admission under the stroke service for a stroke workup. Patient then became confused/aphasic in setting of SBP 130s. Stroke code was called. CT imaging unchanged. Exam slowly improving with SBP in the low 200s. Discussed case with Dr. Myles, since exam is improving and no change in imaging, likely symptoms are due to significant drop in BP. Admitted to MICU for stroke w/u. MRI brain punctate right periatrial white matter and left centrum semiovale, scattered punctate foci of microhemorrhages. MRA head with ICAD in L>R clinoid segments of ICA, left A2 severe narrowing, moderate left MCA post bifurcation narrowing. With difficult to control BP, patient upgraded to ICU for cardene gtt. S/p cerebral angio 10/24 with multifocal ICAD. Stepped down to stroke tele.     INTERVAL HPI/OVERNIGHT EVENTS:  Patient seen and examined. S/p cerebral angio 10/24.     MEDICATIONS  (STANDING):  atorvastatin 80 milliGRAM(s) Oral at bedtime  enoxaparin Injectable 40 milliGRAM(s) SubCutaneous every 24 hours  influenza   Vaccine 0.5 milliLiter(s) IntraMuscular once  labetalol 100 milliGRAM(s) Oral every 12 hours  lisinopril 5 milliGRAM(s) Oral daily  meclizine 12.5 milliGRAM(s) Oral every 8 hours  niCARdipine Infusion 5 mG/Hr (25 mL/Hr) IV Continuous <Continuous>    MEDICATIONS  (PRN):      Allergies    No Known Allergies    Intolerances        Vital Signs Last 24 Hrs  T(C): 36.6 (24 Oct 2023 08:23), Max: 37 (23 Oct 2023 22:13)  T(F): 97.9 (24 Oct 2023 06:30), Max: 98.6 (23 Oct 2023 22:13)  HR: 82 (24 Oct 2023 08:23) (66 - 117)  BP: 151/97 (24 Oct 2023 08:23) (134/92 - 207/123)  BP(mean): 117 (24 Oct 2023 08:23) (106 - 158)  RR: 18 (24 Oct 2023 08:23) (12 - 29)  SpO2: 94% (24 Oct 2023 08:23) (92% - 98%)    Parameters below as of 24 Oct 2023 07:00  Patient On (Oxygen Delivery Method): room air        Physical exam:  General: No acute distress, awake and alert  Eyes: Anicteric sclerae, moist conjunctivae, see below for CNs  Neck: trachea midline, FROM, supple, no thyromegaly or lymphadenopathy  Pulmonary: No use of accessory muscles  Extremities:  no edema    Neurologic:  *****    LABS:                        16.0   7.69  )-----------( 256      ( 24 Oct 2023 06:23 )             46.6     10-24    138  |  105  |  16  ----------------------------<  108<H>  3.8   |  20<L>  |  1.00    Ca    9.7      24 Oct 2023 06:23  Phos  3.2     10-24  Mg     2.0     10-24        Urinalysis Basic - ( 24 Oct 2023 06:23 )    Color: x / Appearance: x / SG: x / pH: x  Gluc: 108 mg/dL / Ketone: x  / Bili: x / Urobili: x   Blood: x / Protein: x / Nitrite: x   Leuk Esterase: x / RBC: x / WBC x   Sq Epi: x / Non Sq Epi: x / Bacteria: x        RADIOLOGY & ADDITIONAL TESTS:     Neurology Stroke Progress Note/Tranfer note from ICU to stroke telemetry    #[id number]    Hospital Course:  57y Male with PMHx of HTN (noncompliant with meds) originally presented to [facility] ED c/o room spinning dizziness x 1 week worse with head movement. CTH with no acute intracranial injury. Microvascular disease. Chronic lacunar infarcts. CTA head and neck with mild dot-appearance to the bilateral ACAs and MCAs that may represent vasculitis. Short severe segmental narrowing of the left proximal A2 segment of the anterior cerebral artery. Severe focal narrowing of the left M2/M3 segment of the middle cerebral artery. Normal CTA of the extracranial circulation. No evidence of carotid stenosis. Was given total of 30mg labetalol and 30mg hydralazine with SBP still 230s and so cardene gtt was started. Given imaging findings, stroke was consulted. Upon initial examination, NIHSS 0. Was awaiting MICU bed for admission under the stroke service for a stroke workup. Patient then became confused/aphasic in setting of SBP 130s. Stroke code was called. CT imaging unchanged. Exam slowly improving with SBP in the low 200s. Discussed case with Dr. Myles, since exam is improving and no change in imaging, likely symptoms are due to significant drop in BP. Admitted to MICU for stroke w/u. MRI brain punctate right periatrial white matter and left centrum semiovale, scattered punctate foci of microhemorrhages. MRA head with ICAD in L>R clinoid segments of ICA, left A2 severe narrowing, moderate left MCA post bifurcation narrowing. With difficult to control BP, patient upgraded to ICU for cardene gtt. S/p cerebral angio 10/24 with multifocal ICAD. Stepped down to stroke tele.     INTERVAL HPI/OVERNIGHT EVENTS:  Patient seen and examined. S/p cerebral angio 10/24.     MEDICATIONS  (STANDING):  atorvastatin 80 milliGRAM(s) Oral at bedtime  enoxaparin Injectable 40 milliGRAM(s) SubCutaneous every 24 hours  influenza   Vaccine 0.5 milliLiter(s) IntraMuscular once  labetalol 100 milliGRAM(s) Oral every 12 hours  lisinopril 5 milliGRAM(s) Oral daily  meclizine 12.5 milliGRAM(s) Oral every 8 hours  niCARdipine Infusion 5 mG/Hr (25 mL/Hr) IV Continuous <Continuous>    MEDICATIONS  (PRN):      Allergies    No Known Allergies    Intolerances        Vital Signs Last 24 Hrs  T(C): 36.6 (24 Oct 2023 08:23), Max: 37 (23 Oct 2023 22:13)  T(F): 97.9 (24 Oct 2023 06:30), Max: 98.6 (23 Oct 2023 22:13)  HR: 82 (24 Oct 2023 08:23) (66 - 117)  BP: 151/97 (24 Oct 2023 08:23) (134/92 - 207/123)  BP(mean): 117 (24 Oct 2023 08:23) (106 - 158)  RR: 18 (24 Oct 2023 08:23) (12 - 29)  SpO2: 94% (24 Oct 2023 08:23) (92% - 98%)    Parameters below as of 24 Oct 2023 07:00  Patient On (Oxygen Delivery Method): room air        Physical exam:  General: No acute distress, awake and alert  Eyes: Anicteric sclerae, moist conjunctivae, see below for CNs  Neck: trachea midline, FROM, supple, no thyromegaly or lymphadenopathy  Pulmonary: No use of accessory muscles  Extremities:  no edema    Neurologic:  -Mental status: Awake, alert, oriented to person, place, and time. Speech is fluent, with intact naming, repetition, and comprehension, no dysarthria. Follows commands. Attention/concentration intact.  -Cranial nerves:   II: Visual fields are full to confrontation.  III, IV, VI: EOMs intact, no nystagmus appreciated. Pupils equally round and reactive to light  V:  Facial sensation V1-V3 equal and intact   VII: Face is symmetric with normal eye closure and smile  VIII: Hearing is bilaterally intact  XII: Tongue protrudes midline  Motor: Normal bulk and tone. No pronator drift. Strength bilateral upper extremity 5/5, bilateral lower extremities 5/5.  Sensation: Intact to light touch bilaterally. No neglect or extinction on double simultaneous testing.  Coordination: No dysmetria on finger-to-nose bilaterally      LABS:                        16.0   7.69  )-----------( 256      ( 24 Oct 2023 06:23 )             46.6     10-24    138  |  105  |  16  ----------------------------<  108<H>  3.8   |  20<L>  |  1.00    Ca    9.7      24 Oct 2023 06:23  Phos  3.2     10-24  Mg     2.0     10-24        Urinalysis Basic - ( 24 Oct 2023 06:23 )    Color: x / Appearance: x / SG: x / pH: x  Gluc: 108 mg/dL / Ketone: x  / Bili: x / Urobili: x   Blood: x / Protein: x / Nitrite: x   Leuk Esterase: x / RBC: x / WBC x   Sq Epi: x / Non Sq Epi: x / Bacteria: x        RADIOLOGY & ADDITIONAL TESTS:

## 2023-10-24 NOTE — PROGRESS NOTE ADULT - ASSESSMENT
57y Male with PMHx of HTN (noncompliant with meds) originally presented to [facility] ED c/o room spinning dizziness x 1 week worse with head movement. CTA imaging with dot-appearance along b/l EVERARDO and MCA suspicious for vasculitis. C/c/b hypertension requiring cardenge gtt and AMDS with aphasia after significant drop in BP. MRI with right periatrial white matteral and left centrum semiovale infarct and scattered microhemorrhages. Etiology of infarcts 2/2 ICAD vs vasculitis. Pending diagnostic angio 10/24    Neuro  #CVA workup  - d/c aspirin and plavix in anticipation of angiogram with possible LP pending angio results   - continue atorvastatin 80mg daily  - q1hr stroke neuro checks and vitals  - MRI with punctate foci of acute ischemia within the right periatrial white matter and left centrum semiovale ovale. The SWI images demonstrates multiple scattered punctate foci of   micro hemorrhages. Flair reviewed by Dr. Myles, possible vasculitic lesions.   - MRA head -  ICAD in L>R clinoid segments of ICA, left A2 severe narrowing, moderate left MCA post bifurcation narrowing.    - Stroke Code HCT Results: No acute intracranial injury. Microvascular disease. Chronic lacunar infarcts.  - Stroke Code CTA Results: Mild dot-appearance to the bilateral ACAs and MCAs that may represent vasculitis. Short severe segmental narrowing of the left proximal A2 segment of the anterior cerebral artery, unchanged. Severe focal narrowing of the left M2/M3 segment of the middle cerebral artery, unchanged.  - Stroke education  - rheumatology consulted, appreciate recs: hepatitis panel neg, pending cyclic citrullinated peptide AB.   - vasculitis panel ordered, thus far RF +, EBV titers c/w prior infection   - neurosurgery consulted for DSA, plan for Tuesday 10/24  - LP +/- dependent on DSA findings     #BPPV - pt endorses dizzines upon positional changes  - c/w meclizine 12.5mg TID, may uptitrate   - obtain orthostatics     Cards  #HTN  Does not take BP meds at home  - SBP goal 160-180, can give PRN pushes if above 180  - continue labetalol 100mg BID  - c/w lisinopril 5mg daily   - TTE with bubble - EF 70-75%, no PFO    #HLD  - high dose statin as above in CVA  - LDL results: 156    Pulm  - call provider if SPO2 < 94%    GI  #Nutrition/Fluids/Electrolytes   - replete K<4 and Mg <2  - Diet: reg diet  - IVF: n/a    Endocrine  - A1C results: 5.7  - TSH results: 5.090  - free T4 1.200, T3 105    DVT Prophylaxis  - lovenox sq for DVT prophylaxis   - SCDs for DVT prophylaxis     Dispo: home PT/OT      Discussed daily hospital plans and goals with patient.     Discussed with Neurology Attending Dr. Romero      57y Male with PMHx of HTN (noncompliant with meds) originally presented to [facility] ED c/o room spinning dizziness x 1 week worse with head movement. CTA imaging with dot-appearance along b/l EVERARDO and MCA suspicious for vasculitis. C/c/b hypertension requiring cardenge gtt and AMDS with aphasia after significant drop in BP. MRI with right periatrial white matteral and left centrum semiovale infarct and scattered microhemorrhages. Etiology of infarcts 2/2 ICAD vs vasculitis. Pending diagnostic angio 10/24    Neuro  #CVA workup  - c/w DAPT x 90 days for ICAD (will restart 24 hours post angio)  - continue atorvastatin 80mg daily  - q1hr stroke neuro checks and vitals  - MRI with punctate foci of acute ischemia within the right periatrial white matter and left centrum semiovale ovale. The SWI images demonstrates multiple scattered punctate foci of   micro hemorrhages. Flair reviewed by Dr. Myles, possible vasculitic lesions.   - MRA head -  ICAD in L>R clinoid segments of ICA, left A2 severe narrowing, moderate left MCA post bifurcation narrowing.    - Stroke Code HCT Results: No acute intracranial injury. Microvascular disease. Chronic lacunar infarcts.  - Stroke Code CTA Results: Mild dot-appearance to the bilateral ACAs and MCAs that may represent vasculitis. Short severe segmental narrowing of the left proximal A2 segment of the anterior cerebral artery, unchanged. Severe focal narrowing of the left M2/M3 segment of the middle cerebral artery, unchanged.  - Stroke education  - rheumatology consulted, appreciate recs: hepatitis panel neg, pending cyclic citrullinated peptide AB.   - vasculitis panel ordered, thus far RF +, EBV titers c/w prior infection   - s/p DOUGLAS 10/24: multifocal ICAD, no evidence of vasculitis     #BPPV - pt endorses dizzines upon positional changes  - c/w meclizine 12.5mg TID, may uptitrate   - obtain orthostatics     Cards  #HTN  Does not take BP meds at home  - SBP goal 160-180, can give PRN pushes if above 180  - continue labetalol 100mg BID  - c/w lisinopril 5mg daily   - TTE with bubble - EF 70-75%, no PFO  - JONNY, [facility] outpatient     #HLD  - high dose statin as above in CVA  - LDL results: 156    Pulm  - call provider if SPO2 < 94%    GI  #Nutrition/Fluids/Electrolytes   - replete K<4 and Mg <2  - Diet: reg diet  - IVF: n/a    Endocrine  - A1C results: 5.7  - TSH results: 5.090  - free T4 1.200, T3 105    DVT Prophylaxis  - lovenox sq for DVT prophylaxis (restart 24 hours post angio)  - SCDs for DVT prophylaxis     Dispo: home PT/OT      Discussed daily hospital plans and goals with patient.     Discussed with Neurology Attending Dr. Romero

## 2023-10-24 NOTE — PROVIDER CONTACT NOTE (CHANGE IN STATUS NOTIFICATION) - DATE AND TIME:
.Pt received testosterone inj. Tolerated well. Pt instructed to wait 15mins to be assessed for adverse reactions. verbalized understanding.    
24-Oct-2023 15:27
24-Oct-2023 14:00

## 2023-10-24 NOTE — PRE-ANESTHESIA EVALUATION ADULT - NSANTHOSAYNRD_GEN_A_CORE
No. AGUILAR screening performed.  STOP BANG Legend: 0-2 = LOW Risk; 3-4 = INTERMEDIATE Risk; 5-8 = HIGH Risk

## 2023-10-24 NOTE — PROGRESS NOTE ADULT - SUBJECTIVE AND OBJECTIVE BOX
NEUROSURGERY POST OP NOTE:    POD# 0 S/P diagnostic cerebral angiogram    S: Patient states he is doing well. No pain at the groin site. Denies headache, dizziness, numbness, tingling, temperature differences      T(C): 36.6 (10-24-23 @ 08:23), Max: 37 (10-23-23 @ 22:13)  HR: 82 (10-24-23 @ 08:23) (66 - 117)  BP: 151/97 (10-24-23 @ 08:23) (134/92 - 207/123)  RR: 18 (10-24-23 @ 08:23) (12 - 29)  SpO2: 94% (10-24-23 @ 08:23) (92% - 97%)      10-23-23 @ 07:01  -  10-24-23 @ 07:00  --------------------------------------------------------  IN: 832.5 mL / OUT: 501 mL / NET: 331.5 mL        atorvastatin 80 milliGRAM(s) Oral at bedtime  enoxaparin Injectable 40 milliGRAM(s) SubCutaneous every 24 hours  influenza   Vaccine 0.5 milliLiter(s) IntraMuscular once  labetalol 100 milliGRAM(s) Oral every 12 hours  lisinopril 5 milliGRAM(s) Oral daily  meclizine 12.5 milliGRAM(s) Oral every 8 hours  niCARdipine Infusion 5 mG/Hr IV Continuous <Continuous>      RADIOLOGY:     Exam:  General: NAD, cooperative, conversant, laying in bed supine   Neuro: A&O x3, CN II-XII intact, motor 5/5 UE b/l except 4/5 left hand , LLE 5/5, DF/PF 5/5 b/l,  sensation intact to light though throughout   Cardio: RRR  Respiratory: No use of accessory muscles, symmetrical chest rise   GI: soft, nontender, +BS  Extremities: warm/dry b/l, DP/PT 2+ b/l        WOUND/DRAINS: Right groin access site, C/D/I    DEVICES:       Assessment: 57yMale      Plan:      Assessment:  Present when checked    []  GCS  E   V  M     Heart Failure: []Acute, [] acute on chronic , []chronic  Heart Failure:  [] Diastolic (HFpEF), [] Systolic (HFrEF), []Combined (HFpEF and HFrEF), [] RHF, [] Pulm HTN, [] Other    [] SUSAN, [] ATN, [] AIN, [] other  [] CKD1, [] CKD2, [] CKD 3, [] CKD 4, [] CKD 5, []ESRD    Encephalopathy: [] Metabolic, [] Hepatic, [] toxic, [] Neurological, [] Other    Abnormal Nurtitional Status: [] malnurtition (see nutrition note), [ ]underweight: BMI < 19, [] morbid obesity: BMI >40, [] Cachexia    [] Sepsis  [] hypovolemic shock,[] cardiogenic shock, [] hemorrhagic shock, [] neuogenic shock  [] Acute Respiratory Failure  []Cerebral edema, [] Brain compression/ herniation,   [] Functional quadriplegia  [] Acute blood loss anemia       NEUROSURGERY POST OP NOTE:    POD# 0 S/P diagnostic cerebral angiogram    S: Patient states he is doing well. No pain at the groin site. Denies headache, dizziness, numbness, tingling, temperature differences      T(C): 36.6 (10-24-23 @ 08:23), Max: 37 (10-23-23 @ 22:13)  HR: 82 (10-24-23 @ 08:23) (66 - 117)  BP: 151/97 (10-24-23 @ 08:23) (134/92 - 207/123)  RR: 18 (10-24-23 @ 08:23) (12 - 29)  SpO2: 94% (10-24-23 @ 08:23) (92% - 97%)      10-23-23 @ 07:01  -  10-24-23 @ 07:00  --------------------------------------------------------  IN: 832.5 mL / OUT: 501 mL / NET: 331.5 mL        atorvastatin 80 milliGRAM(s) Oral at bedtime  enoxaparin Injectable 40 milliGRAM(s) SubCutaneous every 24 hours  influenza   Vaccine 0.5 milliLiter(s) IntraMuscular once  labetalol 100 milliGRAM(s) Oral every 12 hours  lisinopril 5 milliGRAM(s) Oral daily  meclizine 12.5 milliGRAM(s) Oral every 8 hours  niCARdipine Infusion 5 mG/Hr IV Continuous <Continuous>      RADIOLOGY:     Exam:  General: NAD, cooperative, conversant, laying in bed supine   Neuro: A&O x3, CN II-XII intact, motor 5/5 UE b/l except 4/5 left hand , LLE 5/5, DF/PF 5/5 b/l,  sensation intact to light though throughout   Cardio: RRR  Respiratory: No use of accessory muscles, symmetrical chest rise   GI: soft, nontender, +BS  Extremities: warm/dry b/l, no edema, no discoloration DP/PT 2+ b/l    WOUND/DRAINS: Right groin access site, C/D/I, no sign of hematoma     DEVICES:       Assessment: 57yMale PMHx of HTN, CVA, presented to [facility] ED c/o room spinning dizziness x 1 week worse with head movement. Angiogram through right CFA showed diffuse vessel irregularities throughout suggestive of ICAD.     Plan:  Flat x 2 hours, then up to 15 degrees x 2 hours, then as tolerated     Assessment:  Present when checked    []  GCS  E   V  M     Heart Failure: []Acute, [] acute on chronic , []chronic  Heart Failure:  [] Diastolic (HFpEF), [] Systolic (HFrEF), []Combined (HFpEF and HFrEF), [] RHF, [] Pulm HTN, [] Other    [] SUSAN, [] ATN, [] AIN, [] other  [] CKD1, [] CKD2, [] CKD 3, [] CKD 4, [] CKD 5, []ESRD    Encephalopathy: [] Metabolic, [] Hepatic, [] toxic, [] Neurological, [] Other    Abnormal Nurtitional Status: [] malnurtition (see nutrition note), [ ]underweight: BMI < 19, [] morbid obesity: BMI >40, [] Cachexia    [] Sepsis  [] hypovolemic shock,[] cardiogenic shock, [] hemorrhagic shock, [] neuogenic shock  [] Acute Respiratory Failure  []Cerebral edema, [] Brain compression/ herniation,   [] Functional quadriplegia  [] Acute blood loss anemia       NEUROSURGERY POST OP NOTE:    POD# 0 S/P diagnostic cerebral angiogram    S: Patient states he is doing well. No pain at the groin site. Denies headache, dizziness, numbness, tingling, or poikilothermia     T(C): 36.6 (10-24-23 @ 08:23), Max: 37 (10-23-23 @ 22:13)  HR: 82 (10-24-23 @ 08:23) (66 - 117)  BP: 151/97 (10-24-23 @ 08:23) (134/92 - 207/123)  RR: 18 (10-24-23 @ 08:23) (12 - 29)  SpO2: 94% (10-24-23 @ 08:23) (92% - 97%)    10-23-23 @ 07:01  -  10-24-23 @ 07:00  --------------------------------------------------------  IN: 832.5 mL / OUT: 501 mL / NET: 331.5 mL    atorvastatin 80 milliGRAM(s) Oral at bedtime  enoxaparin Injectable 40 milliGRAM(s) SubCutaneous every 24 hours  influenza   Vaccine 0.5 milliLiter(s) IntraMuscular once  labetalol 100 milliGRAM(s) Oral every 12 hours  lisinopril 5 milliGRAM(s) Oral daily  meclizine 12.5 milliGRAM(s) Oral every 8 hours  niCARdipine Infusion 5 mG/Hr IV Continuous <Continuous>    RADIOLOGY:     < from: CT Angio Brain Stroke Protocol  w/ IV Cont (10.21.23 @ 17:37) >  Impression:    Mild dot-appearance to the bilateral ACAs and MCAs that may represent   vasculitis    Short severe segmental narrowing of the left proximal A2 segment of the   anterior cerebral artery, unchanged.    Severe focal narrowing of the left M2/M3 segment of the middle cerebral   artery, unchanged.    Normal CTA of the extracranial circulation.    < end of copied text >    Exam:  General: NAD, cooperative, conversant, laying in bed supine   Neuro: A&O x3, CN II-XII intact, motor 5/5 UE b/l except 4/5 left hand  (pain limited), LLE 5/5, DF/PF 5/5 b/l,  sensation intact to light though throughout   Cardio: RRR  Respiratory: No use of accessory muscles, symmetrical chest rise   GI: soft, nontender, +BS  : Deferred   Extremities: warm/dry b/l, no edema, no discoloration DP/PT 2+ b/l    Skin: L dorsal wrist well demarcated, hypopigmented scar limiting mobility   WOUND/DRAINS: Right groin access site, C/D/I, no sign of hematoma     Assessment:   58 y/o male h/o HTN, CVA presented to [facility] ED c/o room spinning dizziness x 1 week worse with head movement. Neurosurgery consulted to r/o vasculitis. s/p angiogram (10/24/23), demonstrates diffuse vessel irregularities throughout suggestive of ICAD.     Plan:  - Flat x 2 hours, then up to 15 degrees x 2 hours, then as tolerated   - Neurovascular checks as written   - Will remove groin dressing in the morning  - Please reach out for questions/concerns [phone]    D/W Dr. Lara

## 2023-10-24 NOTE — PROGRESS NOTE ADULT - NS ATTEND AMEND GEN_ALL_CORE FT
57 year old man w/ PMH of HTN (non compliant) presented w/ intermittent roomspinning dizziness. On exam, patient w/ unidirectional R. beating nystagmus on horizontal and vertical gaze, otherwise normal.     CTH negative  CTA h/n demonstrated multifocal ICAD, including severe stenosis of the L. A2, severe stenosis of the L. M2/M3, L > R. supraclinoid ICA stenosis.   CTP demonstrates Tmax >6s delay of 53cc in both hemispheres  MR brain demonstrates punctate infarction involving the R. white matter and L. centrum semiovale. Multiple chronic lacunar infarctions; Multiple +SWI microhemorrhages involving both cerebellar hemispheres, and bihemispheric.   A1c 5.7     ESR wnl; CRP wnl   RF 20 (mildly elevated)   Utox negative  TTE EF wnl; Moderate LVH; Normal LA; No PFO.   DSA +multifocal ICAD but without classic beading appearance that would suggest vasculitis.     Impression:   1. Intermittent vertigo exacerbated by head movements w/ unidirectional right-beating nystagmus -- etiology likely secondary to BPPV.   2. Incidental bilateral cerebral infarction in setting of multifocal intracranial stenosis -- mechanism of stroke likely intracranial large artery atherosclerotic disease. Less likely vasculitis given DSA findings and clinical history.     Plan:   Holding DAPT for now given possible LP; If deemed to be ICAD, will tx w/ DAPT for 90d per [facility]IS  SBP goal 140-180; Gradual normotension beginning tomorrow  JONNY  [facility]  Lovenox SQ for DVT ppx     45 minutes spent on total encounter. The necessity of the time spent during the encounter on this date of service was due to:     Review of imaging and chart; obtaining history; examination of pt; discussion and coordination of care, and discussion of lifestyle modification and risk factor control. 57 year old man w/ PMH of HTN (non compliant) presented w/ intermittent roomspinning dizziness. On exam, patient w/ unidirectional R. beating nystagmus on horizontal and vertical gaze, otherwise normal.     CTH negative  CTA h/n demonstrated multifocal ICAD, including severe stenosis of the L. A2, severe stenosis of the L. M2/M3, L > R. supraclinoid ICA stenosis.   CTP demonstrates Tmax >6s delay of 53cc in both hemispheres  MR brain demonstrates punctate infarction involving the R. white matter and L. centrum semiovale. Multiple chronic lacunar infarctions; Multiple +SWI microhemorrhages involving both cerebellar hemispheres, and bihemispheric.   A1c 5.7     ESR wnl; CRP wnl   RF 20 (mildly elevated)   Utox negative  TTE EF wnl; Moderate LVH; Normal LA; No PFO.   DSA +multifocal ICAD but without classic beading appearance that would suggest vasculitis.     Impression:   1. Intermittent vertigo exacerbated by head movements w/ unidirectional right-beating nystagmus -- etiology likely secondary to BPPV.   2. Incidental bilateral cerebral infarction in setting of multifocal intracranial stenosis -- mechanism of stroke likely intracranial large artery atherosclerotic disease. Less likely vasculitis given DSA findings and clinical history.     Plan:   ASA 81mg + Plavix 75mg for three months followed by ASA 81mg indefinitely per SAMMPRIS   SBP goal 140-180; Gradual normotension beginning tomorrow  JONNY  [facility]  Lovenox SQ for DVT ppx     45 minutes spent on total encounter. The necessity of the time spent during the encounter on this date of service was due to:     Review of imaging and chart; obtaining history; examination of pt; discussion and coordination of care, and discussion of lifestyle modification and risk factor control.

## 2023-10-25 ENCOUNTER — TRANSCRIPTION ENCOUNTER (OUTPATIENT)
Age: 58
End: 2023-10-25

## 2023-10-25 DIAGNOSIS — M25.562 PAIN IN LEFT KNEE: ICD-10-CM

## 2023-10-25 DIAGNOSIS — R79.89 OTHER SPECIFIED ABNORMAL FINDINGS OF BLOOD CHEMISTRY: ICD-10-CM

## 2023-10-25 DIAGNOSIS — I10 ESSENTIAL (PRIMARY) HYPERTENSION: ICD-10-CM

## 2023-10-25 DIAGNOSIS — R73.03 PREDIABETES: ICD-10-CM

## 2023-10-25 DIAGNOSIS — I63.9 CEREBRAL INFARCTION, UNSPECIFIED: ICD-10-CM

## 2023-10-25 LAB
ANA PAT FLD IF-IMP: ABNORMAL
ANA PAT FLD IF-IMP: ABNORMAL
ANA TITR SER: ABNORMAL
ANA TITR SER: ABNORMAL
ANION GAP SERPL CALC-SCNC: 12 MMOL/L — SIGNIFICANT CHANGE UP (ref 5–17)
ANION GAP SERPL CALC-SCNC: 12 MMOL/L — SIGNIFICANT CHANGE UP (ref 5–17)
BUN SERPL-MCNC: 19 MG/DL — SIGNIFICANT CHANGE UP (ref 7–23)
BUN SERPL-MCNC: 19 MG/DL — SIGNIFICANT CHANGE UP (ref 7–23)
CALCIUM SERPL-MCNC: 9.3 MG/DL — SIGNIFICANT CHANGE UP (ref 8.4–10.5)
CALCIUM SERPL-MCNC: 9.3 MG/DL — SIGNIFICANT CHANGE UP (ref 8.4–10.5)
CCP IGG SERPL-ACNC: <8 UNITS — SIGNIFICANT CHANGE UP
CCP IGG SERPL-ACNC: <8 UNITS — SIGNIFICANT CHANGE UP
CHLORIDE SERPL-SCNC: 106 MMOL/L — SIGNIFICANT CHANGE UP (ref 96–108)
CHLORIDE SERPL-SCNC: 106 MMOL/L — SIGNIFICANT CHANGE UP (ref 96–108)
CO2 SERPL-SCNC: 21 MMOL/L — LOW (ref 22–31)
CO2 SERPL-SCNC: 21 MMOL/L — LOW (ref 22–31)
CREAT SERPL-MCNC: 1 MG/DL — SIGNIFICANT CHANGE UP (ref 0.5–1.3)
CREAT SERPL-MCNC: 1 MG/DL — SIGNIFICANT CHANGE UP (ref 0.5–1.3)
CRYOGLOB SERPL-MCNC: NEGATIVE — SIGNIFICANT CHANGE UP
CRYOGLOB SERPL-MCNC: NEGATIVE — SIGNIFICANT CHANGE UP
EGFR: 87 ML/MIN/1.73M2 — SIGNIFICANT CHANGE UP
EGFR: 87 ML/MIN/1.73M2 — SIGNIFICANT CHANGE UP
GLUCOSE SERPL-MCNC: 103 MG/DL — HIGH (ref 70–99)
GLUCOSE SERPL-MCNC: 103 MG/DL — HIGH (ref 70–99)
HCT VFR BLD CALC: 42.4 % — SIGNIFICANT CHANGE UP (ref 39–50)
HCT VFR BLD CALC: 42.4 % — SIGNIFICANT CHANGE UP (ref 39–50)
HGB BLD-MCNC: 14.6 G/DL — SIGNIFICANT CHANGE UP (ref 13–17)
HGB BLD-MCNC: 14.6 G/DL — SIGNIFICANT CHANGE UP (ref 13–17)
MAGNESIUM SERPL-MCNC: 2 MG/DL — SIGNIFICANT CHANGE UP (ref 1.6–2.6)
MAGNESIUM SERPL-MCNC: 2 MG/DL — SIGNIFICANT CHANGE UP (ref 1.6–2.6)
MCHC RBC-ENTMCNC: 33 PG — SIGNIFICANT CHANGE UP (ref 27–34)
MCHC RBC-ENTMCNC: 33 PG — SIGNIFICANT CHANGE UP (ref 27–34)
MCHC RBC-ENTMCNC: 34.4 GM/DL — SIGNIFICANT CHANGE UP (ref 32–36)
MCHC RBC-ENTMCNC: 34.4 GM/DL — SIGNIFICANT CHANGE UP (ref 32–36)
MCV RBC AUTO: 95.7 FL — SIGNIFICANT CHANGE UP (ref 80–100)
MCV RBC AUTO: 95.7 FL — SIGNIFICANT CHANGE UP (ref 80–100)
NRBC # BLD: 0 /100 WBCS — SIGNIFICANT CHANGE UP (ref 0–0)
NRBC # BLD: 0 /100 WBCS — SIGNIFICANT CHANGE UP (ref 0–0)
PHOSPHATE SERPL-MCNC: 3.1 MG/DL — SIGNIFICANT CHANGE UP (ref 2.5–4.5)
PHOSPHATE SERPL-MCNC: 3.1 MG/DL — SIGNIFICANT CHANGE UP (ref 2.5–4.5)
PLATELET # BLD AUTO: 220 K/UL — SIGNIFICANT CHANGE UP (ref 150–400)
PLATELET # BLD AUTO: 220 K/UL — SIGNIFICANT CHANGE UP (ref 150–400)
POTASSIUM SERPL-MCNC: 3.7 MMOL/L — SIGNIFICANT CHANGE UP (ref 3.5–5.3)
POTASSIUM SERPL-MCNC: 3.7 MMOL/L — SIGNIFICANT CHANGE UP (ref 3.5–5.3)
POTASSIUM SERPL-SCNC: 3.7 MMOL/L — SIGNIFICANT CHANGE UP (ref 3.5–5.3)
POTASSIUM SERPL-SCNC: 3.7 MMOL/L — SIGNIFICANT CHANGE UP (ref 3.5–5.3)
RBC # BLD: 4.43 M/UL — SIGNIFICANT CHANGE UP (ref 4.2–5.8)
RBC # BLD: 4.43 M/UL — SIGNIFICANT CHANGE UP (ref 4.2–5.8)
RBC # FLD: 12.2 % — SIGNIFICANT CHANGE UP (ref 10.3–14.5)
RBC # FLD: 12.2 % — SIGNIFICANT CHANGE UP (ref 10.3–14.5)
RF+CCP IGG SER-IMP: NEGATIVE — SIGNIFICANT CHANGE UP
RF+CCP IGG SER-IMP: NEGATIVE — SIGNIFICANT CHANGE UP
SODIUM SERPL-SCNC: 139 MMOL/L — SIGNIFICANT CHANGE UP (ref 135–145)
SODIUM SERPL-SCNC: 139 MMOL/L — SIGNIFICANT CHANGE UP (ref 135–145)
WBC # BLD: 8.72 K/UL — SIGNIFICANT CHANGE UP (ref 3.8–10.5)
WBC # BLD: 8.72 K/UL — SIGNIFICANT CHANGE UP (ref 3.8–10.5)
WBC # FLD AUTO: 8.72 K/UL — SIGNIFICANT CHANGE UP (ref 3.8–10.5)
WBC # FLD AUTO: 8.72 K/UL — SIGNIFICANT CHANGE UP (ref 3.8–10.5)

## 2023-10-25 PROCEDURE — 76376 3D RENDER W/INTRP POSTPROCES: CPT | Mod: 26

## 2023-10-25 PROCEDURE — 99221 1ST HOSP IP/OBS SF/LOW 40: CPT

## 2023-10-25 PROCEDURE — 99222 1ST HOSP IP/OBS MODERATE 55: CPT

## 2023-10-25 PROCEDURE — 73560 X-RAY EXAM OF KNEE 1 OR 2: CPT | Mod: 26,LT

## 2023-10-25 PROCEDURE — 93312 ECHO TRANSESOPHAGEAL: CPT | Mod: 26

## 2023-10-25 PROCEDURE — 99232 SBSQ HOSP IP/OBS MODERATE 35: CPT

## 2023-10-25 RX ORDER — CLOPIDOGREL BISULFATE 75 MG/1
75 TABLET, FILM COATED ORAL DAILY
Refills: 0 | Status: DISCONTINUED | OUTPATIENT
Start: 2023-10-25 | End: 2023-11-01

## 2023-10-25 RX ORDER — ENOXAPARIN SODIUM 100 MG/ML
40 INJECTION SUBCUTANEOUS EVERY 24 HOURS
Refills: 0 | Status: DISCONTINUED | OUTPATIENT
Start: 2023-10-25 | End: 2023-11-01

## 2023-10-25 RX ORDER — ASPIRIN/CALCIUM CARB/MAGNESIUM 324 MG
81 TABLET ORAL DAILY
Refills: 0 | Status: DISCONTINUED | OUTPATIENT
Start: 2023-10-25 | End: 2023-10-25

## 2023-10-25 RX ORDER — LIDOCAINE 4 G/100G
1 CREAM TOPICAL EVERY 24 HOURS
Refills: 0 | Status: DISCONTINUED | OUTPATIENT
Start: 2023-10-25 | End: 2023-10-29

## 2023-10-25 RX ORDER — TRAMADOL HYDROCHLORIDE 50 MG/1
25 TABLET ORAL EVERY 6 HOURS
Refills: 0 | Status: DISCONTINUED | OUTPATIENT
Start: 2023-10-25 | End: 2023-10-25

## 2023-10-25 RX ORDER — LISINOPRIL 2.5 MG/1
10 TABLET ORAL ONCE
Refills: 0 | Status: COMPLETED | OUTPATIENT
Start: 2023-10-25 | End: 2023-10-25

## 2023-10-25 RX ORDER — LISINOPRIL 2.5 MG/1
20 TABLET ORAL DAILY
Refills: 0 | Status: DISCONTINUED | OUTPATIENT
Start: 2023-10-26 | End: 2023-11-01

## 2023-10-25 RX ORDER — CLOPIDOGREL BISULFATE 75 MG/1
75 TABLET, FILM COATED ORAL DAILY
Refills: 0 | Status: DISCONTINUED | OUTPATIENT
Start: 2023-10-25 | End: 2023-10-25

## 2023-10-25 RX ORDER — POTASSIUM CHLORIDE 20 MEQ
40 PACKET (EA) ORAL ONCE
Refills: 0 | Status: COMPLETED | OUTPATIENT
Start: 2023-10-25 | End: 2023-10-25

## 2023-10-25 RX ORDER — ASPIRIN/CALCIUM CARB/MAGNESIUM 324 MG
81 TABLET ORAL DAILY
Refills: 0 | Status: DISCONTINUED | OUTPATIENT
Start: 2023-10-25 | End: 2023-11-01

## 2023-10-25 RX ADMIN — Medication 100 MILLIGRAM(S): at 22:24

## 2023-10-25 RX ADMIN — LIDOCAINE 1 PATCH: 4 CREAM TOPICAL at 19:01

## 2023-10-25 RX ADMIN — LISINOPRIL 10 MILLIGRAM(S): 2.5 TABLET ORAL at 13:52

## 2023-10-25 RX ADMIN — Medication 40 MILLIEQUIVALENT(S): at 07:40

## 2023-10-25 RX ADMIN — ENOXAPARIN SODIUM 40 MILLIGRAM(S): 100 INJECTION SUBCUTANEOUS at 13:52

## 2023-10-25 RX ADMIN — TRAMADOL HYDROCHLORIDE 25 MILLIGRAM(S): 50 TABLET ORAL at 18:12

## 2023-10-25 RX ADMIN — LISINOPRIL 10 MILLIGRAM(S): 2.5 TABLET ORAL at 06:13

## 2023-10-25 RX ADMIN — CLOPIDOGREL BISULFATE 75 MILLIGRAM(S): 75 TABLET, FILM COATED ORAL at 13:51

## 2023-10-25 RX ADMIN — Medication 12.5 MILLIGRAM(S): at 21:18

## 2023-10-25 RX ADMIN — LIDOCAINE 1 PATCH: 4 CREAM TOPICAL at 13:52

## 2023-10-25 RX ADMIN — Medication 12.5 MILLIGRAM(S): at 06:12

## 2023-10-25 RX ADMIN — Medication 100 MILLIGRAM(S): at 13:51

## 2023-10-25 RX ADMIN — Medication 12.5 MILLIGRAM(S): at 13:51

## 2023-10-25 RX ADMIN — TRAMADOL HYDROCHLORIDE 25 MILLIGRAM(S): 50 TABLET ORAL at 17:12

## 2023-10-25 RX ADMIN — ATORVASTATIN CALCIUM 80 MILLIGRAM(S): 80 TABLET, FILM COATED ORAL at 21:19

## 2023-10-25 RX ADMIN — Medication 81 MILLIGRAM(S): at 13:52

## 2023-10-25 NOTE — PROGRESS NOTE ADULT - ASSESSMENT
57y Male with PMHx of HTN (noncompliant with meds) originally presented to [facility] ED c/o room spinning dizziness x 1 week worse with head movement. CTH with no acute intracranial injury. Microvascular disease. Chronic lacunar infarcts. CTA head and neck with mild dot-appearance to the bilateral ACAs and MCAs that may represent vasculitis. Short severe segmental narrowing of the left proximal A2 segment of the anterior cerebral artery. Severe focal narrowing of the left M2/M3 segment of the middle cerebral artery. Normal CTA of the extracranial circulation. No evidence of carotid stenosis. Was given total of 30mg labetalol and 30mg hydralazine with SBP still 230s and so cardene gtt was started. Given imaging findings, stroke was consulted. Upon initial examination, NIHSS 0. Was awaiting MICU bed for admission under the stroke service for a stroke workup. Patient then became confused/aphasic in setting of SBP 130s. Stroke code was called. CT imaging unchanged. Exam slowly improving with SBP in the low 200s. Discussed case with Dr. Myles, since exam is improving and no change in imaging, likely symptoms are due to significant drop in BP. Admitted to MICU for stroke w/u. MRI brain punctate right periatrial white matter and left centrum semiovale, scattered punctate foci of microhemorrhages. MRA head with ICAD in L>R clinoid segments of ICA, left A2 severe narrowing, moderate left MCA post bifurcation narrowing. S/p cerebral angio 10/24 with multifocal ICAD. Stepped down to stroke tele.       Neuro  #CVA workup  - ASA 81mg + Plavix 75mg for three months followed by ASA 81mg indefinitely per [facility]   - continue atorvastatin 80mg daily  - q4hr stroke neuro checks and vitals  - MRI with punctate foci of acute ischemia within the right periatrial white matter and left centrum semiovale ovale. The SWI images demonstrates multiple scattered punctate foci of   micro hemorrhages. Flair reviewed by Dr. Myles, possible vasculitic lesions.   - MRA head -  ICAD in L>R clinoid segments of ICA, left A2 severe narrowing, moderate left MCA post bifurcation narrowing.    - Stroke Code HCT Results: No acute intracranial injury. Microvascular disease. Chronic lacunar infarcts.  - Stroke Code CTA Results: Mild dot-appearance to the bilateral ACAs and MCAs that may represent vasculitis. Short severe segmental narrowing of the left proximal A2 segment of the anterior cerebral artery, unchanged. Severe focal narrowing of the left M2/M3 segment of the middle cerebral artery, unchanged.  - Stroke education  - rheumatology consulted, appreciate recs: hepatitis panel neg, pending cyclic citrullinated peptide AB.   - vasculitis panel ordered, thus far RF +, EBV titers c/w prior infection   - s/p DOUGLAS 10/24: multifocal ICAD, no evidence of vasculitis     #BPPV - pt endorses dizzines upon positional changes  - c/w meclizine 12.5mg TID, may uptitrate   - obtain orthostatics     Cards  #HTN  Does not take BP meds at home  - SBP goal 140-180, can give PRN pushes if above 180  - gradual normotension starting 10/25  - continue labetalol 100mg BID  - c/w lisinopril 10mg daily   - TTE with bubble - EF 70-75%, no PFO  - f/u JONNY  - [facility] outpatient     #HLD  - high dose statin as above in CVA  - LDL results: 156    Pulm  - call provider if SPO2 < 94%    GI  #Nutrition/Fluids/Electrolytes   - replete K<4 and Mg <2  - Diet: reg diet  - IVF: n/a    Endocrine  - A1C results: 5.7  - TSH results: 5.090  - free T4 1.200, T3 105    Ortho  - Reports left knee pain after hitting his knee against the wall 2 days ago.   - on exam, L knee TTP, swollen and warm to touch  - f/u L knee ray  - pain control PRN    DVT Prophylaxis  - lovenox sq for DVT prophylaxis (restart 24 hours post angio)  - SCDs for DVT prophylaxis     Dispo: home PT/OT      Discussed daily hospital plans and goals with patient.     Discussed with Neurology Attending Dr. Romero    57y Male with PMHx of HTN (noncompliant with meds) originally presented to [facility] ED c/o room spinning dizziness x 1 week worse with head movement. CTH with no acute intracranial injury. Microvascular disease. Chronic lacunar infarcts. CTA head and neck with mild dot-appearance to the bilateral ACAs and MCAs that may represent vasculitis. Short severe segmental narrowing of the left proximal A2 segment of the anterior cerebral artery. Severe focal narrowing of the left M2/M3 segment of the middle cerebral artery. Normal CTA of the extracranial circulation. No evidence of carotid stenosis. Was given total of 30mg labetalol and 30mg hydralazine with SBP still 230s and so cardene gtt was started. Given imaging findings, stroke was consulted. Upon initial examination, NIHSS 0. Was awaiting MICU bed for admission under the stroke service for a stroke workup. Patient then became confused/aphasic in setting of SBP 130s. Stroke code was called. CT imaging unchanged. Exam slowly improving with SBP in the low 200s. Discussed case with Dr. Myles, since exam is improving and no change in imaging, likely symptoms are due to significant drop in BP. Admitted to MICU for stroke w/u. MRI brain punctate right periatrial white matter and left centrum semiovale, scattered punctate foci of microhemorrhages. MRA head with ICAD in L>R clinoid segments of ICA, left A2 severe narrowing, moderate left MCA post bifurcation narrowing. S/p cerebral angio 10/24 with multifocal ICAD. Stepped down to stroke tele.       Neuro  #CVA workup  - ASA 81mg + Plavix 75mg for three months followed by ASA 81mg indefinitely per [facility]   - continue atorvastatin 80mg daily  - q4hr stroke neuro checks and vitals  - MRI with punctate foci of acute ischemia within the right periatrial white matter and left centrum semiovale ovale. The SWI images demonstrates multiple scattered punctate foci of   micro hemorrhages. Flair reviewed by Dr. Myles, possible vasculitic lesions.   - MRA head -  ICAD in L>R clinoid segments of ICA, left A2 severe narrowing, moderate left MCA post bifurcation narrowing.    - Stroke Code HCT Results: No acute intracranial injury. Microvascular disease. Chronic lacunar infarcts.  - Stroke Code CTA Results: Mild dot-appearance to the bilateral ACAs and MCAs that may represent vasculitis. Short severe segmental narrowing of the left proximal A2 segment of the anterior cerebral artery, unchanged. Severe focal narrowing of the left M2/M3 segment of the middle cerebral artery, unchanged.  - Stroke education  - rheumatology consulted, appreciate recs: hepatitis panel neg, pending cyclic citrullinated peptide AB.   - vasculitis panel ordered, thus far RF +, EBV titers c/w prior infection   - s/p DOUGLAS 10/24: multifocal ICAD, no evidence of vasculitis     #BPPV - pt endorses dizzines upon positional changes  - c/w meclizine 12.5mg TID, may uptitrate     Cards  #HTN  Does not take BP meds at home  - gradual normotension starting 10/25  - c/w labetalol 100mg BID  - c/w lisinopril 20mg daily  - TTE with bubble - EF 70-75%, no PFO  - JONNY with pulmonary AVM, f/u LE dopplers  - EP consulted for ILR    #HLD  - high dose statin as above in CVA  - LDL results: 156    Pulm  - call provider if SPO2 < 94%    GI  #Nutrition/Fluids/Electrolytes   - replete K<4 and Mg <2  - Diet: reg diet  - IVF: n/a    Endocrine  - A1C results: 5.7  - TSH results: 5.090  - free T4 1.200, T3 105    Ortho  - Reports left knee pain after hitting his knee against the wall 2 days ago.   - on exam, L knee TTP, swollen and warm to touch  - f/u L knee ray  - pain control PRN    DVT Prophylaxis  - lovenox sq for DVT prophylaxis    - SCDs for DVT prophylaxis     Dispo: home PT/OT      Discussed daily hospital plans and goals with patient.     Discussed with Neurology Attending Dr. Romero    57y Male with PMHx of HTN (noncompliant with meds) originally presented to [facility] ED c/o room spinning dizziness x 1 week worse with head movement. CTH with no acute intracranial injury. Microvascular disease. Chronic lacunar infarcts. CTA head and neck with mild dot-appearance to the bilateral ACAs and MCAs that may represent vasculitis. Short severe segmental narrowing of the left proximal A2 segment of the anterior cerebral artery. Severe focal narrowing of the left M2/M3 segment of the middle cerebral artery. Normal CTA of the extracranial circulation. No evidence of carotid stenosis. Was given total of 30mg labetalol and 30mg hydralazine with SBP still 230s and so cardene gtt was started. Given imaging findings, stroke was consulted. Upon initial examination, NIHSS 0. Was awaiting MICU bed for admission under the stroke service for a stroke workup. Patient then became confused/aphasic in setting of SBP 130s. Stroke code was called. CT imaging unchanged. Exam slowly improving with SBP in the low 200s. Discussed case with Dr. Myles, since exam is improving and no change in imaging, likely symptoms are due to significant drop in BP. Admitted to MICU for stroke w/u. MRI brain punctate right periatrial white matter and left centrum semiovale, scattered punctate foci of microhemorrhages. MRA head with ICAD in L>R clinoid segments of ICA, left A2 severe narrowing, moderate left MCA post bifurcation narrowing. S/p cerebral angio 10/24 with multifocal ICAD. Stepped down to stroke tele.       Neuro  #CVA workup  - ASA 81mg + Plavix 75mg for three months followed by ASA 81mg indefinitely per [facility]   - continue atorvastatin 80mg daily  - q4hr stroke neuro checks and vitals  - MRI with punctate foci of acute ischemia within the right periatrial white matter and left centrum semiovale ovale. The SWI images demonstrates multiple scattered punctate foci of   micro hemorrhages. Flair reviewed by Dr. Myles, possible vasculitic lesions.   - MRA head -  ICAD in L>R clinoid segments of ICA, left A2 severe narrowing, moderate left MCA post bifurcation narrowing.    - Stroke Code HCT Results: No acute intracranial injury. Microvascular disease. Chronic lacunar infarcts.  - Stroke Code CTA Results: Mild dot-appearance to the bilateral ACAs and MCAs that may represent vasculitis. Short severe segmental narrowing of the left proximal A2 segment of the anterior cerebral artery, unchanged. Severe focal narrowing of the left M2/M3 segment of the middle cerebral artery, unchanged.  - Stroke education  - rheumatology consulted, appreciate recs: hepatitis panel neg, pending cyclic citrullinated peptide AB.   - vasculitis panel ordered, thus far RF +, EBV titers c/w prior infection   - s/p DOUGLAS 10/24: multifocal ICAD, no evidence of vasculitis     #BPPV - pt endorses dizzines upon positional changes  - c/w meclizine 12.5mg TID, may uptitrate     Cards  #HTN  Does not take BP meds at home  - gradual normotension starting 10/25  - c/w labetalol 100mg BID  - c/w lisinopril 20mg daily  - TTE with bubble - EF 70-75%, no PFO  - JONNY with pulmonary AVM, f/u LE dopplers  - EP consulted for ILR    #HLD  - high dose statin as above in CVA  - LDL results: 156    Pulm  - call provider if SPO2 < 94%    GI  #Nutrition/Fluids/Electrolytes   - replete K<4 and Mg <2  - Diet: reg diet  - IVF: n/a    Endocrine  - A1C results: 5.7  - TSH results: 5.090  - free T4 1.200, T3 105    Ortho  - Reports left knee pain after hitting his knee against the wall 2 days ago.   - on exam, L knee TTP, swollen and warm to touch  - L knee xray: Medial knee joint mild to moderate narrowing, joint effusion, no acute fracture or dislocation.  - pain control PRN    DVT Prophylaxis  - lovenox sq for DVT prophylaxis    - SCDs for DVT prophylaxis     Dispo: home PT/OT      Discussed daily hospital plans and goals with patient.     Discussed with Neurology Attending Dr. Romero    57y Male with PMHx of HTN (noncompliant with meds) originally presented to [facility] ED c/o room spinning dizziness x 1 week worse with head movement. CTH with no acute intracranial injury. Microvascular disease. Chronic lacunar infarcts. CTA head and neck with mild dot-appearance to the bilateral ACAs and MCAs that may represent vasculitis. Short severe segmental narrowing of the left proximal A2 segment of the anterior cerebral artery. Severe focal narrowing of the left M2/M3 segment of the middle cerebral artery. Normal CTA of the extracranial circulation. No evidence of carotid stenosis. Was given total of 30mg labetalol and 30mg hydralazine with SBP still 230s and so cardene gtt was started. Given imaging findings, stroke was consulted. Upon initial examination, NIHSS 0. Was awaiting MICU bed for admission under the stroke service for a stroke workup. Patient then became confused/aphasic in setting of SBP 130s. Stroke code was called. CT imaging unchanged. Exam slowly improving with SBP in the low 200s. Discussed case with Dr. Myles, since exam is improving and no change in imaging, likely symptoms are due to significant drop in BP. Admitted to MICU for stroke w/u. MRI brain punctate right periatrial white matter and left centrum semiovale, scattered punctate foci of microhemorrhages. MRA head with ICAD in L>R clinoid segments of ICA, left A2 severe narrowing, moderate left MCA post bifurcation narrowing. S/p cerebral angio 10/24 with multifocal ICAD. Stepped down to stroke tele.       Neuro  #CVA workup  - ASA 81mg + Plavix 75mg for three months followed by ASA 81mg indefinitely per [facility]   - continue atorvastatin 80mg daily  - q4hr stroke neuro checks and vitals  - MRI with punctate foci of acute ischemia within the right periatrial white matter and left centrum semiovale ovale. The SWI images demonstrates multiple scattered punctate foci of   micro hemorrhages. Flair reviewed by Dr. Myles, possible vasculitic lesions.   - MRA head -  ICAD in L>R clinoid segments of ICA, left A2 severe narrowing, moderate left MCA post bifurcation narrowing.    - Stroke Code HCT Results: No acute intracranial injury. Microvascular disease. Chronic lacunar infarcts.  - Stroke Code CTA Results: Mild dot-appearance to the bilateral ACAs and MCAs that may represent vasculitis. Short severe segmental narrowing of the left proximal A2 segment of the anterior cerebral artery, unchanged. Severe focal narrowing of the left M2/M3 segment of the middle cerebral artery, unchanged.  - Stroke education  - rheumatology consulted, appreciate recs: hepatitis panel neg, pending cyclic citrullinated peptide AB.   - vasculitis panel ordered, thus far RF +, EBV titers c/w prior infection   - s/p DOUGLAS 10/24: multifocal ICAD, no evidence of vasculitis     #BPPV - pt endorses dizzines upon positional changes  - c/w meclizine 12.5mg TID, may uptitrate     Cards  #HTN  Does not take BP meds at home  - gradual normotension starting 10/25  - c/w labetalol 100mg BID  - c/w lisinopril 20mg daily  - TTE with bubble - EF 70-75%, no PFO  - JONNY with pulmonary AVM, f/u LE dopplers  - EP consulted for ILR    #HLD  - high dose statin as above in CVA  - LDL results: 156    Pulm  - call provider if SPO2 < 94%    GI  #Nutrition/Fluids/Electrolytes   - replete K<4 and Mg <2  - Diet: reg diet  - IVF: n/a    Endocrine  - A1C results: 5.7  - TSH results: 5.090  - free T4 1.200, T3 105    Ortho  - Reports left knee pain after hitting his knee against the wall 2 days ago.   - on exam, L knee TTP, swollen and warm to touch  - L knee xray: Medial knee joint mild to moderate narrowing, joint effusion, no acute fracture or dislocation.  - ortho consulted  - pain control PRN    DVT Prophylaxis  - lovenox sq for DVT prophylaxis    - SCDs for DVT prophylaxis     Dispo: home PT/OT      Discussed daily hospital plans and goals with patient.     Discussed with Neurology Attending Dr. Romero    57y Male with PMHx of HTN (noncompliant with meds) originally presented to [facility] ED c/o room spinning dizziness x 1 week worse with head movement. CTH with no acute intracranial injury. Microvascular disease. Chronic lacunar infarcts. CTA head and neck with mild dot-appearance to the bilateral ACAs and MCAs that may represent vasculitis. Short severe segmental narrowing of the left proximal A2 segment of the anterior cerebral artery. Severe focal narrowing of the left M2/M3 segment of the middle cerebral artery. Normal CTA of the extracranial circulation. No evidence of carotid stenosis. Was given total of 30mg labetalol and 30mg hydralazine with SBP still 230s and so cardene gtt was started. Given imaging findings, stroke was consulted. Upon initial examination, NIHSS 0. Was awaiting MICU bed for admission under the stroke service for a stroke workup. Patient then became confused/aphasic in setting of SBP 130s. Stroke code was called. CT imaging unchanged. Exam slowly improving with SBP in the low 200s. Discussed case with Dr. Myles, since exam is improving and no change in imaging, likely symptoms are due to significant drop in BP. Admitted to MICU for stroke w/u. MRI brain punctate right periatrial white matter and left centrum semiovale, scattered punctate foci of microhemorrhages. MRA head with ICAD in L>R clinoid segments of ICA, left A2 severe narrowing, moderate left MCA post bifurcation narrowing. S/p cerebral angio 10/24 with multifocal ICAD. Stepped down to stroke tele.       Neuro  #CVA workup  - ASA 81mg + Plavix 75mg for three months followed by ASA 81mg indefinitely per [facility]   - continue atorvastatin 80mg daily  - q4hr stroke neuro checks and vitals  - MRI with punctate foci of acute ischemia within the right periatrial white matter and left centrum semiovale ovale. The SWI images demonstrates multiple scattered punctate foci of   micro hemorrhages. Flair reviewed by Dr. Myles, possible vasculitic lesions.   - MRA head -  ICAD in L>R clinoid segments of ICA, left A2 severe narrowing, moderate left MCA post bifurcation narrowing.    - Stroke Code HCT Results: No acute intracranial injury. Microvascular disease. Chronic lacunar infarcts.  - Stroke Code CTA Results: Mild dot-appearance to the bilateral ACAs and MCAs that may represent vasculitis. Short severe segmental narrowing of the left proximal A2 segment of the anterior cerebral artery, unchanged. Severe focal narrowing of the left M2/M3 segment of the middle cerebral artery, unchanged.  - Stroke education  - rheumatology consulted, appreciate recs: hepatitis panel neg, pending cyclic citrullinated peptide AB.   - vasculitis panel ordered, thus far RF +, EBV titers c/w prior infection   - s/p DOUGLAS 10/24: multifocal ICAD, no evidence of vasculitis     #BPPV - pt endorses dizzines upon positional changes  - c/w meclizine 12.5mg TID, may uptitrate     Cards  #HTN  Does not take BP meds at home  - gradual normotension starting 10/25  - c/w labetalol 100mg BID  - c/w lisinopril 20mg daily  - TTE with bubble - EF 70-75%, no PFO  - JONNY with pulmonary AVM, f/u LE dopplers  - EP consulted for ILR    #HLD  - high dose statin as above in CVA  - LDL results: 156    Pulm  - call provider if SPO2 < 94%    GI  #Nutrition/Fluids/Electrolytes   - replete K<4 and Mg <2  - Diet: reg diet  - IVF: n/a    Endocrine  - A1C results: 5.7  - TSH results: 5.090  - free T4 1.200, T3 105    Ortho  - Reports left knee pain after hitting his knee against the wall 2 days ago.   - on exam, L knee TTP, swollen and warm to touch  - L knee xray: Medial knee joint mild to moderate narrowing, joint effusion, no acute fracture or dislocation.  - ortho consulted  - pain control PRN    DVT Prophylaxis  - lovenox sq for DVT prophylaxis    - SCDs for DVT prophylaxis     Dispo: [place]     Discussed daily hospital plans and goals with patient.     Discussed with Neurology Attending Dr. Romero    57y Male with PMHx of HTN (noncompliant with meds) originally presented to [facility] ED c/o room spinning dizziness x 1 week worse with head movement. CTH with no acute intracranial injury. Microvascular disease. Chronic lacunar infarcts. CTA head and neck with mild dot-appearance to the bilateral ACAs and MCAs that may represent vasculitis. Short severe segmental narrowing of the left proximal A2 segment of the anterior cerebral artery. Severe focal narrowing of the left M2/M3 segment of the middle cerebral artery. Normal CTA of the extracranial circulation. No evidence of carotid stenosis. Was given total of 30mg labetalol and 30mg hydralazine with SBP still 230s and so cardene gtt was started. Given imaging findings, stroke was consulted. Upon initial examination, NIHSS 0. Was awaiting MICU bed for admission under the stroke service for a stroke workup. Patient then became confused/aphasic in setting of SBP 130s. Stroke code was called. CT imaging unchanged. Exam slowly improving with SBP in the low 200s. Discussed case with Dr. Myles, since exam is improving and no change in imaging, likely symptoms are due to significant drop in BP. Admitted to MICU for stroke w/u. MRI brain punctate right periatrial white matter and left centrum semiovale, scattered punctate foci of microhemorrhages. MRA head with ICAD in L>R clinoid segments of ICA, left A2 severe narrowing, moderate left MCA post bifurcation narrowing. S/p cerebral angio 10/24 with multifocal ICAD. Stepped down to stroke tele.       Neuro  #CVA workup  - ASA 81mg + Plavix 75mg for three months followed by ASA 81mg indefinitely per [facility]   - continue atorvastatin 80mg daily  - q4hr stroke neuro checks and vitals  - MRI with punctate foci of acute ischemia within the right periatrial white matter and left centrum semiovale ovale. The SWI images demonstrates multiple scattered punctate foci of   micro hemorrhages. Flair reviewed by Dr. Myles, possible vasculitic lesions.   - MRA head -  ICAD in L>R clinoid segments of ICA, left A2 severe narrowing, moderate left MCA post bifurcation narrowing.    - Stroke Code HCT Results: No acute intracranial injury. Microvascular disease. Chronic lacunar infarcts.  - Stroke Code CTA Results: Mild dot-appearance to the bilateral ACAs and MCAs that may represent vasculitis. Short severe segmental narrowing of the left proximal A2 segment of the anterior cerebral artery, unchanged. Severe focal narrowing of the left M2/M3 segment of the middle cerebral artery, unchanged.  - Stroke education  - rheumatology consulted, appreciate recs: hepatitis panel neg, pending cyclic citrullinated peptide AB.   - vasculitis panel ordered, thus far RF +, EBV titers c/w prior infection   - s/p DOUGLAS 10/24: multifocal ICAD, no evidence of vasculitis     #BPPV - pt endorses dizzines upon positional changes  - c/w meclizine 12.5mg TID, may uptitrate     Cards  #HTN  Does not take BP meds at home  - gradual normotension starting 10/25  - c/w labetalol 100mg BID  - c/w lisinopril 20mg daily  - TTE with bubble - EF 70-75%, no PFO  - JONNY with pulmonary AVM, f/u LE dopplers  - EP consulted for ILR    #HLD  - high dose statin as above in CVA  - LDL results: 156    Pulm  - call provider if SPO2 < 94%    GI  #Nutrition/Fluids/Electrolytes   - replete K<4 and Mg <2  - Diet: reg diet  - IVF: n/a    Endocrine  - A1C results: 5.7  - TSH results: 5.090  - free T4 1.200, T3 105    Ortho  - Reports left knee pain after hitting his knee against the wall 2 days ago.   - on exam, L knee TTP, swollen and warm to touch  - L knee xray: Medial knee joint mild to moderate narrowing, joint effusion, no acute fracture or dislocation.  - ortho consulted  - lidocaine patch, PRN tramadol 25mg q6h    DVT Prophylaxis  - lovenox sq for DVT prophylaxis    - SCDs for DVT prophylaxis     Dispo: [place]     Discussed daily hospital plans and goals with patient.     Discussed with Neurology Attending Dr. Romero

## 2023-10-25 NOTE — CONSULT NOTE ADULT - PROBLEM SELECTOR PROBLEM 5
----- Message from Herminia Saba RN sent at 9/22/2022 11:18 AM CDT -----  Good morning,     I spoke with this patient yesterday. She verbalized still being interested in using a CGM. The patient stated that she did not receive the dexacom that was ordered previously.   Please call and advise the patient.       Thank you for your assistance,   Herminia Saba RN  Outpatient Complex Care Management  [phone]      Prediabetes

## 2023-10-25 NOTE — PROGRESS NOTE ADULT - NS ATTEND AMEND GEN_ALL_CORE FT
57 year old man w/ PMH of HTN (non compliant) presented w/ intermittent roomspinning dizziness. On exam, patient w/ unidirectional R. beating nystagmus on horizontal and vertical gaze, otherwise normal.     CTH negative  CTA h/n demonstrated multifocal ICAD, including severe stenosis of the L. A2, severe stenosis of the L. M2/M3, L > R. supraclinoid ICA stenosis.   CTP demonstrates Tmax >6s delay of 53cc in both hemispheres  MR brain demonstrates punctate infarction involving the R. white matter and L. centrum semiovale. Multiple chronic lacunar infarctions; Multiple +SWI microhemorrhages involving both cerebellar hemispheres, and bihemispheric.   A1c 5.7     ESR wnl; CRP wnl   RF 20 (mildly elevated)   Utox negative  TTE EF wnl; Moderate LVH; Normal LA; No PFO.   DSA +multifocal ICAD but without classic beading appearance that would suggest vasculitis.   JONNY +pulmonary AVM    Impression:   1. Intermittent vertigo exacerbated by head movements w/ unidirectional right-beating nystagmus -- etiology likely secondary to BPPV.   2. Incidental bilateral cerebral infarction in setting of multifocal intracranial stenosis -- mechanism of stroke likely intracranial large artery atherosclerotic disease. Less likely vasculitis given DSA findings and clinical history.     Plan:   ASA 81mg + Plavix 75mg for three months followed by ASA 81mg indefinitely per KODAKRIS   Gradual normotension   LE US   ILR   Lovenox SQ for DVT ppx   PT - Home    45 minutes spent on total encounter. The necessity of the time spent during the encounter on this date of service was due to:     Review of imaging and chart; obtaining history; examination of pt; discussion and coordination of care, and discussion of lifestyle modification and risk factor control.

## 2023-10-25 NOTE — CONSULT NOTE ADULT - PROBLEM SELECTOR RECOMMENDATION 9
Case and films d/w Dr. Lara,  Anticipate cerebral angiography this Tuesday,  Will pre-op and consent patient on 10/23.
MRI reviewed, shows "Punctate foci of acute ischemia within the right periatrial white matter and left centrum semiovale ovale;" also found to have ICAD on angiogram; cont. work-up and mgmt per Neuro and Rheumatology; PT/OT; f/u JONNY, plan for ILR placement; on DAPT + statin

## 2023-10-25 NOTE — PROGRESS NOTE ADULT - SUBJECTIVE AND OBJECTIVE BOX
Neurology Stroke Progress Note    INTERVAL HPI/OVERNIGHT EVENTS:  Patient seen and examined. Reports left knee pain after hitting his knee against the wall 2 days ago.     MEDICATIONS  (STANDING):  atorvastatin 80 milliGRAM(s) Oral at bedtime  influenza   Vaccine 0.5 milliLiter(s) IntraMuscular once  labetalol 100 milliGRAM(s) Oral every 12 hours  lisinopril 10 milliGRAM(s) Oral daily  meclizine 12.5 milliGRAM(s) Oral every 8 hours    MEDICATIONS  (PRN):      Allergies    No Known Allergies    Intolerances        Vital Signs Last 24 Hrs  T(C): 37.6 (25 Oct 2023 04:18), Max: 37.6 (25 Oct 2023 04:18)  T(F): 99.6 (25 Oct 2023 04:18), Max: 99.6 (25 Oct 2023 04:18)  HR: 83 (25 Oct 2023 06:15) (64 - 83)  BP: 149/91 (25 Oct 2023 06:15) (137/86 - 195/115)  BP(mean): 115 (25 Oct 2023 06:15) (103 - 146)  RR: 18 (25 Oct 2023 06:15) (16 - 20)  SpO2: 94% (25 Oct 2023 06:15) (94% - 99%)    Parameters below as of 25 Oct 2023 06:15  Patient On (Oxygen Delivery Method): room air    Neurologic:  -Mental status: Awake, alert, oriented to person, place, and time. Speech is fluent, with intact naming, repetition, and comprehension, no dysarthria. Follows commands. Attention/concentration intact.  -Cranial nerves:   II: Visual fields are full to confrontation.  III, IV, VI: EOMs intact, mild nystagmus on right gaze appreciated. Pupils equally round and reactive to light  V:  Facial sensation V1-V3 equal and intact   VII: Face is symmetric with normal eye closure and smile  VIII: Hearing is bilaterally intact  XII: Tongue protrudes midline  Motor: Normal bulk and tone. No pronator drift. Strength bilateral upper extremity 5/5, bilateral lower extremities 5/5. Left knee swollen, TTP and warm to touch. No erythema.  Sensation: Intact to light touch bilaterally. No neglect or extinction on double simultaneous testing.  Coordination: No dysmetria on finger-to-nose bilaterally    LABS:                        14.6   8.72  )-----------( 220      ( 25 Oct 2023 05:30 )             42.4     10-25    139  |  106  |  19  ----------------------------<  103<H>  3.7   |  21<L>  |  1.00    Ca    9.3      25 Oct 2023 05:30  Phos  3.1     10-25  Mg     2.0     10-25        Urinalysis Basic - ( 25 Oct 2023 05:30 )    Color: x / Appearance: x / SG: x / pH: x  Gluc: 103 mg/dL / Ketone: x  / Bili: x / Urobili: x   Blood: x / Protein: x / Nitrite: x   Leuk Esterase: x / RBC: x / WBC x   Sq Epi: x / Non Sq Epi: x / Bacteria: x        RADIOLOGY & ADDITIONAL TESTS:  reviewed

## 2023-10-25 NOTE — CONSULT NOTE ADULT - SUBJECTIVE AND OBJECTIVE BOX
Orthopaedic Consult Note    HPI  57yMale pmhx HTN, admitted to stroke tele for multiple brain infarcts c/o left knee pain after hitting his leg against a wall. Pt is a poor historian, cannot recall when he exactly hit his left knee but reports medial left knee pain. Pt ambulates without assistance at baseline. Denies numbness/tingling of bilateral lower extremities. Pt denies pain to any other joints. Orthopaedic surgery consulted for left knee effusion on Xray.     PAST MEDICAL & SURGICAL HISTORY:  HTN (hypertension)        Allergies    No Known Allergies    Intolerances      MEDICATIONS  (STANDING):  aspirin enteric coated 81 milliGRAM(s) Oral daily  atorvastatin 80 milliGRAM(s) Oral at bedtime  clopidogrel Tablet 75 milliGRAM(s) Oral daily  enoxaparin Injectable 40 milliGRAM(s) SubCutaneous every 24 hours  influenza   Vaccine 0.5 milliLiter(s) IntraMuscular once  labetalol 100 milliGRAM(s) Oral every 12 hours  lidocaine   4% Patch 1 Patch Transdermal every 24 hours  meclizine 12.5 milliGRAM(s) Oral every 8 hours      Vital Signs Last 24 Hrs  T(C): 36.7 (25 Oct 2023 13:29), Max: 37.6 (25 Oct 2023 04:18)  T(F): 98.1 (25 Oct 2023 13:29), Max: 99.6 (25 Oct 2023 04:18)  HR: 84 (25 Oct 2023 14:55) (66 - 84)  BP: 167/98 (25 Oct 2023 14:55) (137/86 - 195/115)  BP(mean): 126 (25 Oct 2023 14:55) (105 - 146)  RR: 23 (25 Oct 2023 14:55) (16 - 23)  SpO2: 95% (25 Oct 2023 13:52) (94% - 99%)    Parameters below as of 25 Oct 2023 13:52  Patient On (Oxygen Delivery Method): room air        Physical Exam: Pt laying comfortably in bed, NAD.  Skin warm and well perfused, no visible wounds/erythema/ecchymoses, no swelling appreciated   Medial aspect of left knee tender to palpation/more localized to quadriceps, nontender to palpation anterior or lateral aspect of joint   Pt actively flexes knee to 100 degrees without significant discomfort   No pain to left knee with varus or valgus stress  Quad/EHL/FHL/TA/GS 5/5 left lower extremity  Calves soft and nontender to palpation                             14.6   8.72  )-----------( 220      ( 25 Oct 2023 05:30 )             42.4     10-25    139  |  106  |  19  ----------------------------<  103<H>  3.7   |  21<L>  |  1.00    Ca    9.3      25 Oct 2023 05:30  Phos  3.1     10-25  Mg     2.0     10-25      Imaging:   ACC: [id number] EXAM:  XR KNEE 1-2 VIEWS LT   ORDERED BY: CARRIE OBANDO     PROCEDURE DATE:  10/25/2023          INTERPRETATION:  Clinical history reason for exam: Trauma.    2 views.    Findings/  impression: Medial knee joint mild to moderate narrowing, joint effusion,   no acute fracture or dislocation.    --- End of Report ---            VERN BRODERICK MD; Attending Radiologist  This document has been electronically signed. Oct 25 2023 12:41PM      A/P: 57yMale w/ left knee pain s/p reported direct trauma, no fracture, low clinical concern for infection   WBAT  Pain control as needed  Ice, elevation as needed   Pt may follow up outpatient  Discussed with Dr. Ramos

## 2023-10-25 NOTE — CONSULT NOTE ADULT - PROBLEM SELECTOR RECOMMENDATION 2
Management as per stroke team.
Pt. reports h/o trauma; x-ray reviewed, shows "medial knee joint mild to moderate narrowing, joint effusion, no acute fracture or dislocation;" cont. SUBHASH, pain control, PT; f/u Ortho recs

## 2023-10-25 NOTE — CONSULT NOTE ADULT - PROBLEM SELECTOR RECOMMENDATION 4
borderline; asymptomatic; FT4 WNL; likely elevated due to nonthyroidal illness, can repeat TFTs in a few weeks as outpatient

## 2023-10-25 NOTE — CONSULT NOTE ADULT - SUBJECTIVE AND OBJECTIVE BOX
Patient is a 57y old  Male who presents with a chief complaint of DIZZINESS     (23 Oct 2023 13:24)    HPI:  HPI: 57y Male with PMHx of HTN (noncompliant with meds) originally presented to [facility] ED c/o room spinning dizziness x 1 week worse with head movement. CTH with no acute intracranial injury. Microvascular disease. Chronic lacunar infarcts. CTA head and neck with mild dot-appearance to the bilateral ACAs and MCAs that may represent vasculitis. Short severe segmental narrowing of the left proximal A2 segment of the anterior cerebral artery. Severe focal narrowing of the left M2/M3 segment of the middle cerebral artery. Normal CTA of the extracranial circulation. No evidence of carotid stenosis. Was given total of 30mg labetalol and 30mg hydralazine with SBP still 230 and so cardene gtt was started. Given imaging findings, stroke was consulted. Upon initial examination, NIHSS 0. Was awaiting MICU bed for admission under the stroke service for a stroke workup.    A few mins later received call from patient's nurse that patient was now confused and was not speaking as much. BP at the time 130s. Upon my eval, NIHSS 2 for confusion/aphasia (unable to say where he is or the year) and hence stroke code was called. CT imaging unchanged. Exam slowly improving with SBP in the low 200s. Discussed case with Dr. Myles, since exam is improving and no change in imaging, likely symptoms are due to significant drop in BP.   (21 Oct 2023 19:58)    Interval HPI: Pt. c/o L knee swelling and 10/10 pain, reports h/o trauma     Review of Systems: 12 point review of systems otherwise negative    PAST MEDICAL & SURGICAL HISTORY:  HTN (hypertension)    Social History:  non smoker    FAMILY HISTORY:  non contributory    MEDICATIONS  (STANDING):  aspirin enteric coated 81 milliGRAM(s) Oral daily  atorvastatin 80 milliGRAM(s) Oral at bedtime  clopidogrel Tablet 75 milliGRAM(s) Oral daily  enoxaparin Injectable 40 milliGRAM(s) SubCutaneous every 24 hours  influenza   Vaccine 0.5 milliLiter(s) IntraMuscular once  labetalol 100 milliGRAM(s) Oral every 12 hours  lidocaine   4% Patch 1 Patch Transdermal every 24 hours  meclizine 12.5 milliGRAM(s) Oral every 8 hours    MEDICATIONS  (PRN):      Allergies    No Known Allergies    Intolerances          Vital Signs Last 24 Hrs  T(C): 36.7 (25 Oct 2023 13:29), Max: 37.6 (25 Oct 2023 04:18)  T(F): 98.1 (25 Oct 2023 13:29), Max: 99.6 (25 Oct 2023 04:18)  HR: 84 (25 Oct 2023 14:55) (71 - 84)  BP: 167/98 (25 Oct 2023 14:55) (137/86 - 195/115)  BP(mean): 126 (25 Oct 2023 14:55) (105 - 146)  RR: 23 (25 Oct 2023 14:55) (16 - 23)  SpO2: 95% (25 Oct 2023 13:52) (94% - 99%)    Parameters below as of 25 Oct 2023 13:52  Patient On (Oxygen Delivery Method): room air      CAPILLARY BLOOD GLUCOSE          10-24 @ 07:  -  10-25 @ 07:00  --------------------------------------------------------  IN: 0 mL / OUT: 400 mL / NET: -400 mL    10-25 @ 07:01  -  10-25 @ 16:20  --------------------------------------------------------  IN: 0 mL / OUT: 250 mL / NET: -250 mL        Physical Exam:  (earlier today)  Daily     Daily Weight in k.2 (25 Oct 2023 04:18)  General:  comfortable-appearing in NAD  HEENT:  MMM  CV:  RRR  Lungs:  CTA B/L  Abdomen:  soft NT ND  Extremities:  L knee effusion, TTP, warm, no erythema  Skin:  WWP  Neuro:  AAOx3    LABS:                        14.6   8.72  )-----------( 220      ( 25 Oct 2023 05:30 )             42.4     10-25    139  |  106  |  19  ----------------------------<  103<H>  3.7   |  21<L>  |  1.00    Ca    9.3      25 Oct 2023 05:30  Phos  3.1     10-25  Mg     2.0     10-25        Urinalysis Basic - ( 25 Oct 2023 05:30 )    Color: x / Appearance: x / SG: x / pH: x  Gluc: 103 mg/dL / Ketone: x  / Bili: x / Urobili: x   Blood: x / Protein: x / Nitrite: x   Leuk Esterase: x / RBC: x / WBC x   Sq Epi: x / Non Sq Epi: x / Bacteria: x

## 2023-10-26 LAB
ANION GAP SERPL CALC-SCNC: 12 MMOL/L — SIGNIFICANT CHANGE UP (ref 5–17)
ANION GAP SERPL CALC-SCNC: 12 MMOL/L — SIGNIFICANT CHANGE UP (ref 5–17)
BUN SERPL-MCNC: 23 MG/DL — SIGNIFICANT CHANGE UP (ref 7–23)
BUN SERPL-MCNC: 23 MG/DL — SIGNIFICANT CHANGE UP (ref 7–23)
CALCIUM SERPL-MCNC: 9.1 MG/DL — SIGNIFICANT CHANGE UP (ref 8.4–10.5)
CALCIUM SERPL-MCNC: 9.1 MG/DL — SIGNIFICANT CHANGE UP (ref 8.4–10.5)
CHLORIDE SERPL-SCNC: 104 MMOL/L — SIGNIFICANT CHANGE UP (ref 96–108)
CHLORIDE SERPL-SCNC: 104 MMOL/L — SIGNIFICANT CHANGE UP (ref 96–108)
CO2 SERPL-SCNC: 19 MMOL/L — LOW (ref 22–31)
CO2 SERPL-SCNC: 19 MMOL/L — LOW (ref 22–31)
CREAT SERPL-MCNC: 1.02 MG/DL — SIGNIFICANT CHANGE UP (ref 0.5–1.3)
CREAT SERPL-MCNC: 1.02 MG/DL — SIGNIFICANT CHANGE UP (ref 0.5–1.3)
EGFR: 85 ML/MIN/1.73M2 — SIGNIFICANT CHANGE UP
EGFR: 85 ML/MIN/1.73M2 — SIGNIFICANT CHANGE UP
GLUCOSE SERPL-MCNC: 91 MG/DL — SIGNIFICANT CHANGE UP (ref 70–99)
GLUCOSE SERPL-MCNC: 91 MG/DL — SIGNIFICANT CHANGE UP (ref 70–99)
HCT VFR BLD CALC: 41.7 % — SIGNIFICANT CHANGE UP (ref 39–50)
HCT VFR BLD CALC: 41.7 % — SIGNIFICANT CHANGE UP (ref 39–50)
HGB BLD-MCNC: 14.1 G/DL — SIGNIFICANT CHANGE UP (ref 13–17)
HGB BLD-MCNC: 14.1 G/DL — SIGNIFICANT CHANGE UP (ref 13–17)
MAGNESIUM SERPL-MCNC: 2.2 MG/DL — SIGNIFICANT CHANGE UP (ref 1.6–2.6)
MAGNESIUM SERPL-MCNC: 2.2 MG/DL — SIGNIFICANT CHANGE UP (ref 1.6–2.6)
MCHC RBC-ENTMCNC: 32.6 PG — SIGNIFICANT CHANGE UP (ref 27–34)
MCHC RBC-ENTMCNC: 32.6 PG — SIGNIFICANT CHANGE UP (ref 27–34)
MCHC RBC-ENTMCNC: 33.8 GM/DL — SIGNIFICANT CHANGE UP (ref 32–36)
MCHC RBC-ENTMCNC: 33.8 GM/DL — SIGNIFICANT CHANGE UP (ref 32–36)
MCV RBC AUTO: 96.5 FL — SIGNIFICANT CHANGE UP (ref 80–100)
MCV RBC AUTO: 96.5 FL — SIGNIFICANT CHANGE UP (ref 80–100)
NRBC # BLD: 0 /100 WBCS — SIGNIFICANT CHANGE UP (ref 0–0)
NRBC # BLD: 0 /100 WBCS — SIGNIFICANT CHANGE UP (ref 0–0)
PHOSPHATE SERPL-MCNC: 3.4 MG/DL — SIGNIFICANT CHANGE UP (ref 2.5–4.5)
PHOSPHATE SERPL-MCNC: 3.4 MG/DL — SIGNIFICANT CHANGE UP (ref 2.5–4.5)
PLATELET # BLD AUTO: 232 K/UL — SIGNIFICANT CHANGE UP (ref 150–400)
PLATELET # BLD AUTO: 232 K/UL — SIGNIFICANT CHANGE UP (ref 150–400)
POTASSIUM SERPL-MCNC: 4 MMOL/L — SIGNIFICANT CHANGE UP (ref 3.5–5.3)
POTASSIUM SERPL-MCNC: 4 MMOL/L — SIGNIFICANT CHANGE UP (ref 3.5–5.3)
POTASSIUM SERPL-SCNC: 4 MMOL/L — SIGNIFICANT CHANGE UP (ref 3.5–5.3)
POTASSIUM SERPL-SCNC: 4 MMOL/L — SIGNIFICANT CHANGE UP (ref 3.5–5.3)
RBC # BLD: 4.32 M/UL — SIGNIFICANT CHANGE UP (ref 4.2–5.8)
RBC # BLD: 4.32 M/UL — SIGNIFICANT CHANGE UP (ref 4.2–5.8)
RBC # FLD: 12.2 % — SIGNIFICANT CHANGE UP (ref 10.3–14.5)
RBC # FLD: 12.2 % — SIGNIFICANT CHANGE UP (ref 10.3–14.5)
SODIUM SERPL-SCNC: 135 MMOL/L — SIGNIFICANT CHANGE UP (ref 135–145)
SODIUM SERPL-SCNC: 135 MMOL/L — SIGNIFICANT CHANGE UP (ref 135–145)
WBC # BLD: 8.5 K/UL — SIGNIFICANT CHANGE UP (ref 3.8–10.5)
WBC # BLD: 8.5 K/UL — SIGNIFICANT CHANGE UP (ref 3.8–10.5)
WBC # FLD AUTO: 8.5 K/UL — SIGNIFICANT CHANGE UP (ref 3.8–10.5)
WBC # FLD AUTO: 8.5 K/UL — SIGNIFICANT CHANGE UP (ref 3.8–10.5)

## 2023-10-26 PROCEDURE — 99232 SBSQ HOSP IP/OBS MODERATE 35: CPT

## 2023-10-26 PROCEDURE — 93970 EXTREMITY STUDY: CPT | Mod: 26

## 2023-10-26 PROCEDURE — 33285 INSJ SUBQ CAR RHYTHM MNTR: CPT

## 2023-10-26 PROCEDURE — ZZZZZ: CPT

## 2023-10-26 PROCEDURE — 99233 SBSQ HOSP IP/OBS HIGH 50: CPT

## 2023-10-26 RX ADMIN — Medication 12.5 MILLIGRAM(S): at 05:58

## 2023-10-26 RX ADMIN — CLOPIDOGREL BISULFATE 75 MILLIGRAM(S): 75 TABLET, FILM COATED ORAL at 11:01

## 2023-10-26 RX ADMIN — ENOXAPARIN SODIUM 40 MILLIGRAM(S): 100 INJECTION SUBCUTANEOUS at 15:05

## 2023-10-26 RX ADMIN — LIDOCAINE 1 PATCH: 4 CREAM TOPICAL at 18:25

## 2023-10-26 RX ADMIN — LIDOCAINE 1 PATCH: 4 CREAM TOPICAL at 03:05

## 2023-10-26 RX ADMIN — Medication 81 MILLIGRAM(S): at 11:01

## 2023-10-26 RX ADMIN — Medication 100 MILLIGRAM(S): at 22:59

## 2023-10-26 RX ADMIN — ATORVASTATIN CALCIUM 80 MILLIGRAM(S): 80 TABLET, FILM COATED ORAL at 21:28

## 2023-10-26 RX ADMIN — LIDOCAINE 1 PATCH: 4 CREAM TOPICAL at 15:05

## 2023-10-26 RX ADMIN — LISINOPRIL 20 MILLIGRAM(S): 2.5 TABLET ORAL at 05:59

## 2023-10-26 RX ADMIN — Medication 12.5 MILLIGRAM(S): at 22:41

## 2023-10-26 RX ADMIN — Medication 12.5 MILLIGRAM(S): at 15:05

## 2023-10-26 NOTE — PROGRESS NOTE ADULT - ASSESSMENT
57y Male with PMHx of HTN (noncompliant with meds) originally presented to [facility] ED c/o room spinning dizziness x 1 week worse with head movement. CTH with no acute intracranial injury. Microvascular disease. Chronic lacunar infarcts. CTA head and neck with mild dot-appearance to the bilateral ACAs and MCAs that may represent vasculitis. Short severe segmental narrowing of the left proximal A2 segment of the anterior cerebral artery. Severe focal narrowing of the left M2/M3 segment of the middle cerebral artery. Normal CTA of the extracranial circulation. No evidence of carotid stenosis. Was given total of 30mg labetalol and 30mg hydralazine with SBP still 230s and so cardene gtt was started. Given imaging findings, stroke was consulted. Upon initial examination, NIHSS 0. Was awaiting MICU bed for admission under the stroke service for a stroke workup. Patient then became confused/aphasic in setting of SBP 130s. Stroke code was called. CT imaging unchanged. Exam slowly improving with SBP in the low 200s. Discussed case with Dr. Myles, since exam is improving and no change in imaging, likely symptoms are due to significant drop in BP. Admitted to MICU for stroke w/u. MRI brain punctate right periatrial white matter and left centrum semiovale, scattered punctate foci of microhemorrhages. MRA head with ICAD in L>R clinoid segments of ICA, left A2 severe narrowing, moderate left MCA post bifurcation narrowing. S/p cerebral angio 10/24 with multifocal ICAD, no vasculitis. Likely dizziness is due to BPPV, improving with meclizine. TTE with bubble - EF 70-75%, no PFO. JONNY with pulmonary AVM. Pending LE dopplers. Pending ILR. Ortho consulted for L knee joint effusion s/p trauma. Recommend pain control. Stable for step down to regional. Pending AR.    Neuro  #CVA workup  - ASA 81mg + Plavix 75mg for three months followed by ASA 81mg indefinitely per [facility]   - continue atorvastatin 80mg daily  - q8hr stroke neuro checks and vitals  - MRI with punctate foci of acute ischemia within the right periatrial white matter and left centrum semiovale ovale. The SWI images demonstrates multiple scattered punctate foci of   micro hemorrhages. Flair reviewed by Dr. Myles, possible vasculitic lesions.   - MRA head -  ICAD in L>R clinoid segments of ICA, left A2 severe narrowing, moderate left MCA post bifurcation narrowing.    - Stroke Code HCT Results: No acute intracranial injury. Microvascular disease. Chronic lacunar infarcts.  - Stroke Code CTA Results: Mild dot-appearance to the bilateral ACAs and MCAs that may represent vasculitis. Short severe segmental narrowing of the left proximal A2 segment of the anterior cerebral artery, unchanged. Severe focal narrowing of the left M2/M3 segment of the middle cerebral artery, unchanged.  - Stroke education  - rheumatology consulted, appreciate recs: hepatitis panel neg, pending cyclic citrullinated peptide AB.   - vasculitis panel ordered, thus far RF +, EBV titers c/w prior infection   - s/p DOUGLAS 10/24: multifocal ICAD, no evidence of vasculitis     #BPPV - pt endorses dizzines upon positional changes  - c/w meclizine 12.5mg TID, may uptitrate     Cards  #HTN  Does not take BP meds at home  - gradual normotension   - c/w labetalol 100mg BID  - c/w lisinopril 20mg daily  - TTE with bubble - EF 70-75%, no PFO  - JONNY with pulmonary AVM, f/u LE dopplers  - EP consulted for ILR    #HLD  - high dose statin as above in CVA  - LDL results: 156    Pulm  - call provider if SPO2 < 94%    GI  #Nutrition/Fluids/Electrolytes   - replete K<4 and Mg <2  - Diet: dash diet  - IVF: n/a    Endocrine  - A1C results: 5.7  - TSH results: 5.090  - free T4 1.200, T3 105    Ortho  - Reports left knee pain after hitting his knee against the wall 2 days ago.   - on exam, L knee TTP, swollen and warm to touch  - L knee xray: Medial knee joint mild to moderate narrowing, joint effusion, no acute fracture or dislocation.  - ortho consulted, appreciate recs  - lidocaine patch, PRN tramadol 25mg q6h    DVT Prophylaxis  - lovenox sq for DVT prophylaxis    - SCDs for DVT prophylaxis     Dispo: AR     Discussed daily hospital plans and goals with patient.     Discussed with Neurology Attending Dr. Romero      57y Male with PMHx of HTN (noncompliant with meds) originally presented to [facility] ED c/o room spinning dizziness x 1 week worse with head movement. CTH with no acute intracranial injury. Microvascular disease. Chronic lacunar infarcts. CTA head and neck with mild dot-appearance to the bilateral ACAs and MCAs that may represent vasculitis. Short severe segmental narrowing of the left proximal A2 segment of the anterior cerebral artery. Severe focal narrowing of the left M2/M3 segment of the middle cerebral artery. Normal CTA of the extracranial circulation. No evidence of carotid stenosis. Was given total of 30mg labetalol and 30mg hydralazine with SBP still 230s and so cardene gtt was started. Given imaging findings, stroke was consulted. Upon initial examination, NIHSS 0. Was awaiting MICU bed for admission under the stroke service for a stroke workup. Patient then became confused/aphasic in setting of SBP 130s. Stroke code was called. CT imaging unchanged. Exam slowly improving with SBP in the low 200s. Discussed case with Dr. Myles, since exam is improving and no change in imaging, likely symptoms are due to significant drop in BP. Admitted to MICU for stroke w/u. MRI brain punctate right periatrial white matter and left centrum semiovale, scattered punctate foci of microhemorrhages. MRA head with ICAD in L>R clinoid segments of ICA, left A2 severe narrowing, moderate left MCA post bifurcation narrowing. S/p cerebral angio 10/24 with multifocal ICAD, no vasculitis. Likely dizziness is due to BPPV, improving with meclizine. TTE with bubble - EF 70-75%, no PFO. JONNY with pulmonary AVM. Pending LE dopplers. ILR placed 10/26. Ortho consulted for L knee joint effusion s/p trauma. Recommend pain control. Stable for step down to regional. Pending AR.    Neuro  #CVA workup  - ASA 81mg + Plavix 75mg for three months followed by ASA 81mg indefinitely per [facility]   - continue atorvastatin 80mg daily  - q8hr stroke neuro checks and vitals  - MRI with punctate foci of acute ischemia within the right periatrial white matter and left centrum semiovale ovale. The SWI images demonstrates multiple scattered punctate foci of   micro hemorrhages. Flair reviewed by Dr. Myles, possible vasculitic lesions.   - MRA head -  ICAD in L>R clinoid segments of ICA, left A2 severe narrowing, moderate left MCA post bifurcation narrowing.    - Stroke Code HCT Results: No acute intracranial injury. Microvascular disease. Chronic lacunar infarcts.  - Stroke Code CTA Results: Mild dot-appearance to the bilateral ACAs and MCAs that may represent vasculitis. Short severe segmental narrowing of the left proximal A2 segment of the anterior cerebral artery, unchanged. Severe focal narrowing of the left M2/M3 segment of the middle cerebral artery, unchanged.  - Stroke education  - rheumatology consulted, appreciate recs: hepatitis panel neg, pending cyclic citrullinated peptide AB.   - vasculitis panel ordered, thus far RF +, EBV titers c/w prior infection   - s/p DOUGLAS 10/24: multifocal ICAD, no evidence of vasculitis     #BPPV - pt endorses dizzines upon positional changes  - c/w meclizine 12.5mg TID, may uptitrate     Cards  #HTN  Does not take BP meds at home  - gradual normotension   - c/w labetalol 100mg BID  - c/w lisinopril 20mg daily  - TTE with bubble - EF 70-75%, no PFO  - JONNY with pulmonary AVM, f/u LE dopplers  - ILR placed 10/26    #HLD  - high dose statin as above in CVA  - LDL results: 156    Pulm  - call provider if SPO2 < 94%    GI  #Nutrition/Fluids/Electrolytes   - replete K<4 and Mg <2  - Diet: dash diet  - IVF: n/a    Endocrine  - A1C results: 5.7  - TSH results: 5.090  - free T4 1.200, T3 105    Ortho  - Reports left knee pain after hitting his knee against the wall 2 days ago.   - on exam, L knee TTP, swollen and warm to touch  - L knee xray: Medial knee joint mild to moderate narrowing, joint effusion, no acute fracture or dislocation.  - ortho consulted, appreciate recs  - lidocaine patch, PRN tramadol 25mg q6h    DVT Prophylaxis  - lovenox sq for DVT prophylaxis    - SCDs for DVT prophylaxis     Dispo: AR     Discussed daily hospital plans and goals with patient.     Discussed with Neurology Attending Dr. Romero      57y Male with PMHx of HTN (noncompliant with meds) originally presented to [facility] ED c/o room spinning dizziness x 1 week worse with head movement. CTH with no acute intracranial injury. Microvascular disease. Chronic lacunar infarcts. CTA head and neck with mild dot-appearance to the bilateral ACAs and MCAs that may represent vasculitis. Short severe segmental narrowing of the left proximal A2 segment of the anterior cerebral artery. Severe focal narrowing of the left M2/M3 segment of the middle cerebral artery. Normal CTA of the extracranial circulation. No evidence of carotid stenosis. Was given total of 30mg labetalol and 30mg hydralazine with SBP still 230s and so cardene gtt was started. Given imaging findings, stroke was consulted. Upon initial examination, NIHSS 0. Was awaiting MICU bed for admission under the stroke service for a stroke workup. Patient then became confused/aphasic in setting of SBP 130s. Stroke code was called. CT imaging unchanged. Exam slowly improving with SBP in the low 200s. Discussed case with Dr. Myles, since exam is improving and no change in imaging, likely symptoms are due to significant drop in BP. Admitted to MICU for stroke w/u. MRI brain punctate right periatrial white matter and left centrum semiovale, scattered punctate foci of microhemorrhages. MRA head with ICAD in L>R clinoid segments of ICA, left A2 severe narrowing, moderate left MCA post bifurcation narrowing. S/p cerebral angio 10/24 with multifocal ICAD, no vasculitis. Likely dizziness is due to BPPV, improving with meclizine. TTE with bubble - EF 70-75%, no PFO. JONNY with pulmonary AVM. Pending LE dopplers. ILR placed 10/26. Ortho consulted for L knee joint effusion s/p trauma. Recommend pain control. Stable for step down to regional. Pending AR.    Neuro  #CVA workup  - ASA 81mg + Plavix 75mg for three months followed by ASA 81mg indefinitely per [facility]   - continue atorvastatin 80mg daily  - q8hr stroke neuro checks and vitals  - MRI with punctate foci of acute ischemia within the right periatrial white matter and left centrum semiovale ovale. The SWI images demonstrates multiple scattered punctate foci of   micro hemorrhages. Flair reviewed by Dr. Myles, possible vasculitic lesions.   - MRA head -  ICAD in L>R clinoid segments of ICA, left A2 severe narrowing, moderate left MCA post bifurcation narrowing.    - Stroke Code HCT Results: No acute intracranial injury. Microvascular disease. Chronic lacunar infarcts.  - Stroke Code CTA Results: Mild dot-appearance to the bilateral ACAs and MCAs that may represent vasculitis. Short severe segmental narrowing of the left proximal A2 segment of the anterior cerebral artery, unchanged. Severe focal narrowing of the left M2/M3 segment of the middle cerebral artery, unchanged.  - Stroke education  - rheumatology consulted, appreciate recs: hepatitis panel neg, pending cyclic citrullinated peptide AB.   - vasculitis panel ordered, thus far RF +, EBV titers c/w prior infection   - s/p DOUGLAS 10/24: multifocal ICAD, no evidence of vasculitis     #BPPV - pt endorses dizzines upon positional changes  - c/w meclizine 12.5mg TID, may uptitrate     Cards  #HTN  Does not take BP meds at home  - gradual normotension   - c/w labetalol 100mg BID  - c/w lisinopril 20mg daily  - TTE with bubble - EF 70-75%, no PFO  - JONNY with pulmonary AVM, f/u LE dopplers  - ILR placed 10/26    #HLD  - high dose statin as above in CVA  - LDL results: 156    Pulm  - call provider if SPO2 < 94%    GI  #Nutrition/Fluids/Electrolytes   - replete K<4 and Mg <2  - Diet: dash diet  - IVF: n/a    Endocrine  - A1C results: 5.7  - TSH results: 5.090  - free T4 1.200, T3 105    Ortho  - Reports left knee pain after hitting his knee against the wall 2 days ago.   - on exam, L knee TTP, swollen and warm to touch  - L knee xray: Medial knee joint mild to moderate narrowing, joint effusion, no acute fracture or dislocation.  - ortho consulted, appreciate recs  - lidocaine patch, PRN tramadol 25mg q6h    DVT Prophylaxis  - lovenox sq for DVT prophylaxis    - SCDs for DVT prophylaxis     Dispo: AR, able to perform 3 hours of physical therapy     Discussed daily hospital plans and goals with patient.     Discussed with Neurology Attending Dr. Romero

## 2023-10-26 NOTE — PROGRESS NOTE ADULT - ASSESSMENT
Neurology    57 y o Male with PMHx of HTN (noncompliant with meds) originally presented to [facility] ED c/o room spinning dizziness x 1 week worse with head movement. CTH with no acute intracranial injury. Microvascular disease. Chronic lacunar infarcts. CTA head and neck with mild dot-appearance to the bilateral ACAs and MCAs that may represent vasculitis. Short severe segmental narrowing of the left proximal A2 segment of the anterior cerebral artery. Severe focal narrowing of the left M2/M3 segment of the middle cerebral artery. Normal CTA of the extracranial circulation. No evidence of carotid stenosis. Was given total of 30mg labetalol and 30mg hydralazine with SBP still 230s and so cardene gtt was started. Given imaging findings, stroke was consulted. Upon initial examination, NIHSS 0. Was awaiting MICU bed for admission under the stroke service for a stroke workup. Patient then became confused/aphasic in setting of SBP 130s. Stroke code was called. CT imaging unchanged. Exam slowly improving with SBP in the low 200s. Discussed case with Dr. Myles, since exam is improving and no change in imaging, likely symptoms are due to significant drop in BP. Admitted to MICU for stroke w/u. MRI brain punctate right periatrial white matter and left centrum semiovale, scattered punctate foci of microhemorrhages. MRA head with ICAD in L>R clinoid segments of ICA, left A2 severe narrowing, moderate left MCA post bifurcation narrowing. S/p cerebral angio 10/24 with multifocal ICAD, no vasculitis. Likely dizziness is due to BPPV, improving with meclizine. TTE with bubble - EF 70-75%, no PFO. JONNY with pulmonary AVM. Pending LE dopplers. Pending ILR. Ortho consulted for L knee joint effusion s/p trauma. Recommend pain control. Stable for step down to regional. Pending AR.    Neuro  #CVA workup  - ASA 81mg + Plavix 75mg for three months followed by ASA 81mg indefinitely per [facility]   - continue atorvastatin 80mg daily  - q8hr stroke neuro checks and vitals  - MRI with punctate foci of acute ischemia within the right periatrial white matter and left centrum semiovale ovale. The SWI images demonstrates multiple scattered punctate foci of   micro hemorrhages. Flair reviewed by Dr. Myles, possible vasculitic lesions.   - MRA head -  ICAD in L>R clinoid segments of ICA, left A2 severe narrowing, moderate left MCA post bifurcation narrowing.    - Stroke Code HCT Results: No acute intracranial injury. Microvascular disease. Chronic lacunar infarcts.  - Stroke Code CTA Results: Mild dot-appearance to the bilateral ACAs and MCAs that may represent vasculitis. Short severe segmental narrowing of the left proximal A2 segment of the anterior cerebral artery, unchanged. Severe focal narrowing of the left M2/M3 segment of the middle cerebral artery, unchanged.  - Stroke education  - rheumatology consulted, appreciate recs: hepatitis panel neg, pending cyclic citrullinated peptide AB.   - vasculitis panel ordered, thus far RF +, EBV titers c/w prior infection   - s/p DOUGLAS 10/24: multifocal ICAD, no evidence of vasculitis     #BPPV - pt endorses dizzines upon positional changes  - c/w meclizine 12.5mg TID, may uptitrate     Cards  #HTN  Does not take BP meds at home  - gradual normotension   - c/w labetalol 100mg BID  - c/w lisinopril 20mg daily  - TTE with bubble - EF 70-75%, no PFO  - JONNY with pulmonary AVM, f/u LE dopplers  - EP consulted for ILR    #HLD  - high dose statin as above in CVA  - LDL results: 156    Pulm  - call provider if SPO2 < 94%    GI  #Nutrition/Fluids/Electrolytes   - replete K<4 and Mg <2  - Diet: dash diet  - IVF: n/a    Endocrine  - A1C results: 5.7  - TSH results: 5.090  - free T4 1.200, T3 105    Ortho  - Reports left knee pain after hitting his knee against the wall 2 days ago.   - on exam, L knee TTP, swollen and warm to touch  - L knee xray: Medial knee joint mild to moderate narrowing, joint effusion, no acute fracture or dislocation.  - ortho consulted, appreciate recs  - lidocaine patch, PRN tramadol 25mg q6h    DVT Prophylaxis  - lovenox sq for DVT prophylaxis    - SCDs for DVT prophylaxis     Dispo: AR     Discussed daily hospital plans and goals with patient.     Discussed with Neurology Attending Dr. Romero

## 2023-10-26 NOTE — DISCHARGE NOTE PROVIDER - NSDCCPCAREPLAN_GEN_ALL_CORE_FT
PRINCIPAL DISCHARGE DIAGNOSIS  Diagnosis: Stroke  Assessment and Plan of Treatment: During this hospital admission, you had an ischemic stroke. During an ischemic stroke, blood stops flowing to part of your brain because of a blockage in the blood vessel. This can damage areas in the brain that control other parts of the body.  Please take your aspirin and plavix for blood thinning for 90 days (until 1/21/24) and then only aspirin. Continue to take Atorvastatin for cholesterol medication/blood vessel protection as prescribed to prevent further strokes. Do not skip doses and do not run low on your medication. If you run low on your medication, please contact your doctor.  You will follow up outpatient with the stroke Nurse Practitioner.  Doing your regular tasks may be difficult after you've had a stroke, but you can learn new ways to manage your daily activities. In fact, doing daily activities may help you to regain muscle strength. Be patient, give yourself time to adjust, and appreciate the progress you make. For example, when showering or bathing, test the water temperature with a hand or foot that was not affected by the stroke, use grab bars, a shower seat, a hand-held showerhead, etc. It is normal to feel fatigue after a stroke, while some days may be worse than others, you will continue to improve.  Call 911 right away if you have any of the following symptoms of another stroke:  B: Balance: Sudden: Dizziness, loss of balance, or a sense of falling, difficulty with coordinating movement  E: Eyes: Sudden double vision or trouble seeing in one or both eyes  F: Face: Sudden uneven face  A: Arms (Legs): Sudden weakness, tingling, or loss of feeling on one side of your face or body  S: Speech: Sudden trouble talking or slurred speech, sudden difficulty understanding others  T: Time: Please call 911 right away and go to the emergency room  •Sudden, severe headache  •Blackouts or seizures       PRINCIPAL DISCHARGE DIAGNOSIS  Diagnosis: Stroke  Assessment and Plan of Treatment: During this hospital admission, you had an ischemic stroke. During an ischemic stroke, blood stops flowing to part of your brain because of a blockage in the blood vessel. This can damage areas in the brain that control other parts of the body.  Please take your aspirin and plavix for blood thinning for 90 days (until 1/21/24) and then only aspirin. Continue to take Atorvastatin for cholesterol medication/blood vessel protection as prescribed to prevent further strokes. Do not skip doses and do not run low on your medication. If you run low on your medication, please contact your doctor.  You will follow up outpatient with the stroke Nurse Practitioner.  Doing your regular tasks may be difficult after you've had a stroke, but you can learn new ways to manage your daily activities. In fact, doing daily activities may help you to regain muscle strength. Be patient, give yourself time to adjust, and appreciate the progress you make. For example, when showering or bathing, test the water temperature with a hand or foot that was not affected by the stroke, use grab bars, a shower seat, a hand-held showerhead, etc. It is normal to feel fatigue after a stroke, while some days may be worse than others, you will continue to improve.  Call 911 right away if you have any of the following symptoms of another stroke:  B: Balance: Sudden: Dizziness, loss of balance, or a sense of falling, difficulty with coordinating movement  E: Eyes: Sudden double vision or trouble seeing in one or both eyes  F: Face: Sudden uneven face  A: Arms (Legs): Sudden weakness, tingling, or loss of feeling on one side of your face or body  S: Speech: Sudden trouble talking or slurred speech, sudden difficulty understanding others  T: Time: Please call 911 right away and go to the emergency room  •Sudden, severe headache  •Blackouts or seizures  ----  - Please follow up with Татьяна Shipley of NEUROLOGY on 11/17/2023, at 10AM

## 2023-10-26 NOTE — DISCHARGE NOTE PROVIDER - CARE PROVIDER_API CALL
Hina Jimenez  Neurology  [address] [address]  Phone: [phone]  Fax: [phone]  Follow Up Time:     Billy Everett J  Pulmonary Disease  [address], [address] [address]  Phone: [phone]  Fax: [phone]  Follow Up Time:    Hina Jimenez  Neurology  [address] [address]  Phone: [phone]  Fax: [phone]  Follow Up Time:     Billy Everett J  Pulmonary Disease  [address], [address]  [place] [address]  Phone: [phone]  Fax: [phone]  Follow Up Time:     Татьяна Shipley  Neurology  [address], [place] [address]  Phone: [phone]  Fax: (   )    -  Scheduled Appointment: 11/17/2023 10:00 PM

## 2023-10-26 NOTE — DISCHARGE NOTE PROVIDER - PROVIDER TOKENS
PROVIDER:[TOKEN:[[id number]]],PROVIDER:[TOKEN:[[id number]]] PROVIDER:[TOKEN:[[id number]]],PROVIDER:[TOKEN:[[id number]]],FREE:[LAST:[Eclindas],FIRST:[Татьяна],PHONE:[phone]],FAX:[(   [phone]    -],ADDRESS:[[address], [facility] [address], [place] [address]],SCHEDULEDAPPT:[11/17/2023],SCHEDULEDAPPTTIME:[10:00 PM]]

## 2023-10-26 NOTE — DISCHARGE NOTE PROVIDER - HOSPITAL COURSE
Hospital course:  57y Male with PMH of HTN (noncompliant with meds) originally presented to [facility] ED c/o room spinning dizziness x 1 week worse with head movement. CTH with no acute intracranial injury. Microvascular disease. Chronic lacunar infarcts. CTA head and neck with mild dot-appearance to the bilateral ACAs and MCAs concerning for vasculitis. Short severe segmental narrowing of the left proximal A2 segment of the anterior cerebral artery. Severe focal narrowing of the left M2/M3 segment of the middle cerebral artery. Normal CTA of the extracranial circulation. No evidence of carotid stenosis. Given imaging findings, stroke was consulted. Upon initial examination, NIHSS 0. Found to be persistently hypertensive to the 230's and started on a nicardipine drip to maintain SBP < 180. Was awaiting MICU, when patent became confused/aphasic in setting of SBP 130s. Stroke code was called. CT imaging unchanged. Exam slowly improving with SBP in the low 200s. Likely symptoms were due to significant drop in BP. MRI brain punctate right periatrial white matter and left centrum semiovale, scattered punctate foci of microhemorrhages. MRA head with ICAD in L>R clinoid segments of ICA, left A2 severe narrowing, moderate left MCA post bifurcation narrowing. S/p cerebral angio 10/24 with multifocal ICAD without classic beading appearance that suggest vasculitis. ESR/CRP WNL, RF mildly elevated at 20.  More likely that presenting symptoms etiology secondary to BPPV.  Incidental bilateral cerebral infarction in setting of multifocal intracranial stenosis -- mechanism of stroke likely intracranial large artery atherosclerotic disease. Less likely vasculitis given DSA findings and clinical history. ILR placed by EP on ***.         Discharge NIHSS:     During this hospital course, patient had a ischemic stroke located in right periatrial white matter and left centrum semiovale ovale as seen on MRI.   The stroke etiology is likely secondary to:    [X]other: Large vessel disease from atherosclerotic risk factors  [X]etiology workup still in progress    Patient had the following workup done in house:  CT Head: No acute intracranial injury. Microvascular disease. Chronic lacunar infarcts.  MR Head Non Contrast: Punctate foci of acute ischemia within the right periatrial white matter and left centrum semiovale ovale.  MR Angio Head: ANTERIOR CIRCULATION:   Intracranial atherosclerosis cavernous and clinoid segments of the internal carotid arteries, mild on the right and mild-to-moderate on the left; left anterior cerebral arterial A2 segment, severe; and left MCA post bifurcation segments, moderate. POSTERIOR CIRCULATION:  Intact.  CT Angio Head: Mild dot-appearance to the bilateral ACAs and MCAs that may represent vasculitis. Short severe segmental narrowing of the left proximal A2 segment of the anterior cerebral artery. Severe focal narrowing of the left M2/M3 segment of the middle cerebral   artery.  CT Angio Neck: Normal CTA of the extracranial circulation.  [X]echo: Normal LV systolic function. Moderate symmetric LVH. No PFO.   [X] JONNY: Normal LV size and function. No LA/RA/SUSAN/RAA thrombus seen. No PFO. Pulmonary AVM. No significant valvular disease.   [X]labs  LDL: 156  A1C: 5.7  TSH: 5.09  []other    Physical exam at discharge:    New medications on discharge: Aspirin 81mg daily, plavix 75mg daily, lisinopril 20mg daily, labetalol 100mg BID, atorvastatin 80mg   Labs to be followed up:   Imaging to be done as outpatient:  Further outpatient workup:   57y Male with PMHx of HTN (noncompliant with meds) originally presented to [facility] ED c/o room spinning dizziness x 1 week worse with head movement. CTA imaging with dot-appearance along b/l EVERARDO and MCA suspicious for vasculitis. C/c/b hypertension requiring cardenge gtt and AMS with aphasia after significant drop in BP. MRI with right periatrial white matter and left centrum semiovale infarct and scattered microhemorrhages. Diagnostic angio 10/24 with ICAD, no beading appearance that would suggest vasculitis. In addition, ESR/CRP WNL, RF mildly elevated (20). Presenting symptoms of dizziness likely secondary to peripheral etiology (BPPV) with incidental b/l infarct i/s/o ICAD. S/p ILR 10/26/23. Plan for DAPT x 90 days then aspirin monotherapy, statin.    Discharge NIHSS:     During this hospital course, patient had a ischemic stroke located in right periatrial white matter and left centrum semiovale ovale as seen on MRI.   The stroke etiology is likely secondary to:    [X]other: Large vessel disease from atherosclerotic risk factors  [X]etiology workup still in progress    Patient had the following workup done in house:  CT Head: No acute intracranial injury. Microvascular disease. Chronic lacunar infarcts.  MR Head Non Contrast: Punctate foci of acute ischemia within the right periatrial white matter and left centrum semiovale ovale.  MR Angio Head: ANTERIOR CIRCULATION:   Intracranial atherosclerosis cavernous and clinoid segments of the internal carotid arteries, mild on the right and mild-to-moderate on the left; left anterior cerebral arterial A2 segment, severe; and left MCA post bifurcation segments, moderate. POSTERIOR CIRCULATION:  Intact.  CT Angio Head: Mild dot-appearance to the bilateral ACAs and MCAs that may represent vasculitis. Short severe segmental narrowing of the left proximal A2 segment of the anterior cerebral artery. Severe focal narrowing of the left M2/M3 segment of the middle cerebral   artery.  CT Angio Neck: Normal CTA of the extracranial circulation.  [X]echo: Normal LV systolic function. Moderate symmetric LVH. No PFO.   [X] JONNY: Normal LV size and function. No LA/RA/SUSAN/RAA thrombus seen. No PFO. Pulmonary AVM. No significant valvular disease.   [X]labs  LDL: 156  A1C: 5.7  TSH: 5.09  []other    Physical exam at discharge:    New medications on discharge: Aspirin 81mg daily, plavix 75mg daily, lisinopril 20mg daily, labetalol 100mg BID, atorvastatin 80mg   Labs to be followed up:   Imaging to be done as outpatient:  Further outpatient workup:   57y Male with PMHx of HTN (noncompliant with meds) originally presented to [facility] ED c/o room spinning dizziness x 1 week worse with head movement. CTH was negative. CTA imaging with dot-appearance along b/l EVERARDO and MCA suspicious for vasculitis. C/c/b hypertension requiring cardenge gtt and AMS with aphasia after significant drop in BP. MRI with right periatrial white matter and left centrum semiovale infarct and scattered microhemorrhages. Diagnostic angio 10/24 with ICAD, no beading appearance that would suggest vasculitis. In addition, ESR/CRP WNL, RF mildly elevated (20). Presenting symptoms of dizziness likely secondary to peripheral etiology (BPPV) with incidental b/l infarct i/s/o ICAD. S/p ILR 10/26/23. Plan for DAPT x 90 days followed by ASA monotherapy and statin. Will follow up with pulmonology outpatient for AVM seen on JONNY and neurology.     During this hospital course, patient had a ischemic stroke located in right periatrial white matter and left centrum semiovale ovale as seen on MRI.   The stroke etiology is likely secondary to:  [X]other: Large vessel disease from atherosclerotic risk factors  [X]etiology workup still in progress    Patient had the following workup done in house:  CT Head: No acute intracranial injury. Microvascular disease. Chronic lacunar infarcts.  MR Head Non Contrast: Punctate foci of acute ischemia within the right periatrial white matter and left centrum semiovale ovale.  MR Angio Head: ANTERIOR CIRCULATION:   Intracranial atherosclerosis cavernous and clinoid segments of the internal carotid arteries, mild on the right and mild-to-moderate on the left; left anterior cerebral arterial A2 segment, severe; and left MCA post bifurcation segments, moderate. POSTERIOR CIRCULATION:  Intact.  CT Angio Head: Mild dot-appearance to the bilateral ACAs and MCAs that may represent vasculitis. Short severe segmental narrowing of the left proximal A2 segment of the anterior cerebral artery. Severe focal narrowing of the left M2/M3 segment of the middle cerebral   artery.  CT Angio Neck: Normal CTA of the extracranial circulation.  [X]echo: Normal LV systolic function. Moderate symmetric LVH. No PFO.   [X] JONNY: Normal LV size and function. No LA/RA/SUSAN/RAA thrombus seen. No PFO. Pulmonary AVM. No significant valvular disease.   [X]labs  LDL: 156  A1C: 5.7  TSH: 5.09  Utox: negative     Physical exam at discharge:    Discharge NIHSS:    New medications on discharge: Aspirin 81mg daily, plavix 75mg daily, lisinopril 20mg daily, labetalol 100mg BID, atorvastatin 80mg   Labs to be followed up:   Imaging to be done as outpatient:  Further outpatient workup: outpatient neuro and pulmonology follow up    57y Male with PMHx of HTN (noncompliant with meds) originally presented to [facility] ED c/o room spinning dizziness x 1 week worse with head movement. CTH was negative. CTA imaging with dot-appearance along b/l EVERARDO and MCA suspicious for vasculitis. C/c/b hypertension requiring cardenge gtt and AMS with aphasia after significant drop in BP. MRI with right periatrial white matter and left centrum semiovale infarct and scattered microhemorrhages. Diagnostic angio 10/24 with ICAD, no beading appearance that would suggest vasculitis. In addition, ESR/CRP WNL, RF mildly elevated (20). Presenting symptoms of dizziness likely secondary to peripheral etiology (BPPV) with incidental b/l infarct i/s/o ICAD. S/p ILR 10/26/23. Plan for DAPT x 90 days followed by ASA monotherapy and statin. Will follow up with pulmonology outpatient for AVM seen on JONNY and neurology. Started on 30mg nifedipine and 20mg lisinopril, will need to gradually adjust BP meds at rehab.    During this hospital course, patient had a ischemic stroke located in right periatrial white matter and left centrum semiovale ovale as seen on MRI.   The stroke etiology is likely secondary to:  [X]other: Large vessel disease from atherosclerotic risk factors  [X]etiology workup still in progress  [x]etiology unknown, cardioembolic favored though could be related to ICAD due to vascular disease    Patient had the following workup done in house:  CT Head: No acute intracranial injury. Microvascular disease. Chronic lacunar infarcts.  MR Head Non Contrast: Punctate foci of acute ischemia within the right periatrial white matter and left centrum semiovale ovale.  MR Angio Head: ANTERIOR CIRCULATION:   Intracranial atherosclerosis cavernous and clinoid segments of the internal carotid arteries, mild on the right and mild-to-moderate on the left; left anterior cerebral arterial A2 segment, severe; and left MCA post bifurcation segments, moderate. POSTERIOR CIRCULATION:  Intact.  CT Angio Head: Mild dot-appearance to the bilateral ACAs and MCAs that may represent vasculitis. Short severe segmental narrowing of the left proximal A2 segment of the anterior cerebral artery. Severe focal narrowing of the left M2/M3 segment of the middle cerebral artery.  CT Angio Neck: Normal CTA of the extracranial circulation.  [X]echo: Normal LV systolic function. Moderate symmetric LVH. No PFO.   [X] JONNY: Normal LV size and function. No LA/RA/SUSAN/RAA thrombus seen. No PFO. Pulmonary AVM. No significant valvular disease.   [X]labs  LDL: 156  A1C: 5.7  TSH: 5.09  Utox: negative     Physical exam at discharge:  MS: Oriented x3. Recent and remote recall intact. Fluent. Follows crossed commands. Patient able to verbalize past history.   CN: VFF. EOMI. V1-V3 intact. Face symmetric. T/u midline. Should shrug intact bilaterally.   Motor: Normal tone. Full strength throughout.   Sensory: Intact to LT and PP throughout   Reflexes: 2+ throughout. Babinski absent bilaterally.   Coordination: No dysmetria on FNF or ataxia on HTS bilaterally   Gait: Normal     Discharge NIHSS: 0    New medications on discharge: Aspirin 81mg daily, plavix 75mg daily, lisinopril 20mg daily, nifedipine 30mg, atorvastatin 80mg   Further outpatient workup: outpatient neuro and pulmonology follow up, hypercoags    57y Male with PMHx of HTN (noncompliant with meds) originally presented to [facility] ED c/o room spinning dizziness x 1 week worse with head movement. CTH was negative. CTA imaging with dot-appearance along b/l EVERARDO and MCA suspicious for vasculitis. C/c/b hypertension requiring cardenge gtt and AMS with aphasia after significant drop in BP. MRI with right periatrial white matter and left centrum semiovale infarct and scattered microhemorrhages. Diagnostic angio 10/24 with ICAD, no beading appearance that would suggest vasculitis. In addition, ESR/CRP WNL, RF mildly elevated (20). Presenting symptoms of dizziness likely secondary to peripheral etiology (BPPV) with incidental b/l infarct i/s/o ICAD. S/p ILR 10/26/23. Plan for DAPT x 90 days followed by ASA monotherapy and statin. Course complicated by increase in LFT's with unknown etiology, RUQ US negative. Statin dose decreased to 40mg and follow up with either PCP or Hepatology.     During this hospital course, patient had a ischemic stroke located in right periatrial white matter and left centrum semiovale ovale as seen on MRI.   The stroke etiology is likely secondary to:  [X]other: Large vessel disease from atherosclerotic risk factors  [X]etiology workup still in progress  [x]etiology unknown, cardioembolic favored though could be related to ICAD due to vascular disease    Patient had the following workup done in house:  CT Head: No acute intracranial injury. Microvascular disease. Chronic lacunar infarcts.  MR Head Non Contrast: Punctate foci of acute ischemia within the right periatrial white matter and left centrum semiovale ovale.  MR Angio Head: ANTERIOR CIRCULATION:   Intracranial atherosclerosis cavernous and clinoid segments of the internal carotid arteries, mild on the right and mild-to-moderate on the left; left anterior cerebral arterial A2 segment, severe; and left MCA post bifurcation segments, moderate. POSTERIOR CIRCULATION:  Intact.  CT Angio Head: Mild dot-appearance to the bilateral ACAs and MCAs that may represent vasculitis. Short severe segmental narrowing of the left proximal A2 segment of the anterior cerebral artery. Severe focal narrowing of the left M2/M3 segment of the middle cerebral artery.  CT Angio Neck: Normal CTA of the extracranial circulation.  [X]echo: Normal LV systolic function. Moderate symmetric LVH. No PFO.   [X] JONNY: Normal LV size and function. No LA/RA/SUSAN/RAA thrombus seen. No PFO. Pulmonary AVM. No significant valvular disease.   [X]labs  LDL: 156  A1C: 5.7  TSH: 5.09  Utox: negative     Physical exam at discharge:  MS: Oriented x3. Recent and remote recall intact. Fluent. Follows crossed commands. Patient able to verbalize past history.   CN: VFF. EOMI. V1-V3 intact. Face symmetric. T/u midline. Should shrug intact bilaterally.   Motor: Normal tone. Full strength throughout.   Sensory: Intact to LT and PP throughout   Reflexes: 2+ throughout. Babinski absent bilaterally.   Coordination: No dysmetria on FNF or ataxia on HTS bilaterally   Gait: Normal     Discharge NIHSS: 0    New medications on discharge: Aspirin 81mg daily, plavix 75mg daily, lisinopril 20mg daily, nifedipine 30mg, atorvastatin 80mg   Further outpatient workup: outpatient neuro and pulmonology follow up, hypercoags

## 2023-10-26 NOTE — PROGRESS NOTE ADULT - NS ATTEND AMEND GEN_ALL_CORE FT
57 year old man w/ PMH of HTN (non compliant) presented w/ intermittent roomspinning dizziness. On exam, patient w/ unidirectional R. beating nystagmus on horizontal and vertical gaze, otherwise normal.     CTH negative  CTA h/n demonstrated multifocal ICAD, including severe stenosis of the L. A2, severe stenosis of the L. M2/M3, L > R. supraclinoid ICA stenosis.   CTP demonstrates Tmax >6s delay of 53cc in both hemispheres  MR brain demonstrates punctate infarction involving the R. white matter and L. centrum semiovale. Multiple chronic lacunar infarctions; Multiple +SWI microhemorrhages involving both cerebellar hemispheres, and bihemispheric.   A1c 5.7     ESR wnl; CRP wnl   RF 20 (mildly elevated)   Utox negative  TTE EF wnl; Moderate LVH; Normal LA; No PFO.   DSA +multifocal ICAD but without classic beading appearance that would suggest vasculitis.   JONNY +pulmonary AVM    Impression:   1. Intermittent vertigo exacerbated by head movements w/ unidirectional right-beating nystagmus -- etiology likely secondary to BPPV.   2. Incidental bilateral cerebral infarction in setting of multifocal intracranial stenosis -- mechanism of stroke likely intracranial large artery atherosclerotic disease. Less likely vasculitis given DSA findings and clinical history.     Plan:   ASA 81mg + Plavix 75mg for three months followed by ASA 81mg indefinitely per [facility]   Gradual normotension   LE US   ILR   Lovenox SQ for DVT ppx   PT - AR  Pulmonary follow up outpatient given pulmonary AVM     40 minutes spent on total encounter. The necessity of the time spent during the encounter on this date of service was due to:     Review of imaging and chart; obtaining history; examination of pt; discussion and coordination of care, and discussion of lifestyle modification and risk factor control.

## 2023-10-26 NOTE — DISCHARGE NOTE PROVIDER - NSDCHHHOMEBOUNDOTHER_GEN_ALL_CORE_FT
Pt with deficits in strength, balance, postural awareness. Pt requires skilled PT to improve transfers, amb, stairs.

## 2023-10-26 NOTE — PROGRESS NOTE ADULT - SUBJECTIVE AND OBJECTIVE BOX
Procedure- ILR implant  Location- Bedside, [facility]    The rationale for insertion of an implantable loop recorder was discussed with the patient in detail.  The risks of infection, bleeding, pocket hematoma and pain were discussed.  Vendor presence for technical support was also discussed.   Alternatives were reviewed and all questions were answered.  The patient agrees to proceed, and informed consent was signed and placed in the chart.  The patient was in a non-fasting state.    Cloroprep was used to clean procedure site (L chest) and patient was draped in a sterile manner.  The procedure was performed under local anesthetic only.  The site was infiltrated with 1% Lidocaine with 0.001% Epi.  A small incision was made with a specialized scalpel in the region of the fourth intercostal space along the left sternal border.  Using the ILR insertion tool the ILR was “injected” in the subcutaneous plane and deployed (please see CCW report for model number) in the region of optimal R-wave sensing.  Hemostasis was achieved and the wound was closed in a running fashion using 4-0vicryl suture.  Skin was closed using Dermabond.  Tegaderm was placed over incision.    Complications- None.        Plan of Care-   -	Patient to keep Tegaderm on for 24 hours and then should be removed by the patient.    -	Patient should follow up with EP in 4-6 weeks (call [phone] for an appointment)

## 2023-10-26 NOTE — PROGRESS NOTE ADULT - PROBLEM SELECTOR PLAN 4
borderline; asymptomatic; FT4 WNL; likely elevated due to non-thyroidal illness, can repeat TFTs in a few weeks as outpatient

## 2023-10-26 NOTE — PROGRESS NOTE ADULT - SUBJECTIVE AND OBJECTIVE BOX
Patient is a 57y old  Male who presents with a chief complaint of DIZZINESS     (23 Oct 2023 13:24)      INTERVAL HPI/OVERNIGHT EVENTS:    Pt. seen and examined earlier today  Pt. reports his L knee is less painful, has more ROM  No fevers    Review of Systems: 12 point review of systems otherwise negative    MEDICATIONS  (STANDING):  aspirin enteric coated 81 milliGRAM(s) Oral daily  atorvastatin 80 milliGRAM(s) Oral at bedtime  clopidogrel Tablet 75 milliGRAM(s) Oral daily  enoxaparin Injectable 40 milliGRAM(s) SubCutaneous every 24 hours  influenza   Vaccine 0.5 milliLiter(s) IntraMuscular once  labetalol 100 milliGRAM(s) Oral every 12 hours  lidocaine   4% Patch 1 Patch Transdermal every 24 hours  lisinopril 20 milliGRAM(s) Oral daily  meclizine 12.5 milliGRAM(s) Oral every 8 hours    MEDICATIONS  (PRN):  traMADol 25 milliGRAM(s) Oral every 6 hours PRN Moderate Pain (4 - 6)      Allergies    No Known Allergies    Intolerances          Vital Signs Last 24 Hrs  T(C): 36.8 (26 Oct 2023 15:41), Max: 37.3 (26 Oct 2023 09:12)  T(F): 98.2 (26 Oct 2023 15:41), Max: 99.2 (26 Oct 2023 09:12)  HR: 72 (26 Oct 2023 16:22) (71 - 91)  BP: 151/101 (26 Oct 2023 16:22) (117/71 - 165/102)  BP(mean): 105 (26 Oct 2023 11:03) (87 - 121)  RR: 16 (26 Oct 2023 15:41) (15 - 18)  SpO2: 94% (26 Oct 2023 15:41) (94% - 99%)    Parameters below as of 26 Oct 2023 15:41  Patient On (Oxygen Delivery Method): room air      CAPILLARY BLOOD GLUCOSE          10-25 @ 07:01  -  10-26 @ 07:00  --------------------------------------------------------  IN: 0 mL / OUT: 250 mL / NET: -250 mL    10-26 @ 07:01  -  10-26 @ 17:13  --------------------------------------------------------  IN: 560 mL / OUT: 0 mL / NET: 560 mL        Physical Exam:  (earlier today)  Daily     Daily   General:  comfortable-appearing in NAD  HEENT:  MMM  CV:  RRR  Lungs:  CTA B/L  Abdomen:  soft NT ND  Extremities:  L knee less swollen, warm, and tender; increased ROM; no erythema  Skin:  WWP  Neuro:  AAOx3    LABS:                        14.1   8.50  )-----------( 232      ( 26 Oct 2023 05:30 )             41.7     10-26    135  |  104  |  23  ----------------------------<  91  4.0   |  19<L>  |  1.02    Ca    9.1      26 Oct 2023 05:30  Phos  3.4     10-26  Mg     2.2     10-26        Urinalysis Basic - ( 26 Oct 2023 05:30 )    Color: x / Appearance: x / SG: x / pH: x  Gluc: 91 mg/dL / Ketone: x  / Bili: x / Urobili: x   Blood: x / Protein: x / Nitrite: x   Leuk Esterase: x / RBC: x / WBC x   Sq Epi: x / Non Sq Epi: x / Bacteria: x

## 2023-10-26 NOTE — DISCHARGE NOTE PROVIDER - NSDCFUADDAPPT_GEN_ALL_CORE_FT
patient should see pulmonology outpatient for follow up for pulmonary AVM. Call [phone] to make appt Patient should see pulmonology outpatient for follow up for pulmonary AVM. Call [phone] to make appt Patient should see pulmonology outpatient for follow up for pulmonary AVM. Call [phone] to make appt    You will receive a call from the neurology office to set up an outpatient follow up appointment. If you do not receive a call within one week of discharge, please call [phone] and ask to make an appointment with a provider from the stroke team. Continue aspirin and plavix for 3 months then aspirin alone.     Follow up with your PCP within 2 weeks of discharge from rehab

## 2023-10-26 NOTE — PROGRESS NOTE ADULT - PROBLEM SELECTOR PLAN 2
Pt. reports h/o trauma; x-ray reviewed, shows "medial knee joint mild to moderate narrowing, joint effusion, no acute fracture or dislocation;" sx improving; appreciate Ortho recs; cont. SUBHASH, pain control, PT

## 2023-10-26 NOTE — PROGRESS NOTE ADULT - SUBJECTIVE AND OBJECTIVE BOX
TRANSFER FROM STROKE TELE TO [facility]    Hospital course: 57y Male with PMHx of HTN (noncompliant with meds) originally presented to [facility] ED c/o room spinning dizziness x 1 week worse with head movement. CTH with no acute intracranial injury. Microvascular disease. Chronic lacunar infarcts. CTA head and neck with mild dot-appearance to the bilateral ACAs and MCAs that may represent vasculitis. Short severe segmental narrowing of the left proximal A2 segment of the anterior cerebral artery. Severe focal narrowing of the left M2/M3 segment of the middle cerebral artery. Normal CTA of the extracranial circulation. No evidence of carotid stenosis. Was given total of 30mg labetalol and 30mg hydralazine with SBP still 230s and so cardene gtt was started. Given imaging findings, stroke was consulted. Upon initial examination, NIHSS 0. Was awaiting MICU bed for admission under the stroke service for a stroke workup. Patient then became confused/aphasic in setting of SBP 130s. Stroke code was called. CT imaging unchanged. Exam slowly improving with SBP in the low 200s. Discussed case with Dr. Myles, since exam is improving and no change in imaging, likely symptoms are due to significant drop in BP. Admitted to MICU for stroke w/u. MRI brain punctate right periatrial white matter and left centrum semiovale, scattered punctate foci of microhemorrhages. MRA head with ICAD in L>R clinoid segments of ICA, left A2 severe narrowing, moderate left MCA post bifurcation narrowing. S/p cerebral angio 10/24 with multifocal ICAD. Stepped down to stroke tele.     INTERVAL HPI/OVERNIGHT EVENTS:  Patient seen and examined.      MEDICATIONS  (STANDING):  aspirin enteric coated 81 milliGRAM(s) Oral daily  atorvastatin 80 milliGRAM(s) Oral at bedtime  clopidogrel Tablet 75 milliGRAM(s) Oral daily  enoxaparin Injectable 40 milliGRAM(s) SubCutaneous every 24 hours  influenza   Vaccine 0.5 milliLiter(s) IntraMuscular once  labetalol 100 milliGRAM(s) Oral every 12 hours  lidocaine   4% Patch 1 Patch Transdermal every 24 hours  lisinopril 20 milliGRAM(s) Oral daily  meclizine 12.5 milliGRAM(s) Oral every 8 hours    MEDICATIONS  (PRN):  traMADol 25 milliGRAM(s) Oral every 6 hours PRN Moderate Pain (4 - 6)      Allergies    No Known Allergies    Intolerances        Vital Signs Last 24 Hrs  T(C): 37.1 (26 Oct 2023 04:49), Max: 37.1 (26 Oct 2023 04:49)  T(F): 98.8 (26 Oct 2023 04:49), Max: 98.8 (26 Oct 2023 04:49)  HR: 71 (26 Oct 2023 08:00) (71 - 91)  BP: 137/96 (26 Oct 2023 08:00) (117/71 - 167/98)  BP(mean): 111 (26 Oct 2023 08:00) (87 - 126)  RR: 15 (26 Oct 2023 08:00) (15 - 23)  SpO2: 98% (26 Oct 2023 08:00) (95% - 99%)    Parameters below as of 26 Oct 2023 08:00  Patient On (Oxygen Delivery Method): room air        Physical exam:  General: No acute distress, awake and alert  Eyes: Anicteric sclerae, moist conjunctivae, see below for CNs  Neck: trachea midline, FROM, supple, no thyromegaly or lymphadenopathy  Cardiovascular: Regular rate and rhythm, no murmurs, rubs, or gallops. No carotid bruits.   Pulmonary: Anterior breath sounds clear bilaterally, no crackles or wheezing. No use of accessory muscles  GI: Abdomen soft, non-distended, non-tender  Extremities: Radial and DP pulses +2, no edema    Neurologic:  -Mental status: Awake, alert, oriented to person, place, and time. Speech is fluent with intact naming, repetition, and comprehension, no dysarthria. Recent and remote memory intact. Follows commands. Attention/concentration intact. Fund of knowledge appropriate.  -Cranial nerves:   II: Visual fields are full to confrontation.  III, IV, VI: Extraocular movements are intact without nystagmus. Pupils equally round and reactive to light  V:  Facial sensation V1-V3 equal and intact   VII: Face is symmetric with normal eye closure and smile  VIII: Hearing is bilaterally intact to finger rub  IX, X: Uvula is midline and soft palate rises symmetrically  XI: Head turning and shoulder shrug are intact.  XII: Tongue protrudes midline  Motor: Normal bulk and tone. No pronator drift. Strength bilateral upper extremity 5/5, bilateral lower extremities 5/5.  Rapid alternating movements intact and symmetric  Sensation: Intact to light touch bilaterally. No neglect or extinction on double simultaneous testing.  Coordination: No dysmetria on finger-to-nose and heel-to-shin bilaterally  Reflexes: Downgoing toes bilaterally   Gait: Narrow gait and steady    LABS:                        14.1   8.50  )-----------( 232      ( 26 Oct 2023 05:30 )             41.7     10-26    135  |  104  |  23  ----------------------------<  91  4.0   |  19<L>  |  1.02    Ca    9.1      26 Oct 2023 05:30  Phos  3.4     10-26  Mg     2.2     10-26        Urinalysis Basic - ( 26 Oct 2023 05:30 )    Color: x / Appearance: x / SG: x / pH: x  Gluc: 91 mg/dL / Ketone: x  / Bili: x / Urobili: x   Blood: x / Protein: x / Nitrite: x   Leuk Esterase: x / RBC: x / WBC x   Sq Epi: x / Non Sq Epi: x / Bacteria: x        RADIOLOGY & ADDITIONAL TESTS:     TRANSFER FROM STROKE TELE TO [facility]    Hospital course: 57y Male with PMHx of HTN (noncompliant with meds) originally presented to [facility] ED c/o room spinning dizziness x 1 week worse with head movement. CTH with no acute intracranial injury. Microvascular disease. Chronic lacunar infarcts. CTA head and neck with mild dot-appearance to the bilateral ACAs and MCAs that may represent vasculitis. Short severe segmental narrowing of the left proximal A2 segment of the anterior cerebral artery. Severe focal narrowing of the left M2/M3 segment of the middle cerebral artery. Normal CTA of the extracranial circulation. No evidence of carotid stenosis. Was given total of 30mg labetalol and 30mg hydralazine with SBP still 230s and so cardene gtt was started. Given imaging findings, stroke was consulted. Upon initial examination, NIHSS 0. Was awaiting MICU bed for admission under the stroke service for a stroke workup. Patient then became confused/aphasic in setting of SBP 130s. Stroke code was called. CT imaging unchanged. Exam slowly improving with SBP in the low 200s. Discussed case with Dr. Myles, since exam is improving and no change in imaging, likely symptoms are due to significant drop in BP. Admitted to MICU for stroke w/u. MRI brain punctate right periatrial white matter and left centrum semiovale, scattered punctate foci of microhemorrhages. MRA head with ICAD in L>R clinoid segments of ICA, left A2 severe narrowing, moderate left MCA post bifurcation narrowing. S/p cerebral angio 10/24 with multifocal ICAD, no vasculitis. Likely dizziness is due to BPPV, improving with meclizine. TTE with bubble - EF 70-75%, no PFO. JONNY with pulmonary AVM. Pending LE dopplers. Pending ILR. Ortho consulted for L knee joint effusion s/p trauma. Recommend pain control. Stable for step down to regional. Pending AR.    INTERVAL HPI/OVERNIGHT EVENTS:  Patient seen and examined.      MEDICATIONS  (STANDING):  aspirin enteric coated 81 milliGRAM(s) Oral daily  atorvastatin 80 milliGRAM(s) Oral at bedtime  clopidogrel Tablet 75 milliGRAM(s) Oral daily  enoxaparin Injectable 40 milliGRAM(s) SubCutaneous every 24 hours  influenza   Vaccine 0.5 milliLiter(s) IntraMuscular once  labetalol 100 milliGRAM(s) Oral every 12 hours  lidocaine   4% Patch 1 Patch Transdermal every 24 hours  lisinopril 20 milliGRAM(s) Oral daily  meclizine 12.5 milliGRAM(s) Oral every 8 hours    MEDICATIONS  (PRN):  traMADol 25 milliGRAM(s) Oral every 6 hours PRN Moderate Pain (4 - 6)      Allergies    No Known Allergies    Intolerances        Vital Signs Last 24 Hrs  T(C): 37.1 (26 Oct 2023 04:49), Max: 37.1 (26 Oct 2023 04:49)  T(F): 98.8 (26 Oct 2023 04:49), Max: 98.8 (26 Oct 2023 04:49)  HR: 71 (26 Oct 2023 08:00) (71 - 91)  BP: 137/96 (26 Oct 2023 08:00) (117/71 - 167/98)  BP(mean): 111 (26 Oct 2023 08:00) (87 - 126)  RR: 15 (26 Oct 2023 08:00) (15 - 23)  SpO2: 98% (26 Oct 2023 08:00) (95% - 99%)    Parameters below as of 26 Oct 2023 08:00  Patient On (Oxygen Delivery Method): room air      Neurologic:  -Mental status: Awake, alert, oriented to person, place, and time. Speech is fluent, with intact naming, repetition, and comprehension, no dysarthria. Follows commands. Attention/concentration intact.  -Cranial nerves:   II: Visual fields are full to confrontation.  III, IV, VI: EOMs intact, mild nystagmus on right gaze appreciated. Pupils equally round and reactive to light  V:  Facial sensation V1-V3 equal and intact   VII: Face is symmetric with normal eye closure and smile  VIII: Hearing is bilaterally intact  XII: Tongue protrudes midline  Motor: Normal bulk and tone. No pronator drift. Strength bilateral upper extremity 5/5, bilateral lower extremities 5/5. Left knee swollen, TTP and warm to touch. No erythema.  Sensation: Intact to light touch bilaterally. No neglect or extinction on double simultaneous testing.  Coordination: No dysmetria on finger-to-nose bilaterally    LABS:                        14.1   8.50  )-----------( 232      ( 26 Oct 2023 05:30 )             41.7     10-26    135  |  104  |  23  ----------------------------<  91  4.0   |  19<L>  |  1.02    Ca    9.1      26 Oct 2023 05:30  Phos  3.4     10-26  Mg     2.2     10-26        Urinalysis Basic - ( 26 Oct 2023 05:30 )    Color: x / Appearance: x / SG: x / pH: x  Gluc: 91 mg/dL / Ketone: x  / Bili: x / Urobili: x   Blood: x / Protein: x / Nitrite: x   Leuk Esterase: x / RBC: x / WBC x   Sq Epi: x / Non Sq Epi: x / Bacteria: x        RADIOLOGY & ADDITIONAL TESTS:  reviewed   TRANSFER FROM STROKE TELE TO [facility]    Hospital course: 57y Male with PMHx of HTN (noncompliant with meds) originally presented to [facility] ED c/o room spinning dizziness x 1 week worse with head movement. CTH with no acute intracranial injury. Microvascular disease. Chronic lacunar infarcts. CTA head and neck with mild dot-appearance to the bilateral ACAs and MCAs that may represent vasculitis. Short severe segmental narrowing of the left proximal A2 segment of the anterior cerebral artery. Severe focal narrowing of the left M2/M3 segment of the middle cerebral artery. Normal CTA of the extracranial circulation. No evidence of carotid stenosis. Was given total of 30mg labetalol and 30mg hydralazine with SBP still 230s and so cardene gtt was started. Given imaging findings, stroke was consulted. Upon initial examination, NIHSS 0. Was awaiting MICU bed for admission under the stroke service for a stroke workup. Patient then became confused/aphasic in setting of SBP 130s. Stroke code was called. CT imaging unchanged. Exam slowly improving with SBP in the low 200s. Discussed case with Dr. Myles, since exam is improving and no change in imaging, likely symptoms are due to significant drop in BP. Admitted to MICU for stroke w/u. MRI brain punctate right periatrial white matter and left centrum semiovale, scattered punctate foci of microhemorrhages. MRA head with ICAD in L>R clinoid segments of ICA, left A2 severe narrowing, moderate left MCA post bifurcation narrowing. S/p cerebral angio 10/24 with multifocal ICAD, no vasculitis. Likely dizziness is due to BPPV, improving with meclizine. TTE with bubble - EF 70-75%, no PFO. JONNY with pulmonary AVM. Pending LE dopplers. ILR placed 10/26. Ortho consulted for L knee joint effusion s/p trauma. Recommend pain control. Stable for step down to regional. Pending AR.    INTERVAL HPI/OVERNIGHT EVENTS:  Patient seen and examined.      MEDICATIONS  (STANDING):  aspirin enteric coated 81 milliGRAM(s) Oral daily  atorvastatin 80 milliGRAM(s) Oral at bedtime  clopidogrel Tablet 75 milliGRAM(s) Oral daily  enoxaparin Injectable 40 milliGRAM(s) SubCutaneous every 24 hours  influenza   Vaccine 0.5 milliLiter(s) IntraMuscular once  labetalol 100 milliGRAM(s) Oral every 12 hours  lidocaine   4% Patch 1 Patch Transdermal every 24 hours  lisinopril 20 milliGRAM(s) Oral daily  meclizine 12.5 milliGRAM(s) Oral every 8 hours    MEDICATIONS  (PRN):  traMADol 25 milliGRAM(s) Oral every 6 hours PRN Moderate Pain (4 - 6)      Allergies    No Known Allergies    Intolerances        Vital Signs Last 24 Hrs  T(C): 37.1 (26 Oct 2023 04:49), Max: 37.1 (26 Oct 2023 04:49)  T(F): 98.8 (26 Oct 2023 04:49), Max: 98.8 (26 Oct 2023 04:49)  HR: 71 (26 Oct 2023 08:00) (71 - 91)  BP: 137/96 (26 Oct 2023 08:00) (117/71 - 167/98)  BP(mean): 111 (26 Oct 2023 08:00) (87 - 126)  RR: 15 (26 Oct 2023 08:00) (15 - 23)  SpO2: 98% (26 Oct 2023 08:00) (95% - 99%)    Parameters below as of 26 Oct 2023 08:00  Patient On (Oxygen Delivery Method): room air      Neurologic:  -Mental status: Awake, alert, oriented to person, place, and time. Speech is fluent, with intact naming, repetition, and comprehension, no dysarthria. Follows commands. Attention/concentration intact. Flat affect.  -Cranial nerves:   II: Visual fields are full to confrontation.  III, IV, VI: EOMs intact, mild nystagmus on right gaze appreciated. Pupils equally round and reactive to light  V:  Facial sensation V1-V3 equal and intact   VII: Face is symmetric with normal eye closure and smile  VIII: Hearing is bilaterally intact  XII: Tongue protrudes midline  Motor: Normal bulk and tone. No pronator drift. Strength bilateral upper extremity 5/5, bilateral lower extremities 5/5.  Sensation: Intact to light touch bilaterally. No neglect or extinction on double simultaneous testing.  Coordination: No dysmetria on finger-to-nose bilaterally    LABS:                        14.1   8.50  )-----------( 232      ( 26 Oct 2023 05:30 )             41.7     10-26    135  |  104  |  23  ----------------------------<  91  4.0   |  19<L>  |  1.02    Ca    9.1      26 Oct 2023 05:30  Phos  3.4     10-26  Mg     2.2     10-26        Urinalysis Basic - ( 26 Oct 2023 05:30 )    Color: x / Appearance: x / SG: x / pH: x  Gluc: 91 mg/dL / Ketone: x  / Bili: x / Urobili: x   Blood: x / Protein: x / Nitrite: x   Leuk Esterase: x / RBC: x / WBC x   Sq Epi: x / Non Sq Epi: x / Bacteria: x        RADIOLOGY & ADDITIONAL TESTS:  reviewed

## 2023-10-26 NOTE — DISCHARGE NOTE PROVIDER - NSDCMRMEDTOKEN_GEN_ALL_CORE_FT
aspirin 81 mg oral delayed release tablet: 1 tab(s) orally once a day  atorvastatin 40 mg oral tablet: 1 tab(s) orally once a day (at bedtime)  clopidogrel 75 mg oral tablet: 1 tab(s) orally once a day Take 1 tablet daily for 90 days, then STOP taking.  lisinopril 20 mg oral tablet: 1 tab(s) orally once a day  meclizine 12.5 mg oral tablet: 1 tab(s) orally every 8 hours x 30 days as needed for  dizziness  NIFEdipine 30 mg oral tablet, extended release: 1 tab(s) orally once a day

## 2023-10-26 NOTE — PROGRESS NOTE ADULT - SUBJECTIVE AND OBJECTIVE BOX
Physical Medicine and Rehabilitation Progress Note :       Patient is a 57y old  Male who presents with a chief complaint of DIZZINESS     (23 Oct 2023 13:24)      HPI:  HPI: 57y Male with PMHx of HTN (noncompliant with meds) originally presented to [facility] ED c/o room spinning dizziness x 1 week worse with head movement. CTH with no acute intracranial injury. Microvascular disease. Chronic lacunar infarcts. CTA head and neck with mild dot-appearance to the bilateral ACAs and MCAs that may represent vasculitis. Short severe segmental narrowing of the left proximal A2 segment of the anterior cerebral artery. Severe focal narrowing of the left M2/M3 segment of the middle cerebral artery. Normal CTA of the extracranial circulation. No evidence of carotid stenosis. Was given total of 30mg labetalol and 30mg hydralazine with SBP still 230 and so cardene gtt was started. Given imaging findings, stroke was consulted. Upon initial examination, NIHSS 0. Was awaiting MICU bed for admission under the stroke service for a stroke workup.    A few mins later received call from patient's nurse that patient was now confused and was not speaking as much. BP at the time 130s. Upon my eval, NIHSS 2 for confusion/aphasia (unable to say where he is or the year) and hence stroke code was called. CT imaging unchanged. Exam slowly improving with SBP in the low 200s. Discussed case with Dr. Myles, since exam is improving and no change in imaging, likely symptoms are due to significant drop in BP.    T(C): 36.6 (10-21-23 @ 11:37), Max: 36.6 (10-21-23 @ 11:37)  HR: 95 (10-21-23 @ 18:20) (78 - 112)  BP: 233/138 (10-21-23 @ 18:20) (130/89 - 236/141)  RR: 20 (10-21-23 @ 18:20) (16 - 28)  SpO2: 98% (10-21-23 @ 18:20) (96% - 100%)    PAST MEDICAL & SURGICAL HISTORY:  HTN (hypertension)    FAMILY HISTORY:   (21 Oct 2023 19:58)                            14.1   8.50  )-----------( 232      ( 26 Oct 2023 05:30 )             41.7       10-26    135  |  104  |  23  ----------------------------<  91  4.0   |  19<L>  |  1.02    Ca    9.1      26 Oct 2023 05:30  Phos  3.4     10-26  Mg     2.2     10-26      Vital Signs Last 24 Hrs  T(C): 37.3 (26 Oct 2023 09:12), Max: 37.3 (26 Oct 2023 09:12)  T(F): 99.2 (26 Oct 2023 09:12), Max: 99.2 (26 Oct 2023 09:12)  HR: 74 (26 Oct 2023 11:03) (71 - 91)  BP: 129/89 (26 Oct 2023 11:03) (117/71 - 167/98)  BP(mean): 105 (26 Oct 2023 11:03) (87 - 126)  RR: 16 (26 Oct 2023 11:03) (15 - 23)  SpO2: 98% (26 Oct 2023 11:03) (95% - 99%)    Parameters below as of 26 Oct 2023 11:03  Patient On (Oxygen Delivery Method): room air        MEDICATIONS  (STANDING):  aspirin enteric coated 81 milliGRAM(s) Oral daily  atorvastatin 80 milliGRAM(s) Oral at bedtime  clopidogrel Tablet 75 milliGRAM(s) Oral daily  enoxaparin Injectable 40 milliGRAM(s) SubCutaneous every 24 hours  influenza   Vaccine 0.5 milliLiter(s) IntraMuscular once  labetalol 100 milliGRAM(s) Oral every 12 hours  lidocaine   4% Patch 1 Patch Transdermal every 24 hours  lisinopril 20 milliGRAM(s) Oral daily  meclizine 12.5 milliGRAM(s) Oral every 8 hours    MEDICATIONS  (PRN):  traMADol 25 milliGRAM(s) Oral every 6 hours PRN Moderate Pain (4 - 6)        10/25/2023  Functional Status Assessment :       Pain Assessment/Number Scale (0-10) Adult  Presence of Pain: denies pain/discomfort (Rating = 0)  Pain Rating (0-10): Rest: 0 (no pain/absence of nonverbal indicators of pain)  Pain Rating (0-10): Activity: 0 (no pain/absence of nonverbal indicators of pain)    Safety      AM-PAC Functional Assessment: Basic Mobility  Type of Assessment: Daily assessment  Turning from your back to your side while in a flat bed without using bedrails?: 3 = A little assistance  Moving from lying on your back to sitting on the flat side of a flat bed without using bedrails?: 3 = A little assistance  Moving to and from a bed to a chair (including a wheelchair)?: 3 = A little assistance  Standing up from a chair using your arms (e.g. wheelchair or bedside chair)?: 3 = A little assistance  Walking in hospital room?: 3 = A little assistance  Climbing 3-5 steps with a railing?: 3-calculated by average   Score: 18   Row Comment: Ask the patient "How much help from another person do you currently need? (If the patient hasn't done an activity recently, how much help from another person do you think he/she needs if he/she tried?)    Cognitive/Neuro      Cognitive/Neuro/Behavioral  Cognitive/Neuro/Behavioral [WDL Definition: Alert; opens eyes spontaneously; arouses to voice or touch; oriented x 4; follows commands; speech spontaneous, logical; purposeful motor response; behavior appropriate to situation]: WDL    Language Assistance  Preferred Language to Address Healthcare Preferred Language to Address Healthcare: English    Therapeutic Interventions      Bed Mobility  Bed Mobility Training Sit-to-Supine: contact guard;  1 person assist;  verbal cues  Bed Mobility Training Supine-to-Sit: received seated at edge of bed   Bed Mobility Training Limitations: decreased ability to use legs for bridging/pushing;  impaired ability to control trunk for mobility;  decreased ability to use arms for pushing/pulling;  impaired balance;  decreased strength;  impaired postural control    Sit-Stand Transfer Training  Transfer Training Sit-to-Stand Transfer: minimum assist (75% patient effort);  2 person assist;  verbal cues;  b/l hand held  Transfer Training Stand-to-Sit Transfer: minimum assist (75% patient effort);  2 person assist;  verbal cues;  b/l hand held  Sit-to-Stand Transfer Training Transfer Safety Analysis: decreased weight-shifting ability;  impaired balance;  decreased strength;  impaired postural control    Gait Training  Gait Training: minimum assist (75% patient effort);  1 person assist;  verbal cues;  BUE on portable monitor ;  20 feet;  x 2  Gait Analysis: decreased subha;  decreased velocity of limb motion;  decreased hip/knee flexion;  decreased weight-shifting ability;  decreased step length;  ataxic gait, few instances of near buckling / near loss of balance recovered with min A;  impaired balance;  decreased strength;  impaired postural control          AM-PAC Functional Assessment: Daily Activity  Type of Assessment: Daily assessment  Putting on and taking off regular lower body clothing?: 4 = No assist / stand by assistance  Bathing (including washing, rinsing, drying)?: 3 = A little assistance  Toileting, which includes using toilet, bedpan or urinal?: 3 = A little assistance  Putting on and taking off regular upper body clothing?: 3 = A little assistance  Take care of personal grooming such as brushing teeth?: 4 = No assist / stand by assistance  Eating meals?: 4 = No assist / stand by assistance  Score: 21   Row Comment: Ask the patient "How much help from another person do you currently need? (If the patient hasn't done an activity recently, how much help from another person do you think he/she needs if he/she tried?)    Cognitive/Neuro      Cognitive/Neuro/Behavioral  Cognitive/Neuro/Behavioral [WDL Definition: Alert; opens eyes spontaneously; arouses to voice or touch; oriented x 4; follows commands; speech spontaneous, logical; purposeful motor response; behavior appropriate to situation]: WDL  Level of Consciousness: alert  Arousal Level: arouses to voice  Orientation: person;  situation;  place  Speech: clear;  spontaneous  Mood/Behavior: calm;  cooperative    Language Assistance  Preferred Language to Address Healthcare Preferred Language to Address Healthcare: English    Therapeutic Interventions      Bed Mobility  Bed Mobility Training Symptoms Noted During/After Treatment: fatigue  Bed Mobility Training Rolling/Turning: contact guard;  1 person assist;  verbal cues  Bed Mobility Training Scooting: contact guard;  1 person assist;  verbal cues  Bed Mobility Training Sit-to-Supine: contact guard;  1 person assist;  verbal cues  Bed Mobility Training Supine-to-Sit: contact guard;  1 person assist;  verbal cues  Bed Mobility Training Limitations: decreased strength;  impaired coordination;  impaired postural control;  decreased ability to use legs for bridging/pushing    Sit-Stand Transfer Training  Transfer Training Sit-to-Stand Transfer: contact guard;  1 person assist;  verbal cues;  rolling walker  Transfer Training Stand-to-Sit Transfer: 1 person assist;  contact guard;  verbal cues;  rolling walker  Sit-to-Stand Transfer Training Transfer Safety Analysis: decreased sequencing ability;  decreased balance;  decreased strength;  impaired balance;  impaired coordination;  decreased flexibility;  rolling walker    Therapeutic Exercise  Therapeutic Exercise Symptoms Noted During/After Treatment: fatigue  Therapeutic Exercise Charges: Pt ambulated ~15ftx2 w/ tele monitor and Min A (+taxic on gait), near LOB w/ difficulty regulating his steps     Lower Body Dressing Training  Lower Body Dressing Training Assistance: minimum assist (75% patient effort);  1 person assist;  verbal cues;  to doff, don socks;  decreased flexibility;  decreased strength;  impaired balance;  impaired coordination    Upper Body Dressing Training  Upper Body Dressing Training Assistance: minimum assist (75% patient effort);  1 person assist;  verbal cues;  to don UB garment - +gown;  decreased flexibility;  decreased strength;  impaired balance;  impaired coordination            PM&R Impression : as above    Current disposition plan recommendation :    acute rehab placement

## 2023-10-27 LAB
ANION GAP SERPL CALC-SCNC: 14 MMOL/L — SIGNIFICANT CHANGE UP (ref 5–17)
ANION GAP SERPL CALC-SCNC: 14 MMOL/L — SIGNIFICANT CHANGE UP (ref 5–17)
BUN SERPL-MCNC: 28 MG/DL — HIGH (ref 7–23)
BUN SERPL-MCNC: 28 MG/DL — HIGH (ref 7–23)
CALCIUM SERPL-MCNC: 9.5 MG/DL — SIGNIFICANT CHANGE UP (ref 8.4–10.5)
CALCIUM SERPL-MCNC: 9.5 MG/DL — SIGNIFICANT CHANGE UP (ref 8.4–10.5)
CHLORIDE SERPL-SCNC: 105 MMOL/L — SIGNIFICANT CHANGE UP (ref 96–108)
CHLORIDE SERPL-SCNC: 105 MMOL/L — SIGNIFICANT CHANGE UP (ref 96–108)
CO2 SERPL-SCNC: 19 MMOL/L — LOW (ref 22–31)
CO2 SERPL-SCNC: 19 MMOL/L — LOW (ref 22–31)
CREAT SERPL-MCNC: 1.19 MG/DL — SIGNIFICANT CHANGE UP (ref 0.5–1.3)
CREAT SERPL-MCNC: 1.19 MG/DL — SIGNIFICANT CHANGE UP (ref 0.5–1.3)
EGFR: 70 ML/MIN/1.73M2 — SIGNIFICANT CHANGE UP
EGFR: 70 ML/MIN/1.73M2 — SIGNIFICANT CHANGE UP
GLUCOSE SERPL-MCNC: 112 MG/DL — HIGH (ref 70–99)
GLUCOSE SERPL-MCNC: 112 MG/DL — HIGH (ref 70–99)
HCT VFR BLD CALC: 45.4 % — SIGNIFICANT CHANGE UP (ref 39–50)
HCT VFR BLD CALC: 45.4 % — SIGNIFICANT CHANGE UP (ref 39–50)
HGB BLD-MCNC: 15.4 G/DL — SIGNIFICANT CHANGE UP (ref 13–17)
HGB BLD-MCNC: 15.4 G/DL — SIGNIFICANT CHANGE UP (ref 13–17)
MAGNESIUM SERPL-MCNC: 2.2 MG/DL — SIGNIFICANT CHANGE UP (ref 1.6–2.6)
MAGNESIUM SERPL-MCNC: 2.2 MG/DL — SIGNIFICANT CHANGE UP (ref 1.6–2.6)
MCHC RBC-ENTMCNC: 32.3 PG — SIGNIFICANT CHANGE UP (ref 27–34)
MCHC RBC-ENTMCNC: 32.3 PG — SIGNIFICANT CHANGE UP (ref 27–34)
MCHC RBC-ENTMCNC: 33.9 GM/DL — SIGNIFICANT CHANGE UP (ref 32–36)
MCHC RBC-ENTMCNC: 33.9 GM/DL — SIGNIFICANT CHANGE UP (ref 32–36)
MCV RBC AUTO: 95.2 FL — SIGNIFICANT CHANGE UP (ref 80–100)
MCV RBC AUTO: 95.2 FL — SIGNIFICANT CHANGE UP (ref 80–100)
NRBC # BLD: 0 /100 WBCS — SIGNIFICANT CHANGE UP (ref 0–0)
NRBC # BLD: 0 /100 WBCS — SIGNIFICANT CHANGE UP (ref 0–0)
PHOSPHATE SERPL-MCNC: 3.9 MG/DL — SIGNIFICANT CHANGE UP (ref 2.5–4.5)
PHOSPHATE SERPL-MCNC: 3.9 MG/DL — SIGNIFICANT CHANGE UP (ref 2.5–4.5)
PLATELET # BLD AUTO: 289 K/UL — SIGNIFICANT CHANGE UP (ref 150–400)
PLATELET # BLD AUTO: 289 K/UL — SIGNIFICANT CHANGE UP (ref 150–400)
POTASSIUM SERPL-MCNC: 4 MMOL/L — SIGNIFICANT CHANGE UP (ref 3.5–5.3)
POTASSIUM SERPL-MCNC: 4 MMOL/L — SIGNIFICANT CHANGE UP (ref 3.5–5.3)
POTASSIUM SERPL-SCNC: 4 MMOL/L — SIGNIFICANT CHANGE UP (ref 3.5–5.3)
POTASSIUM SERPL-SCNC: 4 MMOL/L — SIGNIFICANT CHANGE UP (ref 3.5–5.3)
RBC # BLD: 4.77 M/UL — SIGNIFICANT CHANGE UP (ref 4.2–5.8)
RBC # BLD: 4.77 M/UL — SIGNIFICANT CHANGE UP (ref 4.2–5.8)
RBC # FLD: 11.9 % — SIGNIFICANT CHANGE UP (ref 10.3–14.5)
RBC # FLD: 11.9 % — SIGNIFICANT CHANGE UP (ref 10.3–14.5)
SODIUM SERPL-SCNC: 138 MMOL/L — SIGNIFICANT CHANGE UP (ref 135–145)
SODIUM SERPL-SCNC: 138 MMOL/L — SIGNIFICANT CHANGE UP (ref 135–145)
WBC # BLD: 7.81 K/UL — SIGNIFICANT CHANGE UP (ref 3.8–10.5)
WBC # BLD: 7.81 K/UL — SIGNIFICANT CHANGE UP (ref 3.8–10.5)
WBC # FLD AUTO: 7.81 K/UL — SIGNIFICANT CHANGE UP (ref 3.8–10.5)
WBC # FLD AUTO: 7.81 K/UL — SIGNIFICANT CHANGE UP (ref 3.8–10.5)

## 2023-10-27 PROCEDURE — 99232 SBSQ HOSP IP/OBS MODERATE 35: CPT

## 2023-10-27 PROCEDURE — 99233 SBSQ HOSP IP/OBS HIGH 50: CPT

## 2023-10-27 RX ADMIN — ATORVASTATIN CALCIUM 80 MILLIGRAM(S): 80 TABLET, FILM COATED ORAL at 21:52

## 2023-10-27 RX ADMIN — CLOPIDOGREL BISULFATE 75 MILLIGRAM(S): 75 TABLET, FILM COATED ORAL at 13:02

## 2023-10-27 RX ADMIN — Medication 81 MILLIGRAM(S): at 13:02

## 2023-10-27 RX ADMIN — Medication 12.5 MILLIGRAM(S): at 07:03

## 2023-10-27 RX ADMIN — Medication 100 MILLIGRAM(S): at 09:32

## 2023-10-27 RX ADMIN — LISINOPRIL 20 MILLIGRAM(S): 2.5 TABLET ORAL at 07:03

## 2023-10-27 RX ADMIN — ENOXAPARIN SODIUM 40 MILLIGRAM(S): 100 INJECTION SUBCUTANEOUS at 13:02

## 2023-10-27 RX ADMIN — Medication 12.5 MILLIGRAM(S): at 13:03

## 2023-10-27 RX ADMIN — Medication 12.5 MILLIGRAM(S): at 21:53

## 2023-10-27 RX ADMIN — LIDOCAINE 1 PATCH: 4 CREAM TOPICAL at 03:00

## 2023-10-27 RX ADMIN — LIDOCAINE 1 PATCH: 4 CREAM TOPICAL at 13:02

## 2023-10-27 RX ADMIN — Medication 100 MILLIGRAM(S): at 21:53

## 2023-10-27 RX ADMIN — LIDOCAINE 1 PATCH: 4 CREAM TOPICAL at 18:49

## 2023-10-27 NOTE — PROGRESS NOTE ADULT - SUBJECTIVE AND OBJECTIVE BOX
CONNIE STYLES  Male  MRN-[id number]    Interval:    No acute evnets overnight    VITAL SIGNS:  T(F): 98.2  HR: 67  BP: 116/80  RR: 16  SpO2: 96%    MS: Oriented x3. Recent and remote recall intact. Fluent. Follows crossed commands. Patient able to verbalize past history.   CN: VFF. EOMI. V1-V3 intact. Face symmetric. T/u midline. Should shrug intact bilaterally.   Motor: Normal tone. Full strength throughout.   Sensory: Intact to LT and PP throughout   Reflexes: 2+ throughout. Babinski absent bilaterally.   Coordination: No dysmetria on FNF or ataxia on HTS bilaterally     LABS:                          15.4   7.81  )-----------( 289      ( 27 Oct 2023 08:25 )             45.4     10-27    138  |  105  |  28<H>  ----------------------------<  112<H>  4.0   |  19<L>  |  1.19    Ca    9.5      27 Oct 2023 08:25  Phos  3.9     10-27  Mg     2.2     10-27          MEDICATIONS:   aspirin enteric coated 81 milliGRAM(s) Oral daily  atorvastatin 80 milliGRAM(s) Oral at bedtime  clopidogrel Tablet 75 milliGRAM(s) Oral daily  enoxaparin Injectable 40 milliGRAM(s) SubCutaneous every 24 hours  influenza   Vaccine 0.5 milliLiter(s) IntraMuscular once  labetalol 100 milliGRAM(s) Oral every 12 hours  lidocaine   4% Patch 1 Patch Transdermal every 24 hours  lisinopril 20 milliGRAM(s) Oral daily  meclizine 12.5 milliGRAM(s) Oral every 8 hours  traMADol 25 milliGRAM(s) Oral every 6 hours PRN          RADIOLOGY & ADDITIONAL STUDIES:

## 2023-10-27 NOTE — PROGRESS NOTE ADULT - SUBJECTIVE AND OBJECTIVE BOX
Patient is a 57y old  Male who presents with a chief complaint of DIZZINESS     (23 Oct 2023 13:24)      INTERVAL HPI/OVERNIGHT EVENTS:    Pt. seen and examined earlier today  Pt. feels well, reports less L knee pain  No new complaints    Review of Systems: 12 point review of systems otherwise negative    MEDICATIONS  (STANDING):  aspirin enteric coated 81 milliGRAM(s) Oral daily  atorvastatin 80 milliGRAM(s) Oral at bedtime  clopidogrel Tablet 75 milliGRAM(s) Oral daily  enoxaparin Injectable 40 milliGRAM(s) SubCutaneous every 24 hours  influenza   Vaccine 0.5 milliLiter(s) IntraMuscular once  labetalol 100 milliGRAM(s) Oral every 12 hours  lidocaine   4% Patch 1 Patch Transdermal every 24 hours  lisinopril 20 milliGRAM(s) Oral daily  meclizine 12.5 milliGRAM(s) Oral every 8 hours    MEDICATIONS  (PRN):  traMADol 25 milliGRAM(s) Oral every 6 hours PRN Moderate Pain (4 - 6)      Allergies    No Known Allergies    Intolerances          Vital Signs Last 24 Hrs  T(C): 36.8 (27 Oct 2023 12:47), Max: 37.1 (26 Oct 2023 23:58)  T(F): 98.2 (27 Oct 2023 12:47), Max: 98.7 (26 Oct 2023 23:58)  HR: 67 (27 Oct 2023 12:47) (67 - 100)  BP: 116/80 (27 Oct 2023 12:47) (116/80 - 166/100)  BP(mean): --  RR: 16 (27 Oct 2023 12:47) (16 - 16)  SpO2: 96% (27 Oct 2023 12:47) (94% - 98%)    Parameters below as of 27 Oct 2023 12:47  Patient On (Oxygen Delivery Method): room air      CAPILLARY BLOOD GLUCOSE          10-26 @ 07:01  -  10-27 @ 07:00  --------------------------------------------------------  IN: 560 mL / OUT: 0 mL / NET: 560 mL        Physical Exam:  (earlier today)  Daily     Daily   General:  comfortable-appearing in NAD  HEENT:  MMM  CV:  RRR  Lungs:  CTA B/L  Abdomen:  soft NT ND  Extremities:  L knee less swollen, non-tender; increased ROM; still warmer than R, but no erythema  Skin:  WWP  Neuro:  AAOx3      LABS:                        15.4   7.81  )-----------( 289      ( 27 Oct 2023 08:25 )             45.4     10-27    138  |  105  |  28<H>  ----------------------------<  112<H>  4.0   |  19<L>  |  1.19    Ca    9.5      27 Oct 2023 08:25  Phos  3.9     10-27  Mg     2.2     10-27        Urinalysis Basic - ( 27 Oct 2023 08:25 )    Color: x / Appearance: x / SG: x / pH: x  Gluc: 112 mg/dL / Ketone: x  / Bili: x / Urobili: x   Blood: x / Protein: x / Nitrite: x   Leuk Esterase: x / RBC: x / WBC x   Sq Epi: x / Non Sq Epi: x / Bacteria: x

## 2023-10-28 PROCEDURE — 99232 SBSQ HOSP IP/OBS MODERATE 35: CPT

## 2023-10-28 PROCEDURE — 99233 SBSQ HOSP IP/OBS HIGH 50: CPT

## 2023-10-28 RX ADMIN — Medication 81 MILLIGRAM(S): at 10:42

## 2023-10-28 RX ADMIN — LIDOCAINE 1 PATCH: 4 CREAM TOPICAL at 02:44

## 2023-10-28 RX ADMIN — LISINOPRIL 20 MILLIGRAM(S): 2.5 TABLET ORAL at 05:45

## 2023-10-28 RX ADMIN — ENOXAPARIN SODIUM 40 MILLIGRAM(S): 100 INJECTION SUBCUTANEOUS at 13:38

## 2023-10-28 RX ADMIN — Medication 12.5 MILLIGRAM(S): at 05:45

## 2023-10-28 RX ADMIN — CLOPIDOGREL BISULFATE 75 MILLIGRAM(S): 75 TABLET, FILM COATED ORAL at 10:42

## 2023-10-28 RX ADMIN — Medication 12.5 MILLIGRAM(S): at 22:35

## 2023-10-28 RX ADMIN — Medication 12.5 MILLIGRAM(S): at 13:38

## 2023-10-28 RX ADMIN — Medication 100 MILLIGRAM(S): at 22:36

## 2023-10-28 RX ADMIN — ATORVASTATIN CALCIUM 80 MILLIGRAM(S): 80 TABLET, FILM COATED ORAL at 22:36

## 2023-10-28 RX ADMIN — LIDOCAINE 1 PATCH: 4 CREAM TOPICAL at 17:08

## 2023-10-28 RX ADMIN — LIDOCAINE 1 PATCH: 4 CREAM TOPICAL at 13:38

## 2023-10-28 NOTE — PROGRESS NOTE ADULT - ASSESSMENT
57 with PMH of prediabetes, HTN presenting with CVA, and admitted to stroke service for further workup.       ·CVA (cerebrovascular accident)  - MRI with punctate foci of acute ischemia within the right periatrial white matter and left centrum semiovale ovale  - ICAD on angiogram  - management per neuro and rheum  - ILR placed  - continue on DAPT and statin    · Acute pain of left knee.   ·  Plan: Pt. reports h/o trauma; x-ray reviewed, shows "medial knee joint mild to moderate narrowing, joint effusion, no acute fracture or dislocation;" sx improving; appreciate Ortho recs; cont. RICE, pain control, PT.    Essential hypertension.   - BP per neuro  - continue on lisinopril 20mg daily     Elevated TSH.   - FT4 normal, repeat TFTs in 4-6 weeks outpatient      Prediabetes.   - blood glucose monitoring, lifestyle modification, outpatient follow up    50 minutes spent on this encounter, including face to face with patient, care coordination and documentation.  Plan of care discussed with stroke team.

## 2023-10-28 NOTE — PROGRESS NOTE ADULT - ASSESSMENT
Neurology    57 year old man w/ PMH of HTN (non compliant) presented w/ intermittent roomspinning dizziness. On exam, patient w/ unidirectional R. beating nystagmus on horizontal and vertical gaze, otherwise normal.     CTH negative  CTA h/n demonstrated multifocal ICAD, including severe stenosis of the L. A2, severe stenosis of the L. M2/M3, L > R. supraclinoid ICA stenosis.   CTP demonstrates Tmax >6s delay of 53cc in both hemispheres  MR brain demonstrates punctate infarction involving the R. white matter and L. centrum semiovale. Multiple chronic lacunar infarctions; Multiple +SWI microhemorrhages involving both cerebellar hemispheres, and bihemispheric.   A1c 5.7     ESR wnl; CRP wnl   RF 20 (mildly elevated)   Utox negative  TTE EF wnl; Moderate LVH; Normal LA; No PFO.   DSA +multifocal ICAD but without classic beading appearance that would suggest vasculitis.   JONNY +pulmonary AVM    Impression:   1. Intermittent vertigo exacerbated by head movements w/ unidirectional right-beating nystagmus -- etiology likely secondary to BPPV.   2. Incidental bilateral cerebral infarction in setting of multifocal intracranial stenosis -- mechanism of stroke likely intracranial large artery atherosclerotic disease. Less likely vasculitis given DSA findings and clinical history.     Plan:   ASA 81mg + Plavix 75mg for three months followed by ASA 81mg indefinitely per [facility]   Gradual normotension   LE US   ILR   Lovenox SQ for DVT ppx   PT - AR  Pulmonary follow up outpatient given pulmonary AVM

## 2023-10-28 NOTE — PROGRESS NOTE ADULT - SUBJECTIVE AND OBJECTIVE BOX
Physical Medicine and Rehabilitation Progress Note :       Patient is a 57y old  Male who presents with a chief complaint of DIZZINESS     (23 Oct 2023 13:24)      HPI:  HPI: 57y Male with PMHx of HTN (noncompliant with meds) originally presented to [facility] ED c/o room spinning dizziness x 1 week worse with head movement. CTH with no acute intracranial injury. Microvascular disease. Chronic lacunar infarcts. CTA head and neck with mild dot-appearance to the bilateral ACAs and MCAs that may represent vasculitis. Short severe segmental narrowing of the left proximal A2 segment of the anterior cerebral artery. Severe focal narrowing of the left M2/M3 segment of the middle cerebral artery. Normal CTA of the extracranial circulation. No evidence of carotid stenosis. Was given total of 30mg labetalol and 30mg hydralazine with SBP still 230 and so cardene gtt was started. Given imaging findings, stroke was consulted. Upon initial examination, NIHSS 0. Was awaiting MICU bed for admission under the stroke service for a stroke workup.    A few mins later received call from patient's nurse that patient was now confused and was not speaking as much. BP at the time 130s. Upon my eval, NIHSS 2 for confusion/aphasia (unable to say where he is or the year) and hence stroke code was called. CT imaging unchanged. Exam slowly improving with SBP in the low 200s. Discussed case with Dr. Myles, since exam is improving and no change in imaging, likely symptoms are due to significant drop in BP.    T(C): 36.6 (10-21-23 @ 11:37), Max: 36.6 (10-21-23 @ 11:37)  HR: 95 (10-21-23 @ 18:20) (78 - 112)  BP: 233/138 (10-21-23 @ 18:20) (130/89 - 236/141)  RR: 20 (10-21-23 @ 18:20) (16 - 28)  SpO2: 98% (10-21-23 @ 18:20) (96% - 100%)    PAST MEDICAL & SURGICAL HISTORY:  HTN (hypertension)    FAMILY HISTORY:   (21 Oct 2023 19:58)                            15.4   7.81  )-----------( 289      ( 27 Oct 2023 08:25 )             45.4       10-27    138  |  105  |  28<H>  ----------------------------<  112<H>  4.0   |  19<L>  |  1.19    Ca    9.5      27 Oct 2023 08:25  Phos  3.9     10-27  Mg     2.2     10-27      Vital Signs Last 24 Hrs  T(C): 36.6 (28 Oct 2023 10:44), Max: 37 (28 Oct 2023 05:40)  T(F): 97.8 (28 Oct 2023 10:44), Max: 98.6 (28 Oct 2023 05:40)  HR: 68 (28 Oct 2023 10:44) (68 - 74)  BP: 123/85 (28 Oct 2023 10:44) (123/85 - 155/105)  BP(mean): 106 (28 Oct 2023 05:40) (106 - 122)  RR: 18 (28 Oct 2023 10:44) (18 - 19)  SpO2: 97% (28 Oct 2023 10:44) (96% - 98%)    Parameters below as of 28 Oct 2023 10:44  Patient On (Oxygen Delivery Method): room air        MEDICATIONS  (STANDING):  aspirin enteric coated 81 milliGRAM(s) Oral daily  atorvastatin 80 milliGRAM(s) Oral at bedtime  clopidogrel Tablet 75 milliGRAM(s) Oral daily  enoxaparin Injectable 40 milliGRAM(s) SubCutaneous every 24 hours  influenza   Vaccine 0.5 milliLiter(s) IntraMuscular once  labetalol 100 milliGRAM(s) Oral every 12 hours  lidocaine   4% Patch 1 Patch Transdermal every 24 hours  lisinopril 20 milliGRAM(s) Oral daily  meclizine 12.5 milliGRAM(s) Oral every 8 hours    MEDICATIONS  (PRN):  traMADol 25 milliGRAM(s) Oral every 6 hours PRN Moderate Pain (4 - 6)        10/27/2023  Functional Status Assessment :       Pain Assessment/Number Scale (0-10) Adult  Presence of Pain: denies pain/discomfort (Rating = 0)  Pain Rating (0-10): Rest: 0 (no pain/absence of nonverbal indicators of pain)  Pain Rating (0-10): Activity: 0 (no pain/absence of nonverbal indicators of pain)    Re-assessment (Number Scale)  Pain Response to Interventions: no change in pain    Safety      AM-PAC Functional Assessment: Basic Mobility  Type of Assessment: Daily assessment  Turning from your back to your side while in a flat bed without using bedrails?: 4 = No assist / stand by assistance  Moving from lying on your back to sitting on the flat side of a flat bed without using bedrails?: 4 = No assist / stand by assistance  Moving to and from a bed to a chair (including a wheelchair)?: 3 = A little assistance  Standing up from a chair using your arms (e.g. wheelchair or bedside chair)?: 3 = A little assistance  Walking in hospital room?: 3 = A little assistance  Climbing 3-5 steps with a railing?: 3 = A little assistance  Score: 20   Row Comment: Ask the patient "How much help from another person do you currently need? (If the patient hasn't done an activity recently, how much help from another person do you think he/she needs if he/she tried?)    Cognitive/Neuro      Cognitive/Neuro/Behavioral  Cognitive/Neuro/Behavioral [WDL Definition: Alert; opens eyes spontaneously; arouses to voice or touch; oriented x 4; follows commands; speech spontaneous, logical; purposeful motor response; behavior appropriate to situation]: WDL  Level of Consciousness: alert  Arousal Level: arouses to voice  Orientation: oriented x 4  Speech: clear  Mood/Behavior: calm    Language Assistance  Preferred Language to Address Healthcare Preferred Language to Address Healthcare: English    Therapeutic Interventions      Bed Mobility  Bed Mobility Training Sit-to-Supine: contact guard;  1 person assist  Bed Mobility Training Supine-to-Sit: contact guard;  1 person assist  Bed Mobility Training Limitations: decreased ability to use arms for pushing/pulling;  decreased ability to use legs for bridging/pushing;  impaired ability to control trunk for mobility;  decreased strength;  impaired motor control;  impaired postural control    Sit-Stand Transfer Training  Transfer Training Sit-to-Stand Transfer: contact guard;  1 person assist;  weight-bearing as tolerated   rolling walker  Transfer Training Stand-to-Sit Transfer: contact guard;  1 person assist;  weight-bearing as tolerated   rolling walker  Sit-to-Stand Transfer Training Transfer Safety Analysis: decreased balance;  decreased step length;  decreased weight-shifting ability;  decreased strength;  impaired motor control;  impaired postural control;  rolling walker    Gait Training  Gait Training: contact guard;  1 person assist;  weight-bearing as tolerated   rolling walker;  20 feet  Gait Analysis: 3-point gait   decreased subha;  decreased step length;  decreased stride length;  decreased strength;  impaired motor control;  impaired postural control;  20ft;  rolling walker  Gait Number of Times:: x 3            PM&R Impression : as above    Current disposition plan recommendation :    acute rehab placement

## 2023-10-28 NOTE — PROGRESS NOTE ADULT - SUBJECTIVE AND OBJECTIVE BOX
Feeling okay this morning.  Denies any headache.        Remaining ROS negative       PHYSICAL EXAM:    General: no acute distress, sitting up in bed  HEENT: NC/AT; MMM  Cardiovascular: +S1/S2, RRR  Respiratory: CTA B/L; no W/R/R  Gastrointestinal: soft, NT/ND; +BSx4  Extremities: WWP; no edema  Neurological: speech is fluent, no acute deficits noted  Psychiatric: pleasant mood and affect  Dermatologic: no appreciable wounds or damage to the skin    VITAL SIGNS:  Vital Signs Last 24 Hrs  T(C): 36.6 (28 Oct 2023 10:44), Max: 37 (28 Oct 2023 05:40)  T(F): 97.8 (28 Oct 2023 10:44), Max: 98.6 (28 Oct 2023 05:40)  HR: 68 (28 Oct 2023 10:44) (68 - 74)  BP: 123/85 (28 Oct 2023 10:44) (123/85 - 155/105)  BP(mean): 106 (28 Oct 2023 05:40) (106 - 122)  RR: 18 (28 Oct 2023 10:44) (18 - 19)  SpO2: 97% (28 Oct 2023 10:44) (96% - 98%)    Parameters below as of 28 Oct 2023 10:44  Patient On (Oxygen Delivery Method): room air          MEDICATIONS:  MEDICATIONS  (STANDING):  aspirin enteric coated 81 milliGRAM(s) Oral daily  atorvastatin 80 milliGRAM(s) Oral at bedtime  clopidogrel Tablet 75 milliGRAM(s) Oral daily  enoxaparin Injectable 40 milliGRAM(s) SubCutaneous every 24 hours  influenza   Vaccine 0.5 milliLiter(s) IntraMuscular once  labetalol 100 milliGRAM(s) Oral every 12 hours  lidocaine   4% Patch 1 Patch Transdermal every 24 hours  lisinopril 20 milliGRAM(s) Oral daily  meclizine 12.5 milliGRAM(s) Oral every 8 hours    MEDICATIONS  (PRN):  traMADol 25 milliGRAM(s) Oral every 6 hours PRN Moderate Pain (4 - 6)      ALLERGIES:  Allergies    No Known Allergies    Intolerances        LABS:                        15.4   7.81  )-----------( 289      ( 27 Oct 2023 08:25 )             45.4     10-27    138  |  105  |  28<H>  ----------------------------<  112<H>  4.0   |  19<L>  |  1.19    Ca    9.5      27 Oct 2023 08:25  Phos  3.9     10-27  Mg     2.2     10-27        Urinalysis Basic - ( 27 Oct 2023 08:25 )    Color: x / Appearance: x / SG: x / pH: x  Gluc: 112 mg/dL / Ketone: x  / Bili: x / Urobili: x   Blood: x / Protein: x / Nitrite: x   Leuk Esterase: x / RBC: x / WBC x   Sq Epi: x / Non Sq Epi: x / Bacteria: x      CAPILLARY BLOOD GLUCOSE          RADIOLOGY & ADDITIONAL TESTS: Reviewed.

## 2023-10-29 DIAGNOSIS — Z29.9 ENCOUNTER FOR PROPHYLACTIC MEASURES, UNSPECIFIED: ICD-10-CM

## 2023-10-29 PROCEDURE — 99232 SBSQ HOSP IP/OBS MODERATE 35: CPT

## 2023-10-29 RX ORDER — LIDOCAINE 4 G/100G
1 CREAM TOPICAL EVERY 24 HOURS
Refills: 0 | Status: DISCONTINUED | OUTPATIENT
Start: 2023-10-29 | End: 2023-11-01

## 2023-10-29 RX ORDER — HYDRALAZINE HCL 50 MG
10 TABLET ORAL ONCE
Refills: 0 | Status: COMPLETED | OUTPATIENT
Start: 2023-10-29 | End: 2023-10-29

## 2023-10-29 RX ADMIN — LIDOCAINE 1 PATCH: 4 CREAM TOPICAL at 01:29

## 2023-10-29 RX ADMIN — ATORVASTATIN CALCIUM 80 MILLIGRAM(S): 80 TABLET, FILM COATED ORAL at 21:42

## 2023-10-29 RX ADMIN — Medication 10 MILLIGRAM(S): at 23:15

## 2023-10-29 RX ADMIN — LISINOPRIL 20 MILLIGRAM(S): 2.5 TABLET ORAL at 06:30

## 2023-10-29 RX ADMIN — Medication 12.5 MILLIGRAM(S): at 21:42

## 2023-10-29 RX ADMIN — CLOPIDOGREL BISULFATE 75 MILLIGRAM(S): 75 TABLET, FILM COATED ORAL at 11:15

## 2023-10-29 RX ADMIN — Medication 100 MILLIGRAM(S): at 21:42

## 2023-10-29 RX ADMIN — Medication 12.5 MILLIGRAM(S): at 06:30

## 2023-10-29 RX ADMIN — Medication 81 MILLIGRAM(S): at 11:15

## 2023-10-29 RX ADMIN — Medication 12.5 MILLIGRAM(S): at 13:59

## 2023-10-29 RX ADMIN — LIDOCAINE 1 PATCH: 4 CREAM TOPICAL at 13:59

## 2023-10-29 RX ADMIN — ENOXAPARIN SODIUM 40 MILLIGRAM(S): 100 INJECTION SUBCUTANEOUS at 14:00

## 2023-10-29 NOTE — PROGRESS NOTE ADULT - SUBJECTIVE AND OBJECTIVE BOX
No issues overnight.  Feeling okay, and pending placement at rehab facility.    Remaining ROS negative     PHYSICAL EXAM:      General: no acute distress, sitting up in bed  HEENT: NC/AT; MMM  Cardiovascular: +S1/S2, RRR  Respiratory: CTA B/L; no W/R/R  Gastrointestinal: soft, NT/ND; +BSx4  Extremities: WWP; no edema  Neurological: speech is fluent, no acute deficits noted  Psychiatric: pleasant mood and affect  Dermatologic: no appreciable wounds or damage to the skin    VITAL SIGNS:  Vital Signs Last 24 Hrs  T(C): 36.5 (29 Oct 2023 11:10), Max: 36.8 (28 Oct 2023 21:41)  T(F): 97.7 (29 Oct 2023 11:10), Max: 98.3 (28 Oct 2023 21:41)  HR: 73 (29 Oct 2023 11:10) (67 - 73)  BP: 131/86 (29 Oct 2023 11:10) (131/86 - 166/93)  BP(mean): --  RR: 16 (29 Oct 2023 11:10) (16 - 16)  SpO2: 96% (29 Oct 2023 11:10) (93% - 97%)    Parameters below as of 29 Oct 2023 11:10  Patient On (Oxygen Delivery Method): room air    MEDICATIONS:  MEDICATIONS  (STANDING):  aspirin enteric coated 81 milliGRAM(s) Oral daily  atorvastatin 80 milliGRAM(s) Oral at bedtime  clopidogrel Tablet 75 milliGRAM(s) Oral daily  enoxaparin Injectable 40 milliGRAM(s) SubCutaneous every 24 hours  influenza   Vaccine 0.5 milliLiter(s) IntraMuscular once  labetalol 100 milliGRAM(s) Oral every 12 hours  lidocaine   4% Patch 1 Patch Transdermal every 24 hours  lisinopril 20 milliGRAM(s) Oral daily  meclizine 12.5 milliGRAM(s) Oral every 8 hours    MEDICATIONS  (PRN):  traMADol 25 milliGRAM(s) Oral every 6 hours PRN Moderate Pain (4 - 6)      ALLERGIES:  Allergies    No Known Allergies    Intolerances        LABS:              CAPILLARY BLOOD GLUCOSE          RADIOLOGY & ADDITIONAL TESTS: Reviewed.

## 2023-10-29 NOTE — PROGRESS NOTE ADULT - SUBJECTIVE AND OBJECTIVE BOX
OVERNIGHT EVENTS:    SUBJECTIVE / INTERVAL HPI: Patient seen and examined at bedside.     VITAL SIGNS:  Vital Signs Last 24 Hrs  T(C): 36.8 (29 Oct 2023 06:02), Max: 36.8 (28 Oct 2023 21:41)  T(F): 98.3 (29 Oct 2023 06:02), Max: 98.3 (28 Oct 2023 21:41)  HR: 72 (29 Oct 2023 06:02) (67 - 72)  BP: 145/84 (29 Oct 2023 06:02) (123/85 - 166/93)  BP(mean): --  RR: 16 (29 Oct 2023 06:02) (16 - 18)  SpO2: 96% (29 Oct 2023 06:31) (93% - 97%)    Parameters below as of 29 Oct 2023 06:31  Patient On (Oxygen Delivery Method): room air      I&O's Summary    28 Oct 2023 07:01  -  29 Oct 2023 07:00  --------------------------------------------------------  IN: 250 mL / OUT: 300 mL / NET: -50 mL        PHYSICAL EXAM:    General: WDWN  HEENT: NC/AT; PERRL, anicteric sclera; MMM  Neck: supple  Cardiovascular: +S1/S2; RRR  Respiratory: CTA B/L; no W/R/R  Gastrointestinal: soft, NT/ND; +BSx4  Extremities: WWP; no edema, clubbing or cyanosis  Vascular: 2+ radial, DP/PT pulses B/L  Neurological: AAOx3; no focal deficits    MEDICATIONS:  MEDICATIONS  (STANDING):  aspirin enteric coated 81 milliGRAM(s) Oral daily  atorvastatin 80 milliGRAM(s) Oral at bedtime  clopidogrel Tablet 75 milliGRAM(s) Oral daily  enoxaparin Injectable 40 milliGRAM(s) SubCutaneous every 24 hours  influenza   Vaccine 0.5 milliLiter(s) IntraMuscular once  labetalol 100 milliGRAM(s) Oral every 12 hours  lidocaine   4% Patch 1 Patch Transdermal every 24 hours  lisinopril 20 milliGRAM(s) Oral daily  meclizine 12.5 milliGRAM(s) Oral every 8 hours    MEDICATIONS  (PRN):  traMADol 25 milliGRAM(s) Oral every 6 hours PRN Moderate Pain (4 - 6)      ALLERGIES:  Allergies    No Known Allergies    Intolerances        LABS:                        15.4   7.81  )-----------( 289      ( 27 Oct 2023 08:25 )             45.4     10-27    138  |  105  |  28<H>  ----------------------------<  112<H>  4.0   |  19<L>  |  1.19    Ca    9.5      27 Oct 2023 08:25  Phos  3.9     10-27  Mg     2.2     10-27        Urinalysis Basic - ( 27 Oct 2023 08:25 )    Color: x / Appearance: x / SG: x / pH: x  Gluc: 112 mg/dL / Ketone: x  / Bili: x / Urobili: x   Blood: x / Protein: x / Nitrite: x   Leuk Esterase: x / RBC: x / WBC x   Sq Epi: x / Non Sq Epi: x / Bacteria: x      CAPILLARY BLOOD GLUCOSE          RADIOLOGY & ADDITIONAL TESTS: Reviewed.   OVERNIGHT EVENTS: No AOE    SUBJECTIVE / INTERVAL HPI: Patient seen and examined at bedside. Denies dizziness, H/A, weakness. Able to eat/drink. Having BMs. No concerns at this time    VITAL SIGNS:  Vital Signs Last 24 Hrs  T(C): 36.8 (29 Oct 2023 06:02), Max: 36.8 (28 Oct 2023 21:41)  T(F): 98.3 (29 Oct 2023 06:02), Max: 98.3 (28 Oct 2023 21:41)  HR: 72 (29 Oct 2023 06:02) (67 - 72)  BP: 145/84 (29 Oct 2023 06:02) (123/85 - 166/93)  BP(mean): --  RR: 16 (29 Oct 2023 06:02) (16 - 18)  SpO2: 96% (29 Oct 2023 06:31) (93% - 97%)    Parameters below as of 29 Oct 2023 06:31  Patient On (Oxygen Delivery Method): room air      I&O's Summary    28 Oct 2023 07:01  -  29 Oct 2023 07:00  --------------------------------------------------------  IN: 250 mL / OUT: 300 mL / NET: -50 mL        PHYSICAL EXAM:    General: WDWN  HEENT: NC/AT; PERRL, anicteric sclera; MMM  Neck: supple  Cardiovascular: +S1/S2; RRR  Respiratory: CTA B/L; no W/R/R  Gastrointestinal: soft, NT/ND; +BSx4  Extremities: WWP; no edema, clubbing or cyanosis  Vascular: 2+ radial, DP/PT pulses B/L  Neurological: AAOx3; no focal deficits    MEDICATIONS:  MEDICATIONS  (STANDING):  aspirin enteric coated 81 milliGRAM(s) Oral daily  atorvastatin 80 milliGRAM(s) Oral at bedtime  clopidogrel Tablet 75 milliGRAM(s) Oral daily  enoxaparin Injectable 40 milliGRAM(s) SubCutaneous every 24 hours  influenza   Vaccine 0.5 milliLiter(s) IntraMuscular once  labetalol 100 milliGRAM(s) Oral every 12 hours  lidocaine   4% Patch 1 Patch Transdermal every 24 hours  lisinopril 20 milliGRAM(s) Oral daily  meclizine 12.5 milliGRAM(s) Oral every 8 hours    MEDICATIONS  (PRN):  traMADol 25 milliGRAM(s) Oral every 6 hours PRN Moderate Pain (4 - 6)      ALLERGIES:  Allergies    No Known Allergies    Intolerances        LABS:                        15.4   7.81  )-----------( 289      ( 27 Oct 2023 08:25 )             45.4     10-27    138  |  105  |  28<H>  ----------------------------<  112<H>  4.0   |  19<L>  |  1.19    Ca    9.5      27 Oct 2023 08:25  Phos  3.9     10-27  Mg     2.2     10-27        Urinalysis Basic - ( 27 Oct 2023 08:25 )    Color: x / Appearance: x / SG: x / pH: x  Gluc: 112 mg/dL / Ketone: x  / Bili: x / Urobili: x   Blood: x / Protein: x / Nitrite: x   Leuk Esterase: x / RBC: x / WBC x   Sq Epi: x / Non Sq Epi: x / Bacteria: x      CAPILLARY BLOOD GLUCOSE          RADIOLOGY & ADDITIONAL TESTS: Reviewed.

## 2023-10-29 NOTE — PROGRESS NOTE ADULT - PROBLEM SELECTOR PLAN 4
Borderline; asymptomatic; free T4 WNL; likely elevated due to non-thyroidal illness, can repeat TFTs in a few weeks as outpatient.    Plan:  -NTD

## 2023-10-29 NOTE — PROGRESS NOTE ADULT - ASSESSMENT
57 year old man w/ PMH of HTN (noncompliant) presented w/ intermittent roomspinning dizziness, admitted to neurology service for further workup.

## 2023-10-29 NOTE — PROGRESS NOTE ADULT - SUBJECTIVE AND OBJECTIVE BOX
CONNIE STYLES  Male  MRN-[id number]    Interval:    No acute events    VITAL SIGNS:  T(F): 97.7  HR: 73  BP: 131/86  RR: 16  SpO2: 96%    MS: Oriented x3. Recent and remote recall intact. Fluent. Follows crossed commands. Patient able to verbalize past history.   CN: VFF. EOMI. V1-V3 intact. Face symmetric. T/u midline. Should shrug intact bilaterally.   Motor: Normal tone. Full strength throughout.   Sensory: Intact to LT and PP throughout   Reflexes: 2+ throughout. Babinski absent bilaterally.   Coordination: No dysmetria on FNF or ataxia on HTS bilaterally   Gait: Normal     LABS:                MEDICATIONS:   aspirin enteric coated 81 milliGRAM(s) Oral daily  atorvastatin 80 milliGRAM(s) Oral at bedtime  clopidogrel Tablet 75 milliGRAM(s) Oral daily  enoxaparin Injectable 40 milliGRAM(s) SubCutaneous every 24 hours  influenza   Vaccine 0.5 milliLiter(s) IntraMuscular once  labetalol 100 milliGRAM(s) Oral every 12 hours  lidocaine   4% Patch 1 Patch Transdermal every 24 hours  lisinopril 20 milliGRAM(s) Oral daily  meclizine 12.5 milliGRAM(s) Oral every 8 hours  traMADol 25 milliGRAM(s) Oral every 6 hours PRN          RADIOLOGY & ADDITIONAL STUDIES:

## 2023-10-29 NOTE — PROGRESS NOTE ADULT - PROBLEM SELECTOR PLAN 2
Pt. reports h/o trauma. X-ray reviewed, shows "medial knee joint mild to moderate narrowing, joint effusion, no acute fracture or dislocation." Symptoms improving; appreciate ortho recs    Plan:  -Continue RICE therapy  -PT

## 2023-10-29 NOTE — PROGRESS NOTE ADULT - ASSESSMENT
57 with PMH of prediabetes, HTN presenting with CVA, and admitted to stroke service for further workup.       ·CVA (cerebrovascular accident)  - MRI with punctate foci of acute ischemia within the right periatrial white matter and left centrum semiovale ovale  - ICAD on angiogram  - management per neuro and rheum  - ILR placed  - continue on DAPT and statin    · Acute pain of left knee.   ·  Plan: Pt. reports h/o trauma; x-ray reviewed, shows "medial knee joint mild to moderate narrowing, joint effusion, no acute fracture or dislocation;" sx improving; appreciate Ortho recs; cont. RICE, pain control, PT.    Essential hypertension.   - BP per neuro  - continue on lisinopril 20mg daily     Elevated TSH.   - FT4 normal, repeat TFTs in 4-6 weeks outpatient      Prediabetes.   - blood glucose monitoring, lifestyle modification, outpatient follow up    Pending discharge to [facility].      35 minutes spent on this encounter, including face to face with patient, care coordination and documentation.  Plan of care discussed with stroke team.

## 2023-10-29 NOTE — PROGRESS NOTE ADULT - ASSESSMENT
57 year old man w/ PMH of HTN (non compliant) presented w/ intermittent roomspinning dizziness. On exam, patient w/ unidirectional R. beating nystagmus on horizontal and vertical gaze, otherwise normal.     CTH negative  CTA h/n demonstrated multifocal ICAD, including severe stenosis of the L. A2, severe stenosis of the L. M2/M3, L > R. supraclinoid ICA stenosis.   CTP demonstrates Tmax >6s delay of 53cc in both hemispheres  MR brain demonstrates punctate infarction involving the R. white matter and L. centrum semiovale. Multiple chronic lacunar infarctions; Multiple +SWI microhemorrhages involving both cerebellar hemispheres, and bihemispheric.   A1c 5.7     ESR wnl; CRP wnl   RF 20 (mildly elevated)   Utox negative  TTE EF wnl; Moderate LVH; Normal LA; No PFO.   DSA +multifocal ICAD but without classic beading appearance that would suggest vasculitis.   JONNY +pulmonary AVM  LE US neg    Impression:   1. Intermittent vertigo exacerbated by head movements w/ unidirectional right-beating nystagmus -- etiology likely secondary to BPPV.   2. Incidental bilateral cerebral infarction in setting of multifocal intracranial stenosis -- mechanism of stroke likely intracranial large artery atherosclerotic disease. Less likely vasculitis given DSA findings and clinical history.     Plan:   ASA 81mg + Plavix 75mg for three months followed by ASA 81mg indefinitely per [facility]   Gradual normotension   s/p ILR   Lovenox SQ for DVT ppx   PT - AR  Pulmonary follow up outpatient given pulmonary AVM     35 minutes spent on total encounter. The necessity of the time spent during the encounter on this date of service was due to:     Review of imaging and chart; obtaining history; examination of pt; discussion and coordination of care, and discussion of lifestyle modification and risk factor control.

## 2023-10-30 LAB
ANION GAP SERPL CALC-SCNC: 11 MMOL/L — SIGNIFICANT CHANGE UP (ref 5–17)
ANION GAP SERPL CALC-SCNC: 11 MMOL/L — SIGNIFICANT CHANGE UP (ref 5–17)
BASOPHILS # BLD AUTO: 0.03 K/UL — SIGNIFICANT CHANGE UP (ref 0–0.2)
BASOPHILS # BLD AUTO: 0.03 K/UL — SIGNIFICANT CHANGE UP (ref 0–0.2)
BASOPHILS NFR BLD AUTO: 0.4 % — SIGNIFICANT CHANGE UP (ref 0–2)
BASOPHILS NFR BLD AUTO: 0.4 % — SIGNIFICANT CHANGE UP (ref 0–2)
BUN SERPL-MCNC: 21 MG/DL — SIGNIFICANT CHANGE UP (ref 7–23)
BUN SERPL-MCNC: 21 MG/DL — SIGNIFICANT CHANGE UP (ref 7–23)
CALCIUM SERPL-MCNC: 9.4 MG/DL — SIGNIFICANT CHANGE UP (ref 8.4–10.5)
CALCIUM SERPL-MCNC: 9.4 MG/DL — SIGNIFICANT CHANGE UP (ref 8.4–10.5)
CHLORIDE SERPL-SCNC: 104 MMOL/L — SIGNIFICANT CHANGE UP (ref 96–108)
CHLORIDE SERPL-SCNC: 104 MMOL/L — SIGNIFICANT CHANGE UP (ref 96–108)
CO2 SERPL-SCNC: 23 MMOL/L — SIGNIFICANT CHANGE UP (ref 22–31)
CO2 SERPL-SCNC: 23 MMOL/L — SIGNIFICANT CHANGE UP (ref 22–31)
CREAT SERPL-MCNC: 0.93 MG/DL — SIGNIFICANT CHANGE UP (ref 0.5–1.3)
CREAT SERPL-MCNC: 0.93 MG/DL — SIGNIFICANT CHANGE UP (ref 0.5–1.3)
EGFR: 95 ML/MIN/1.73M2 — SIGNIFICANT CHANGE UP
EGFR: 95 ML/MIN/1.73M2 — SIGNIFICANT CHANGE UP
EOSINOPHIL # BLD AUTO: 0.15 K/UL — SIGNIFICANT CHANGE UP (ref 0–0.5)
EOSINOPHIL # BLD AUTO: 0.15 K/UL — SIGNIFICANT CHANGE UP (ref 0–0.5)
EOSINOPHIL NFR BLD AUTO: 2.2 % — SIGNIFICANT CHANGE UP (ref 0–6)
EOSINOPHIL NFR BLD AUTO: 2.2 % — SIGNIFICANT CHANGE UP (ref 0–6)
GLUCOSE SERPL-MCNC: 114 MG/DL — HIGH (ref 70–99)
GLUCOSE SERPL-MCNC: 114 MG/DL — HIGH (ref 70–99)
HCT VFR BLD CALC: 43.7 % — SIGNIFICANT CHANGE UP (ref 39–50)
HCT VFR BLD CALC: 43.7 % — SIGNIFICANT CHANGE UP (ref 39–50)
HGB BLD-MCNC: 15.1 G/DL — SIGNIFICANT CHANGE UP (ref 13–17)
HGB BLD-MCNC: 15.1 G/DL — SIGNIFICANT CHANGE UP (ref 13–17)
IMM GRANULOCYTES NFR BLD AUTO: 0.3 % — SIGNIFICANT CHANGE UP (ref 0–0.9)
IMM GRANULOCYTES NFR BLD AUTO: 0.3 % — SIGNIFICANT CHANGE UP (ref 0–0.9)
LYMPHOCYTES # BLD AUTO: 1.37 K/UL — SIGNIFICANT CHANGE UP (ref 1–3.3)
LYMPHOCYTES # BLD AUTO: 1.37 K/UL — SIGNIFICANT CHANGE UP (ref 1–3.3)
LYMPHOCYTES # BLD AUTO: 20.1 % — SIGNIFICANT CHANGE UP (ref 13–44)
LYMPHOCYTES # BLD AUTO: 20.1 % — SIGNIFICANT CHANGE UP (ref 13–44)
MCHC RBC-ENTMCNC: 32.3 PG — SIGNIFICANT CHANGE UP (ref 27–34)
MCHC RBC-ENTMCNC: 32.3 PG — SIGNIFICANT CHANGE UP (ref 27–34)
MCHC RBC-ENTMCNC: 34.6 GM/DL — SIGNIFICANT CHANGE UP (ref 32–36)
MCHC RBC-ENTMCNC: 34.6 GM/DL — SIGNIFICANT CHANGE UP (ref 32–36)
MCV RBC AUTO: 93.6 FL — SIGNIFICANT CHANGE UP (ref 80–100)
MCV RBC AUTO: 93.6 FL — SIGNIFICANT CHANGE UP (ref 80–100)
MONOCYTES # BLD AUTO: 0.7 K/UL — SIGNIFICANT CHANGE UP (ref 0–0.9)
MONOCYTES # BLD AUTO: 0.7 K/UL — SIGNIFICANT CHANGE UP (ref 0–0.9)
MONOCYTES NFR BLD AUTO: 10.3 % — SIGNIFICANT CHANGE UP (ref 2–14)
MONOCYTES NFR BLD AUTO: 10.3 % — SIGNIFICANT CHANGE UP (ref 2–14)
NEUTROPHILS # BLD AUTO: 4.53 K/UL — SIGNIFICANT CHANGE UP (ref 1.8–7.4)
NEUTROPHILS # BLD AUTO: 4.53 K/UL — SIGNIFICANT CHANGE UP (ref 1.8–7.4)
NEUTROPHILS NFR BLD AUTO: 66.7 % — SIGNIFICANT CHANGE UP (ref 43–77)
NEUTROPHILS NFR BLD AUTO: 66.7 % — SIGNIFICANT CHANGE UP (ref 43–77)
NRBC # BLD: 0 /100 WBCS — SIGNIFICANT CHANGE UP (ref 0–0)
NRBC # BLD: 0 /100 WBCS — SIGNIFICANT CHANGE UP (ref 0–0)
PLATELET # BLD AUTO: 293 K/UL — SIGNIFICANT CHANGE UP (ref 150–400)
PLATELET # BLD AUTO: 293 K/UL — SIGNIFICANT CHANGE UP (ref 150–400)
POTASSIUM SERPL-MCNC: 4 MMOL/L — SIGNIFICANT CHANGE UP (ref 3.5–5.3)
POTASSIUM SERPL-MCNC: 4 MMOL/L — SIGNIFICANT CHANGE UP (ref 3.5–5.3)
POTASSIUM SERPL-SCNC: 4 MMOL/L — SIGNIFICANT CHANGE UP (ref 3.5–5.3)
POTASSIUM SERPL-SCNC: 4 MMOL/L — SIGNIFICANT CHANGE UP (ref 3.5–5.3)
RBC # BLD: 4.67 M/UL — SIGNIFICANT CHANGE UP (ref 4.2–5.8)
RBC # BLD: 4.67 M/UL — SIGNIFICANT CHANGE UP (ref 4.2–5.8)
RBC # FLD: 11.8 % — SIGNIFICANT CHANGE UP (ref 10.3–14.5)
RBC # FLD: 11.8 % — SIGNIFICANT CHANGE UP (ref 10.3–14.5)
SODIUM SERPL-SCNC: 138 MMOL/L — SIGNIFICANT CHANGE UP (ref 135–145)
SODIUM SERPL-SCNC: 138 MMOL/L — SIGNIFICANT CHANGE UP (ref 135–145)
WBC # BLD: 6.8 K/UL — SIGNIFICANT CHANGE UP (ref 3.8–10.5)
WBC # BLD: 6.8 K/UL — SIGNIFICANT CHANGE UP (ref 3.8–10.5)
WBC # FLD AUTO: 6.8 K/UL — SIGNIFICANT CHANGE UP (ref 3.8–10.5)
WBC # FLD AUTO: 6.8 K/UL — SIGNIFICANT CHANGE UP (ref 3.8–10.5)

## 2023-10-30 PROCEDURE — 99232 SBSQ HOSP IP/OBS MODERATE 35: CPT

## 2023-10-30 PROCEDURE — 99233 SBSQ HOSP IP/OBS HIGH 50: CPT

## 2023-10-30 RX ORDER — NIFEDIPINE 30 MG
30 TABLET, EXTENDED RELEASE 24 HR ORAL DAILY
Refills: 0 | Status: DISCONTINUED | OUTPATIENT
Start: 2023-10-30 | End: 2023-11-01

## 2023-10-30 RX ADMIN — CLOPIDOGREL BISULFATE 75 MILLIGRAM(S): 75 TABLET, FILM COATED ORAL at 11:53

## 2023-10-30 RX ADMIN — ENOXAPARIN SODIUM 40 MILLIGRAM(S): 100 INJECTION SUBCUTANEOUS at 12:25

## 2023-10-30 RX ADMIN — Medication 100 MILLIGRAM(S): at 09:11

## 2023-10-30 RX ADMIN — Medication 12.5 MILLIGRAM(S): at 06:44

## 2023-10-30 RX ADMIN — LIDOCAINE 1 PATCH: 4 CREAM TOPICAL at 07:20

## 2023-10-30 RX ADMIN — LIDOCAINE 1 PATCH: 4 CREAM TOPICAL at 00:00

## 2023-10-30 RX ADMIN — Medication 12.5 MILLIGRAM(S): at 21:48

## 2023-10-30 RX ADMIN — LISINOPRIL 20 MILLIGRAM(S): 2.5 TABLET ORAL at 06:44

## 2023-10-30 RX ADMIN — ATORVASTATIN CALCIUM 80 MILLIGRAM(S): 80 TABLET, FILM COATED ORAL at 21:49

## 2023-10-30 RX ADMIN — Medication 30 MILLIGRAM(S): at 11:53

## 2023-10-30 RX ADMIN — Medication 81 MILLIGRAM(S): at 11:53

## 2023-10-30 RX ADMIN — LIDOCAINE 1 PATCH: 4 CREAM TOPICAL at 13:01

## 2023-10-30 RX ADMIN — Medication 12.5 MILLIGRAM(S): at 13:03

## 2023-10-30 NOTE — PROGRESS NOTE ADULT - ATTENDING COMMENTS
The patient is a 57-year-old male with a history of hypertension and HLD admitted with intermittent vertigo that is most consistent with peripheral etiology.  However, MRI showed bilateral punctate regions of acute ischemia in the background of severe small vessel disease/microhemorrhages likely reflection of poorly controlled hypertension.  CTA/DSA notable for multifocal ICAD.  TTE/JONNY unrevealing.  Status post ILR.  Plan for DAPT for 3 months then aspirin monotherapy.  Continue high intensity statin. Gradual reduction of BP. Pending placement for rehab.

## 2023-10-30 NOTE — PROGRESS NOTE ADULT - PROBLEM SELECTOR PLAN 2
Pt. reports h/o trauma. X-ray reviewed, shows "medial knee joint mild to moderate narrowing, joint effusion, no acute fracture or dislocation." Symptoms improving; appreciate ortho recs    Plan:  -Continue RICE therapy  -PT negative

## 2023-10-30 NOTE — PROGRESS NOTE ADULT - SUBJECTIVE AND OBJECTIVE BOX
OVERNIGHT EVENTS: AYESHA    SUBJECTIVE / INTERVAL HPI: Patient seen and examined at bedside. Reports he has not any episodes of dizziness anymore. Denies headache, chest pain, sob, abd pain, n/v/d.     VITAL SIGNS:  Vital Signs Last 24 Hrs  T(C): 36.8 (30 Oct 2023 05:44), Max: 37.2 (29 Oct 2023 20:30)  T(F): 98.3 (30 Oct 2023 05:44), Max: 99 (29 Oct 2023 20:30)  HR: 78 (30 Oct 2023 09:05) (67 - 78)  BP: 170/106 (30 Oct 2023 09:05) (131/86 - 179/125)  BP(mean): --  RR: 18 (30 Oct 2023 05:44) (16 - 18)  SpO2: 94% (30 Oct 2023 05:44) (94% - 97%)    Parameters below as of 30 Oct 2023 05:44  Patient On (Oxygen Delivery Method): room air        PHYSICAL EXAM:    General: WDWN  HEENT: NC/AT; PERRL, anicteric sclera; MMM  Neck: supple  Cardiovascular: +S1/S2; RRR  Respiratory: CTA B/L; no W/R/R  Gastrointestinal: soft, NT/ND; +BSx4  Extremities: WWP; no edema, clubbing or cyanosis  Vascular: 2+ radial, DP/PT pulses B/L  Neurological: AAOx3; no focal deficits    MEDICATIONS:  MEDICATIONS  (STANDING):  aspirin enteric coated 81 milliGRAM(s) Oral daily  atorvastatin 80 milliGRAM(s) Oral at bedtime  clopidogrel Tablet 75 milliGRAM(s) Oral daily  enoxaparin Injectable 40 milliGRAM(s) SubCutaneous every 24 hours  influenza   Vaccine 0.5 milliLiter(s) IntraMuscular once  labetalol 100 milliGRAM(s) Oral every 12 hours  lidocaine   4% Patch 1 Patch Transdermal every 24 hours  lisinopril 20 milliGRAM(s) Oral daily  meclizine 12.5 milliGRAM(s) Oral every 8 hours    MEDICATIONS  (PRN):  traMADol 25 milliGRAM(s) Oral every 6 hours PRN Moderate Pain (4 - 6)      ALLERGIES:  Allergies    No Known Allergies    Intolerances        LABS:              CAPILLARY BLOOD GLUCOSE          RADIOLOGY & ADDITIONAL TESTS: Reviewed.

## 2023-10-30 NOTE — PROGRESS NOTE ADULT - ASSESSMENT
57 with PMH of prediabetes, HTN presenting with CVA, and admitted to stroke service for further workup.       ·CVA (cerebrovascular accident)  - MRI with punctate foci of acute ischemia within the right periatrial white matter and left centrum semiovale ovale  - ICAD on angiogram  - management per neuro and rheum  - ILR placed  - continue on DAPT and statin    · Acute pain of left knee.   ·  Plan: Pt. reports h/o trauma; x-ray reviewed, shows "medial knee joint mild to moderate narrowing, joint effusion, no acute fracture or dislocation;" sx improving; appreciate Ortho recs; cont. RICE, pain control, PT.    Essential hypertension.   - BP per neuro  - continue on lisinopril 20mg daily  - switched from labetalol to nifedipine 30mg daily     Elevated TSH.   - FT4 normal, repeat TFTs in 4-6 weeks outpatient      Prediabetes.   - blood glucose monitoring, lifestyle modification, outpatient follow up    Pending discharge to [facility].      35 minutes spent on this encounter, including face to face with patient, care coordination and documentation.  Plan of care discussed with stroke team.

## 2023-10-30 NOTE — PROGRESS NOTE ADULT - PROBLEM SELECTOR PLAN 3
BP goal per Neuro.    Plan:  -Gradual normotension   -Continue DASH diet  -Lisinopril 20mg qhs, labetalol 100mg bid

## 2023-10-30 NOTE — PROGRESS NOTE ADULT - SUBJECTIVE AND OBJECTIVE BOX
No issues overnight.  Denies any shortness of breath or chest pain.  Explained that his blood pressure is still elevated and that we will be making some adjustments to his medications.     Remaining ROS negative     PHYSICAL EXAM:    General: WDWN  HEENT: NC/AT; PERRL, anicteric sclera; MMM  Neck: supple  Cardiovascular: +S1/S2, RRR  Respiratory: CTA B/L; no W/R/R  Gastrointestinal: soft, NT/ND; +BSx4  Extremities: WWP; no edema, clubbing or cyanosis  Vascular: 2+ radial, DP/PT pulses B/L  Neurological: AAOx3; no focal deficits  Psychiatric: pleasant mood and affect  Dermatologic: no appreciable wounds or damage to the skin    VITAL SIGNS:  Vital Signs Last 24 Hrs  T(C): 36.8 (30 Oct 2023 05:44), Max: 37.2 (29 Oct 2023 20:30)  T(F): 98.3 (30 Oct 2023 05:44), Max: 99 (29 Oct 2023 20:30)  HR: 91 (30 Oct 2023 12:15) (67 - 91)  BP: 127/91 (30 Oct 2023 12:15) (127/91 - 179/125)  BP(mean): --  RR: 18 (30 Oct 2023 12:15) (18 - 18)  SpO2: 97% (30 Oct 2023 12:15) (94% - 97%)    Parameters below as of 30 Oct 2023 12:15  Patient On (Oxygen Delivery Method): room air    MEDICATIONS:  MEDICATIONS  (STANDING):  aspirin enteric coated 81 milliGRAM(s) Oral daily  atorvastatin 80 milliGRAM(s) Oral at bedtime  clopidogrel Tablet 75 milliGRAM(s) Oral daily  enoxaparin Injectable 40 milliGRAM(s) SubCutaneous every 24 hours  influenza   Vaccine 0.5 milliLiter(s) IntraMuscular once  lidocaine   4% Patch 1 Patch Transdermal every 24 hours  lisinopril 20 milliGRAM(s) Oral daily  meclizine 12.5 milliGRAM(s) Oral every 8 hours  NIFEdipine XL 30 milliGRAM(s) Oral daily    MEDICATIONS  (PRN):  traMADol 25 milliGRAM(s) Oral every 6 hours PRN Moderate Pain (4 - 6)    ALLERGIES:  Allergies    No Known Allergies    Intolerances    LABS:                        15.1   6.80  )-----------( 293      ( 30 Oct 2023 12:00 )             43.7     10-30    138  |  104  |  21  ----------------------------<  114<H>  4.0   |  23  |  0.93    Ca    9.4      30 Oct 2023 12:00        Urinalysis Basic - ( 30 Oct 2023 12:00 )    Color: x / Appearance: x / SG: x / pH: x  Gluc: 114 mg/dL / Ketone: x  / Bili: x / Urobili: x   Blood: x / Protein: x / Nitrite: x   Leuk Esterase: x / RBC: x / WBC x   Sq Epi: x / Non Sq Epi: x / Bacteria: x      CAPILLARY BLOOD GLUCOSE          RADIOLOGY & ADDITIONAL TESTS: Reviewed.

## 2023-10-31 LAB
ALBUMIN SERPL ELPH-MCNC: 3.8 G/DL — SIGNIFICANT CHANGE UP (ref 3.3–5)
ALBUMIN SERPL ELPH-MCNC: 3.8 G/DL — SIGNIFICANT CHANGE UP (ref 3.3–5)
ALP SERPL-CCNC: 86 U/L — SIGNIFICANT CHANGE UP (ref 40–120)
ALP SERPL-CCNC: 86 U/L — SIGNIFICANT CHANGE UP (ref 40–120)
ALT FLD-CCNC: 424 U/L — HIGH (ref 10–45)
ALT FLD-CCNC: 424 U/L — HIGH (ref 10–45)
ANION GAP SERPL CALC-SCNC: 9 MMOL/L — SIGNIFICANT CHANGE UP (ref 5–17)
ANION GAP SERPL CALC-SCNC: 9 MMOL/L — SIGNIFICANT CHANGE UP (ref 5–17)
AST SERPL-CCNC: 266 U/L — HIGH (ref 10–40)
AST SERPL-CCNC: 266 U/L — HIGH (ref 10–40)
BILIRUB SERPL-MCNC: 0.5 MG/DL — SIGNIFICANT CHANGE UP (ref 0.2–1.2)
BILIRUB SERPL-MCNC: 0.5 MG/DL — SIGNIFICANT CHANGE UP (ref 0.2–1.2)
BUN SERPL-MCNC: 18 MG/DL — SIGNIFICANT CHANGE UP (ref 7–23)
BUN SERPL-MCNC: 18 MG/DL — SIGNIFICANT CHANGE UP (ref 7–23)
CALCIUM SERPL-MCNC: 9.2 MG/DL — SIGNIFICANT CHANGE UP (ref 8.4–10.5)
CALCIUM SERPL-MCNC: 9.2 MG/DL — SIGNIFICANT CHANGE UP (ref 8.4–10.5)
CHLORIDE SERPL-SCNC: 104 MMOL/L — SIGNIFICANT CHANGE UP (ref 96–108)
CHLORIDE SERPL-SCNC: 104 MMOL/L — SIGNIFICANT CHANGE UP (ref 96–108)
CO2 SERPL-SCNC: 27 MMOL/L — SIGNIFICANT CHANGE UP (ref 22–31)
CO2 SERPL-SCNC: 27 MMOL/L — SIGNIFICANT CHANGE UP (ref 22–31)
CREAT SERPL-MCNC: 1.02 MG/DL — SIGNIFICANT CHANGE UP (ref 0.5–1.3)
CREAT SERPL-MCNC: 1.02 MG/DL — SIGNIFICANT CHANGE UP (ref 0.5–1.3)
EGFR: 85 ML/MIN/1.73M2 — SIGNIFICANT CHANGE UP
EGFR: 85 ML/MIN/1.73M2 — SIGNIFICANT CHANGE UP
GLUCOSE SERPL-MCNC: 100 MG/DL — HIGH (ref 70–99)
GLUCOSE SERPL-MCNC: 100 MG/DL — HIGH (ref 70–99)
HCT VFR BLD CALC: 41.4 % — SIGNIFICANT CHANGE UP (ref 39–50)
HCT VFR BLD CALC: 41.4 % — SIGNIFICANT CHANGE UP (ref 39–50)
HGB BLD-MCNC: 14.2 G/DL — SIGNIFICANT CHANGE UP (ref 13–17)
HGB BLD-MCNC: 14.2 G/DL — SIGNIFICANT CHANGE UP (ref 13–17)
MAGNESIUM SERPL-MCNC: 2.1 MG/DL — SIGNIFICANT CHANGE UP (ref 1.6–2.6)
MAGNESIUM SERPL-MCNC: 2.1 MG/DL — SIGNIFICANT CHANGE UP (ref 1.6–2.6)
MCHC RBC-ENTMCNC: 32.2 PG — SIGNIFICANT CHANGE UP (ref 27–34)
MCHC RBC-ENTMCNC: 32.2 PG — SIGNIFICANT CHANGE UP (ref 27–34)
MCHC RBC-ENTMCNC: 34.3 GM/DL — SIGNIFICANT CHANGE UP (ref 32–36)
MCHC RBC-ENTMCNC: 34.3 GM/DL — SIGNIFICANT CHANGE UP (ref 32–36)
MCV RBC AUTO: 93.9 FL — SIGNIFICANT CHANGE UP (ref 80–100)
MCV RBC AUTO: 93.9 FL — SIGNIFICANT CHANGE UP (ref 80–100)
NRBC # BLD: 0 /100 WBCS — SIGNIFICANT CHANGE UP (ref 0–0)
NRBC # BLD: 0 /100 WBCS — SIGNIFICANT CHANGE UP (ref 0–0)
PHOSPHATE SERPL-MCNC: 3.6 MG/DL — SIGNIFICANT CHANGE UP (ref 2.5–4.5)
PHOSPHATE SERPL-MCNC: 3.6 MG/DL — SIGNIFICANT CHANGE UP (ref 2.5–4.5)
PLATELET # BLD AUTO: 313 K/UL — SIGNIFICANT CHANGE UP (ref 150–400)
PLATELET # BLD AUTO: 313 K/UL — SIGNIFICANT CHANGE UP (ref 150–400)
POTASSIUM SERPL-MCNC: 4.3 MMOL/L — SIGNIFICANT CHANGE UP (ref 3.5–5.3)
POTASSIUM SERPL-MCNC: 4.3 MMOL/L — SIGNIFICANT CHANGE UP (ref 3.5–5.3)
POTASSIUM SERPL-SCNC: 4.3 MMOL/L — SIGNIFICANT CHANGE UP (ref 3.5–5.3)
POTASSIUM SERPL-SCNC: 4.3 MMOL/L — SIGNIFICANT CHANGE UP (ref 3.5–5.3)
PROT SERPL-MCNC: 7.6 G/DL — SIGNIFICANT CHANGE UP (ref 6–8.3)
PROT SERPL-MCNC: 7.6 G/DL — SIGNIFICANT CHANGE UP (ref 6–8.3)
RBC # BLD: 4.41 M/UL — SIGNIFICANT CHANGE UP (ref 4.2–5.8)
RBC # BLD: 4.41 M/UL — SIGNIFICANT CHANGE UP (ref 4.2–5.8)
RBC # FLD: 11.5 % — SIGNIFICANT CHANGE UP (ref 10.3–14.5)
RBC # FLD: 11.5 % — SIGNIFICANT CHANGE UP (ref 10.3–14.5)
SODIUM SERPL-SCNC: 140 MMOL/L — SIGNIFICANT CHANGE UP (ref 135–145)
SODIUM SERPL-SCNC: 140 MMOL/L — SIGNIFICANT CHANGE UP (ref 135–145)
WBC # BLD: 5.65 K/UL — SIGNIFICANT CHANGE UP (ref 3.8–10.5)
WBC # BLD: 5.65 K/UL — SIGNIFICANT CHANGE UP (ref 3.8–10.5)
WBC # FLD AUTO: 5.65 K/UL — SIGNIFICANT CHANGE UP (ref 3.8–10.5)
WBC # FLD AUTO: 5.65 K/UL — SIGNIFICANT CHANGE UP (ref 3.8–10.5)

## 2023-10-31 PROCEDURE — 99233 SBSQ HOSP IP/OBS HIGH 50: CPT

## 2023-10-31 PROCEDURE — 76705 ECHO EXAM OF ABDOMEN: CPT | Mod: 26

## 2023-10-31 PROCEDURE — 99232 SBSQ HOSP IP/OBS MODERATE 35: CPT

## 2023-10-31 RX ORDER — ATORVASTATIN CALCIUM 80 MG/1
40 TABLET, FILM COATED ORAL AT BEDTIME
Refills: 0 | Status: DISCONTINUED | OUTPATIENT
Start: 2023-10-31 | End: 2023-11-01

## 2023-10-31 RX ORDER — ATORVASTATIN CALCIUM 80 MG/1
40 TABLET, FILM COATED ORAL AT BEDTIME
Refills: 0 | Status: DISCONTINUED | OUTPATIENT
Start: 2023-10-31 | End: 2023-10-31

## 2023-10-31 RX ADMIN — Medication 12.5 MILLIGRAM(S): at 17:30

## 2023-10-31 RX ADMIN — CLOPIDOGREL BISULFATE 75 MILLIGRAM(S): 75 TABLET, FILM COATED ORAL at 11:29

## 2023-10-31 RX ADMIN — Medication 81 MILLIGRAM(S): at 11:29

## 2023-10-31 RX ADMIN — LISINOPRIL 20 MILLIGRAM(S): 2.5 TABLET ORAL at 06:05

## 2023-10-31 RX ADMIN — ATORVASTATIN CALCIUM 40 MILLIGRAM(S): 80 TABLET, FILM COATED ORAL at 21:59

## 2023-10-31 RX ADMIN — Medication 30 MILLIGRAM(S): at 06:05

## 2023-10-31 RX ADMIN — Medication 12.5 MILLIGRAM(S): at 06:05

## 2023-10-31 RX ADMIN — ENOXAPARIN SODIUM 40 MILLIGRAM(S): 100 INJECTION SUBCUTANEOUS at 17:30

## 2023-10-31 RX ADMIN — Medication 12.5 MILLIGRAM(S): at 21:59

## 2023-10-31 NOTE — CONSULT NOTE ADULT - ASSESSMENT
56 y/o M pmhx significant for prediabetes, HTN who initially presented to [facility] for complaints of dizziness for 1 week, he was further admitted for workup for suspicion of CVA.  GI consulted due to elevated LFTs, no baseline LFTs reported. As per patient, has no prior history or family history of liver disease.    ALT predominant elevation  R factor 14.8, likely hepatocellular injury    Recommendations:  -Obtain Abdominal US  -F/u viral hepatitis serology  -F/u LANETTE, igG 58 y/o M pmhx significant for prediabetes, HTN who initially presented to [facility] for complaints of dizziness for 1 week, he was further admitted for workup for suspicion of CVA.  GI consulted due to elevated LFTs, no baseline LFTs reported. As per patient, has no prior history or family history of liver disease.     ALT predominant elevation  R factor 14.8, likely hepatocellular injury    Recommendations:  -Obtain Abdominal US  -F/u viral hepatitis serology  -F/u LANETTE, igG 56 y/o M pmhx significant for prediabetes, HTN who initially presented to [facility] for complaints of dizziness for 1 week, he was further admitted for workup for suspicion of CVA.  GI consulted due to elevated LFTs, no baseline LFTs reported. As per patient, has no prior history or family history of liver disease.     ALT predominant elevation, likely due to hydralazine use vs cholestatic vs alcoholic hepatitis (less likely due to ALT>AST), unable to obtain baseline LFTs  R factor 14.8      Recommendations:  -Avoid Hydralazine  -Can Resume Statin  -Obtain Abdominal US  -Advise patient to stop drinking   58 y/o M pmhx significant for prediabetes, HTN who initially presented to [facility] for complaints of dizziness for 1 week, he was further admitted for workup for suspicion of CVA.    GI consulted due to elevated LFTs, no baseline LFTs reported or checked upon admission, so difficult to ascertain the chronicity of patient's elevated liver enzymes. As per patient, has no prior history or family history of liver disease. ALT predominant elevation, likely due to hydralazine use vs cholestatic vs alcoholic hepatitis (less likely due to ALT>AST), unable to obtain baseline LFTs. R factor 14.8.     Recommendations:  - avoid further use of hydralazine  - okay to resume statin as unlikely statin induced  - obtain abdominal US  - counseled extensively to avoid further alcohol use (reports he drinks a pack of beer every weekend)     Case discussed with Dr. John. Hepatology Team will continue to follow.     Laurie Villela D.O.   Gastroenterology Fellow  Weekday 7am-5pm Pager: [phone]  Weeknights/Weekend/Holiday Coverage: Please call the  for contact info

## 2023-10-31 NOTE — CONSULT NOTE ADULT - CONSULT REQUESTED DATE/TIME
21-Oct-2023 14:00
25-Oct-2023 14:00
22-Oct-2023 17:19
31-Oct-2023 15:14
23-Oct-2023 05:05
25-Oct-2023 16:20

## 2023-10-31 NOTE — PROGRESS NOTE ADULT - ASSESSMENT
Neurology    57 year old man w/ PMH of HTN (non compliant) presented w/ intermittent roomspinning dizziness. On exam, patient w/ unidirectional R. beating nystagmus on horizontal and vertical gaze, otherwise normal.     CTH negative  CTA h/n demonstrated multifocal ICAD, including severe stenosis of the L. A2, severe stenosis of the L. M2/M3, L > R. supraclinoid ICA stenosis.   CTP demonstrates Tmax >6s delay of 53cc in both hemispheres  MR brain demonstrates punctate infarction involving the R. white matter and L. centrum semiovale. Multiple chronic lacunar infarctions; Multiple +SWI microhemorrhages involving both cerebellar hemispheres, and bihemispheric.   A1c 5.7     ESR wnl; CRP wnl   RF 20 (mildly elevated)   Utox negative  TTE EF wnl; Moderate LVH; Normal LA; No PFO.   DSA +multifocal ICAD but without classic beading appearance that would suggest vasculitis.   JONNY +pulmonary AVM  LE US neg    Impression:   1. Intermittent vertigo exacerbated by head movements w/ unidirectional right-beating nystagmus -- etiology likely secondary to BPPV.   2. Incidental bilateral cerebral infarction in setting of multifocal intracranial stenosis -- mechanism of stroke likely intracranial large artery atherosclerotic disease. Less likely vasculitis given DSA findings and clinical history.     Plan:   ASA 81mg + Plavix 75mg for three months followed by ASA 81mg indefinitely per [facility]   Gradual normotension   s/p ILR   Lovenox SQ for DVT ppx   PT - AR  Pulmonary follow up outpatient given pulmonary AVM 57 year old man w/ PMH of HTN (non compliant) presented w/ intermittent roomspinning dizziness. On exam, patient w/ unidirectional R. beating nystagmus on horizontal and vertical gaze, otherwise normal.     CTH negative  CTA h/n demonstrated multifocal ICAD, including severe stenosis of the L. A2, severe stenosis of the L. M2/M3, L > R. supraclinoid ICA stenosis.   CTP demonstrates Tmax >6s delay of 53cc in both hemispheres  MR brain demonstrates punctate infarction involving the R. white matter and L. centrum semiovale. Multiple chronic lacunar infarctions; Multiple +SWI microhemorrhages involving both cerebellar hemispheres, and bihemispheric.   A1c 5.7     ESR wnl; CRP wnl   RF 20 (mildly elevated)   Utox negative  TTE EF wnl; Moderate LVH; Normal LA; No PFO.   DSA +multifocal ICAD but without classic beading appearance that would suggest vasculitis.   JONNY +pulmonary AVM  LE US neg    Impression:   1. Intermittent vertigo exacerbated by head movements w/ unidirectional right-beating nystagmus -- etiology likely secondary to BPPV.   2. Incidental bilateral cerebral infarction in setting of multifocal intracranial stenosis -- mechanism of stroke likely intracranial large artery atherosclerotic disease. Less likely vasculitis given DSA findings and clinical history.     Plan:   ASA 81mg + Plavix 75mg for three months followed by ASA 81mg indefinitely per [facility]   Gradual normotension   s/p ILR   Lovenox SQ for DVT ppx   PT - AR  Pulmonary follow up outpatient given pulmonary AVM

## 2023-10-31 NOTE — PROGRESS NOTE ADULT - SUBJECTIVE AND OBJECTIVE BOX
Feeling okay today.  Denies any nausea, vomiting, abdominal pain or other new symptoms.  Pending discharge to acute rehab.  Informed him of the elevated liver tests, and that we will be doing some additional testing.     Remaining ROS negative     PHYSICAL EXAM:    General: no acute distress, sitting up in bed  HEENT: NC/AT; MMM  Cardiovascular: +S1/S2, RRR  Respiratory: CTA B/L; no W/R/R  Gastrointestinal: soft, NT/ND; +BSx4  Extremities: WWP; no edema  Neurological: no acute deficits noted, denies any vision issues or headache.   Psychiatric: pleasant mood and affect  Dermatologic: no appreciable wounds or damage to the skin    VITAL SIGNS:  Vital Signs Last 24 Hrs  T(C): 36.7 (31 Oct 2023 11:38), Max: 36.7 (30 Oct 2023 20:30)  T(F): 98 (31 Oct 2023 11:38), Max: 98 (30 Oct 2023 20:30)  HR: 93 (31 Oct 2023 11:38) (66 - 93)  BP: 129/73 (31 Oct 2023 11:38) (129/73 - 158/99)  BP(mean): --  RR: 18 (31 Oct 2023 11:38) (18 - 18)  SpO2: 95% (31 Oct 2023 11:38) (94% - 96%)    Parameters below as of 31 Oct 2023 11:38  Patient On (Oxygen Delivery Method): room air      MEDICATIONS:  MEDICATIONS  (STANDING):  aspirin enteric coated 81 milliGRAM(s) Oral daily  clopidogrel Tablet 75 milliGRAM(s) Oral daily  enoxaparin Injectable 40 milliGRAM(s) SubCutaneous every 24 hours  influenza   Vaccine 0.5 milliLiter(s) IntraMuscular once  lidocaine   4% Patch 1 Patch Transdermal every 24 hours  lisinopril 20 milliGRAM(s) Oral daily  meclizine 12.5 milliGRAM(s) Oral every 8 hours  NIFEdipine XL 30 milliGRAM(s) Oral daily    MEDICATIONS  (PRN):  traMADol 25 milliGRAM(s) Oral every 6 hours PRN Moderate Pain (4 - 6)      ALLERGIES:  Allergies    No Known Allergies    Intolerances        LABS:                        14.2   5.65  )-----------( 313      ( 31 Oct 2023 05:30 )             41.4     10-31    140  |  104  |  18  ----------------------------<  100<H>  4.3   |  27  |  1.02    Ca    9.2      31 Oct 2023 05:30  Phos  3.6     10-31  Mg     2.1     10-31    TPro  7.6  /  Alb  3.8  /  TBili  0.5  /  DBili  x   /  AST  266<H>  /  ALT  424<H>  /  AlkPhos  86  10-31      Urinalysis Basic - ( 31 Oct 2023 05:30 )    Color: x / Appearance: x / SG: x / pH: x  Gluc: 100 mg/dL / Ketone: x  / Bili: x / Urobili: x   Blood: x / Protein: x / Nitrite: x   Leuk Esterase: x / RBC: x / WBC x   Sq Epi: x / Non Sq Epi: x / Bacteria: x      CAPILLARY BLOOD GLUCOSE          RADIOLOGY & ADDITIONAL TESTS: Reviewed.

## 2023-10-31 NOTE — PROGRESS NOTE ADULT - SUBJECTIVE AND OBJECTIVE BOX
Physical Medicine and Rehabilitation Progress Note :       Patient is a 57y old  Male who presents with a chief complaint of DIZZINESS     (23 Oct 2023 13:24)      HPI:  HPI: 57y Male with PMHx of HTN (noncompliant with meds) originally presented to [facility] ED c/o room spinning dizziness x 1 week worse with head movement. CTH with no acute intracranial injury. Microvascular disease. Chronic lacunar infarcts. CTA head and neck with mild dot-appearance to the bilateral ACAs and MCAs that may represent vasculitis. Short severe segmental narrowing of the left proximal A2 segment of the anterior cerebral artery. Severe focal narrowing of the left M2/M3 segment of the middle cerebral artery. Normal CTA of the extracranial circulation. No evidence of carotid stenosis. Was given total of 30mg labetalol and 30mg hydralazine with SBP still 230 and so cardene gtt was started. Given imaging findings, stroke was consulted. Upon initial examination, NIHSS 0. Was awaiting MICU bed for admission under the stroke service for a stroke workup.    A few mins later received call from patient's nurse that patient was now confused and was not speaking as much. BP at the time 130s. Upon my eval, NIHSS 2 for confusion/aphasia (unable to say where he is or the year) and hence stroke code was called. CT imaging unchanged. Exam slowly improving with SBP in the low 200s. Discussed case with Dr. Myles, since exam is improving and no change in imaging, likely symptoms are due to significant drop in BP.    T(C): 36.6 (10-21-23 @ 11:37), Max: 36.6 (10-21-23 @ 11:37)  HR: 95 (10-21-23 @ 18:20) (78 - 112)  BP: 233/138 (10-21-23 @ 18:20) (130/89 - 236/141)  RR: 20 (10-21-23 @ 18:20) (16 - 28)  SpO2: 98% (10-21-23 @ 18:20) (96% - 100%)    PAST MEDICAL & SURGICAL HISTORY:  HTN (hypertension)    FAMILY HISTORY:   (21 Oct 2023 19:58)                            14.2   5.65  )-----------( 313      ( 31 Oct 2023 05:30 )             41.4       10-31    140  |  104  |  18  ----------------------------<  100<H>  4.3   |  27  |  1.02    Ca    9.2      31 Oct 2023 05:30  Phos  3.6     10-31  Mg     2.1     10-31    TPro  7.6  /  Alb  3.8  /  TBili  0.5  /  DBili  x   /  AST  266<H>  /  ALT  424<H>  /  AlkPhos  86  10-31    Vital Signs Last 24 Hrs  T(C): 36.7 (31 Oct 2023 11:38), Max: 36.7 (30 Oct 2023 20:30)  T(F): 98 (31 Oct 2023 11:38), Max: 98 (30 Oct 2023 20:30)  HR: 93 (31 Oct 2023 11:38) (66 - 93)  BP: 129/73 (31 Oct 2023 11:38) (127/91 - 158/99)  BP(mean): --  RR: 18 (31 Oct 2023 11:38) (18 - 18)  SpO2: 95% (31 Oct 2023 11:38) (94% - 97%)    Parameters below as of 31 Oct 2023 11:38  Patient On (Oxygen Delivery Method): room air        MEDICATIONS  (STANDING):  aspirin enteric coated 81 milliGRAM(s) Oral daily  atorvastatin 40 milliGRAM(s) Oral at bedtime  clopidogrel Tablet 75 milliGRAM(s) Oral daily  enoxaparin Injectable 40 milliGRAM(s) SubCutaneous every 24 hours  influenza   Vaccine 0.5 milliLiter(s) IntraMuscular once  lidocaine   4% Patch 1 Patch Transdermal every 24 hours  lisinopril 20 milliGRAM(s) Oral daily  meclizine 12.5 milliGRAM(s) Oral every 8 hours  NIFEdipine XL 30 milliGRAM(s) Oral daily    MEDICATIONS  (PRN):  traMADol 25 milliGRAM(s) Oral every 6 hours PRN Moderate Pain (4 - 6)         10/30/2023 Functional Status Assessment :       Pain Assessment/Number Scale (0-10) Adult  Presence of Pain: denies pain/discomfort (Rating = 0)  Pain Rating (0-10): Rest: 0 (no pain/absence of nonverbal indicators of pain)  Pain Rating (0-10): Activity: 0 (no pain/absence of nonverbal indicators of pain)    Safety      AM-PAC Functional Assessment: Basic Mobility  Type of Assessment: Daily assessment  Turning from your back to your side while in a flat bed without using bedrails?: 4 = No assist / stand by assistance  Moving from lying on your back to sitting on the flat side of a flat bed without using bedrails?: 4 = No assist / stand by assistance  Moving to and from a bed to a chair (including a wheelchair)?: 3 = A little assistance  Standing up from a chair using your arms (e.g. wheelchair or bedside chair)?: 3 = A little assistance  Walking in hospital room?: 3 = A little assistance  Climbing 3-5 steps with a railing?: 3 = A little assistance  Score: 20   Row Comment: Ask the patient "How much help from another person do you currently need? (If the patient hasn't done an activity recently, how much help from another person do you think he/she needs if he/she tried?)    Cognitive/Neuro      Cognitive/Neuro/Behavioral  Cognitive/Neuro/Behavioral [WDL Definition: Alert; opens eyes spontaneously; arouses to voice or touch; oriented x 4; follows commands; speech spontaneous, logical; purposeful motor response; behavior appropriate to situation]: WDL    Language Assistance  Preferred Language to Address Healthcare Preferred Language to Address Healthcare: Micronesian    Therapeutic Interventions      Bed Mobility  Bed Mobility Training Sit-to-Supine: verbal cues;  1 person assist;  contact guard  Bed Mobility Training Supine-to-Sit: contact guard;  verbal cues;  1 person assist  Bed Mobility Training Limitations: decreased ability to use legs for bridging/pushing;  impaired ability to control trunk for mobility;  decreased strength;  impaired balance;  impaired postural control    Sit-Stand Transfer Training  Transfer Training Sit-to-Stand Transfer: verbal cues;  1 person assist;  rolling walker;  contact guard  Transfer Training Stand-to-Sit Transfer: contact guard;  verbal cues;  1 person assist;  rolling walker  Sit-to-Stand Transfer Training Transfer Safety Analysis: decreased balance;  decreased strength;  impaired balance;  impaired postural control    Gait Training  Gait Training: contact guard;  verbal cues;  1 person assist;  rolling walker;  50 ft x2   Gait Analysis: 3-point gait   decreased step length;  decreased stride length;  ataxic;  decreased strength;  impaired balance  Gait Number of Times:: x 2  Type of Rest Type of Rest: standing  Duration of Rest Duration of Rest: 45 sec       AM-PAC Functional Assessment: Daily Activity  Type of Assessment: Daily assessment  Putting on and taking off regular lower body clothing?: 3 = A little assistance  Bathing (including washing, rinsing, drying)?: 3 = A little assistance  Toileting, which includes using toilet, bedpan or urinal?: 3 = A little assistance  Putting on and taking off regular upper body clothing?: 3 = A little assistance  Take care of personal grooming such as brushing teeth?: 4 = No assist / stand by assistance  Eating meals?: 4 = No assist / stand by assistance  Score: 20   Row Comment: Ask the patient "How much help from another person do you currently need? (If the patient hasn't done an activity recently, how much help from another person do you think he/she needs if he/she tried?)    Cognitive/Neuro      Cognitive/Neuro/Behavioral  Level of Consciousness: alert  Arousal Level: arouses to voice  Orientation: oriented x 4  Speech: clear;  spontaneous  Mood/Behavior: calm;  cooperative    Language Assistance  Preferred Language to Address Healthcare Preferred Language to Address Healthcare: English    Therapeutic Interventions      Bed Mobility  Bed Mobility Training Rolling/Turning: stand-by assist;  1 person assist  Bed Mobility Training Scooting: stand-by assist;  1 person assist  Bed Mobility Training Bridging: contact guard;  1 person assist;  verbal cues  Bed Mobility Training Sit-to-Supine: contact guard;  1 person assist  Bed Mobility Training Supine-to-Sit: contact guard;  1 person assist  Bed Mobility Training Limitations: impaired postural control;  impaired balance;  decreased strength;  decreased flexibility;  impaired motor control    Sit-Stand Transfer Training  Transfer Training Sit-to-Stand Transfer: contact guard;  1 person assist;  verbal cues;  rolling walker  Transfer Training Stand-to-Sit Transfer: contact guard;  1 person assist;  verbal cues;  rolling walker  Sit-to-Stand Transfer Training Transfer Safety Analysis: decreased flexibility;  decreased strength;  impaired balance;  impaired motor control;  impaired postural control    Therapeutic Exercise  Therapeutic Exercise Detail: functional mobility training with pt able to ambulate in room/hallway with CGAx1 using RW, demo ataxic gait with impaired balance throughout.     Lower Body Dressing Training  Lower Body Dressing Training Assistance: contact guard;  1 person assist;  doff/don B socks sitting EOB;  decreased ROM;  decreased strength;  impaired balance;  impaired motor control;  impaired postural control    Upper Body Dressing Training  Upper Body Dressing Training Assistance: contact guard;  1 person assist;  verbal cues;  don/doff back gown while sitting EOB;  decreased strength;  impaired balance;  decreased flexibility;  impaired postural control;  impaired motor control            PM&R Impression : as above    Current disposition plan recommendation :    acute rehab placement

## 2023-10-31 NOTE — PROGRESS NOTE ADULT - SUBJECTIVE AND OBJECTIVE BOX
OVERNIGHT EVENTS: AYESHA    SUBJECTIVE / INTERVAL HPI: Patient seen and examined at bedside. Reports he is feeling fine, denies any dizziness currently. Denies headache, chest pain, sob, fever, chills, n/v/d.     VITAL SIGNS:  Vital Signs Last 24 Hrs  T(C): 36.5 (31 Oct 2023 06:01), Max: 36.7 (30 Oct 2023 20:30)  T(F): 97.7 (31 Oct 2023 06:01), Max: 98 (30 Oct 2023 20:30)  HR: 66 (31 Oct 2023 06:01) (66 - 91)  BP: 158/99 (31 Oct 2023 06:01) (127/91 - 170/106)  BP(mean): --  RR: 18 (30 Oct 2023 20:30) (18 - 18)  SpO2: 94% (31 Oct 2023 06:01) (94% - 97%)    Parameters below as of 31 Oct 2023 06:01  Patient On (Oxygen Delivery Method): room air    PHYSICAL EXAM:    General: NAD  HEENT: NC/AT; PERRL, anicteric sclera; MMM  Neck: supple w/o palpable nodularity  Cardiovascular: +S1/S2; RRR  Respiratory: CTA B/L; no W/R/R  Gastrointestinal: soft, NT/ND; +BSx4  Extremities: WWP; no edema, clubbing or cyanosis  Vascular: 2+ radial, DP/PT pulses B/L  Neurological: AAOx3; no focal deficits    MEDICATIONS:  MEDICATIONS  (STANDING):  aspirin enteric coated 81 milliGRAM(s) Oral daily  atorvastatin 80 milliGRAM(s) Oral at bedtime  clopidogrel Tablet 75 milliGRAM(s) Oral daily  enoxaparin Injectable 40 milliGRAM(s) SubCutaneous every 24 hours  influenza   Vaccine 0.5 milliLiter(s) IntraMuscular once  lidocaine   4% Patch 1 Patch Transdermal every 24 hours  lisinopril 20 milliGRAM(s) Oral daily  meclizine 12.5 milliGRAM(s) Oral every 8 hours  NIFEdipine XL 30 milliGRAM(s) Oral daily    MEDICATIONS  (PRN):  traMADol 25 milliGRAM(s) Oral every 6 hours PRN Moderate Pain (4 - 6)      ALLERGIES:  Allergies    No Known Allergies    Intolerances        LABS:                        14.2   5.65  )-----------( 313      ( 31 Oct 2023 05:30 )             41.4     10-30    138  |  104  |  21  ----------------------------<  114<H>  4.0   |  23  |  0.93    Ca    9.4      30 Oct 2023 12:00        Urinalysis Basic - ( 30 Oct 2023 12:00 )    Color: x / Appearance: x / SG: x / pH: x  Gluc: 114 mg/dL / Ketone: x  / Bili: x / Urobili: x   Blood: x / Protein: x / Nitrite: x   Leuk Esterase: x / RBC: x / WBC x   Sq Epi: x / Non Sq Epi: x / Bacteria: x      CAPILLARY BLOOD GLUCOSE          RADIOLOGY & ADDITIONAL TESTS: Reviewed.

## 2023-10-31 NOTE — PROGRESS NOTE ADULT - ATTENDING COMMENTS
The patient is a 57-year-old male with a history of hypertension and HLD admitted with intermittent vertigo that is most consistent with peripheral etiology.  However, MRI showed bilateral punctate regions of acute ischemia in the background of severe small vessel disease/microhemorrhages likely reflection of poorly controlled hypertension.  CTA/DSA notable for multifocal ICAD.  TTE/JONNY unrevealing.  Status post ILR.  Plan for DAPT for 3 months then aspirin monotherapy.  Continue high intensity statin. Gradual reduction of BP. Pending placement for rehab.  Agree w plan for elevated LFTs per IM/GI The patient is a 57-year-old male with a history of hypertension and HLD admitted with intermittent vertigo that is most consistent with peripheral etiology.  However, MRI showed bilateral punctate regions of acute ischemia in the background of severe small vessel disease/microhemorrhages likely reflection of poorly controlled hypertension.  CTA/DSA notable for multifocal ICAD.  TTE/JONNY unrevealing.  Status post ILR.  Plan for DAPT for 3 months then aspirin monotherapy.  Continue high intensity statin but will reduce dose due ot elevated LFTs. Gradual reduction of BP. Pending placement for rehab.  Agree w plan for elevated LFTs per IM/GI

## 2023-10-31 NOTE — PROGRESS NOTE ADULT - PROBLEM SELECTOR PLAN 3
BP goal per Neuro.    Plan:  -Gradual normotension   -Continue DASH diet  -Lisinopril 20mg qhs  -Switched from labetalol to nifedipine 30mg daily

## 2023-10-31 NOTE — PROGRESS NOTE ADULT - ASSESSMENT
57 with PMH of prediabetes, HTN presenting with CVA, and admitted to stroke service for further workup.     # Cerebrovascular Accident   - MRI shows punctate foci of acute ischemia within the right periatrial white matter and left centrum semiovale. He has ICAD on angiogram.  - S/p ILR placement   - rest of plan per stroke team.     # Acute Pain in Left Knee   - X-ray showed medial knee joint mild to moderate effusion without any acute fracture or dislocation.  - continue with current pain control, RICE, per orthopedic team.     # Essential Hypertension   - lisinopril 20 milligrams daily, and nifedipine 30 milligrams daily.   - Blood pressure regimen can be adjusted while patient is at rehab and also in the outpatient setting.    # Elevated TSH   - Free T4 levels are normal.   - Repeat TFTs in four to six weeks. Outpatient.    # Prediabetes   - Conditional blood glucose monitoring.   - Recommend lifestyle modification and outpatient follow-up.    # Transaminitis   - Patient was noted to have elevated AST and ALT. ALT > AST, which can be consistent with liver injury from atorvastatin.  Hepatitis panel checked on admission, and was negative.  had mildly positive LANETTE, and ANCA (which was ordered as part of vasculitis workup per neurology). No baseline LFTs.    - He denies any abdominal pain, but we will obtain a right upper quadrant ultrasound to further characterize the nature of the transaminitis.  - recommend holding statin, and discussing with hepatology about next steps.      35 minutes spent on this encounter, including face to face with patient, care coordination and documentation.  Plan of care discussed with stroke team.

## 2023-10-31 NOTE — CONSULT NOTE ADULT - SUBJECTIVE AND OBJECTIVE BOX
HPI:  HPI: 57y Male with PMHx of prediabetes, HTN (noncompliant with meds) originally presented to [facility] with complaints of dizziness for 1 week worse with head movement. Pt was admitted to stroke service for suspicion of CVA. He denies family history of liver disease. Reports he last saw his PCP 1/2023 however he is unable to recall if he had elevated LFTs at that time.  Pt denies current tobacco use, states he used to smoke many years ago. He admits to alcohol use in the weekends, about 6 beers a day. Pt denies abdominal pain, nausea, vomiting, constipation, diarrhea, abdominal distension, SOB, CP. Denies OTC medication use, herbal teas, supplement usage.       T(C): 36.6 (10-21-23 @ 11:37), Max: 36.6 (10-21-23 @ 11:37)  HR: 95 (10-21-23 @ 18:20) (78 - 112)  BP: 233/138 (10-21-23 @ 18:20) (130/89 - 236/141)  RR: 20 (10-21-23 @ 18:20) (16 - 28)  SpO2: 98% (10-21-23 @ 18:20) (96% - 100%)    PAST MEDICAL & SURGICAL HISTORY:  HTN (hypertension)    FAMILY HISTORY:   (21 Oct 2023 19:58)    Allergies    No Known Allergies    Intolerances      Home Medications:    MEDICATIONS:  MEDICATIONS  (STANDING):  aspirin enteric coated 81 milliGRAM(s) Oral daily  clopidogrel Tablet 75 milliGRAM(s) Oral daily  enoxaparin Injectable 40 milliGRAM(s) SubCutaneous every 24 hours  influenza   Vaccine 0.5 milliLiter(s) IntraMuscular once  lidocaine   4% Patch 1 Patch Transdermal every 24 hours  lisinopril 20 milliGRAM(s) Oral daily  meclizine 12.5 milliGRAM(s) Oral every 8 hours  NIFEdipine XL 30 milliGRAM(s) Oral daily    MEDICATIONS  (PRN):  traMADol 25 milliGRAM(s) Oral every 6 hours PRN Moderate Pain (4 - 6)    PAST MEDICAL & SURGICAL HISTORY:  HTN (hypertension)        FAMILY HISTORY:    SOCIAL HISTORY:  Tobacoo: [ ] Current, [ ] Former, [ ] Never; Pack Years:  Alcohol: 6 beers a day on weekends  Illicit Drugs: Denies     REVIEW OF SYSTEMS:  All other 10 review of systems is negative unless indicated above.    Vital Signs Last 24 Hrs  T(C): 36.7 (31 Oct 2023 11:38), Max: 36.7 (30 Oct 2023 20:30)  T(F): 98 (31 Oct 2023 11:38), Max: 98 (30 Oct 2023 20:30)  HR: 93 (31 Oct 2023 11:38) (66 - 93)  BP: 129/73 (31 Oct 2023 11:38) (129/73 - 158/99)  BP(mean): --  RR: 18 (31 Oct 2023 11:38) (18 - 18)  SpO2: 95% (31 Oct 2023 11:38) (94% - 96%)    Parameters below as of 31 Oct 2023 11:38  Patient On (Oxygen Delivery Method): room air      PHYSICAL EXAM:    General: Well developed; well nourished; in no acute distress  Eyes: Anicteric sclerae, moist conjunctivae  HENT: Moist mucous membranes  Neck: Trachea midline, supple  Lungs: Normal respiratory effort and no intercostal retractions  Cardiovascular: RRR  Abdomen: Soft, non-tender non-distended; Normal bowel sounds; No rebound or guarding  Extremities: Normal range of motion, No clubbing, cyanosis or edema  Neurological: Alert and oriented x3  Skin: Warm and dry. No obvious rash    LABS:                        14.2   5.65  )-----------( 313      ( 31 Oct 2023 05:30 )             41.4     10-31    140  |  104  |  18  ----------------------------<  100<H>  4.3   |  27  |  1.02    Ca    9.2      31 Oct 2023 05:30  Phos  3.6     10-31  Mg     2.1     10-31    TPro  7.6  /  Alb  3.8  /  TBili  0.5  /  DBili  x   /  AST  266<H>  /  ALT  424<H>  /  AlkPhos  86  10-31            RADIOLOGY & ADDITIONAL STUDIES:    HPI:  HPI: 57y Male with PMHx of prediabetes, HTN (noncompliant with meds) originally presented to [facility] with complaints of dizziness for 1 week worse with head movement. Pt was admitted to stroke service for suspicion of CVA. He denies family history of liver disease. Reports he last saw his PCP 1/2023 however he is unable to recall if he had elevated LFTs at that time.  Pt denies current tobacco use, states he used to smoke many years ago. He admits to alcohol use in the weekends, about 6 beers a day. Pt denies abdominal pain, nausea, vomiting, constipation, diarrhea, abdominal distension, SOB, CP. Denies OTC medication use, herbal teas, supplement usage. GI consulted for elevated liver enzymes.       T(C): 36.6 (10-21-23 @ 11:37), Max: 36.6 (10-21-23 @ 11:37)  HR: 95 (10-21-23 @ 18:20) (78 - 112)  BP: 233/138 (10-21-23 @ 18:20) (130/89 - 236/141)  RR: 20 (10-21-23 @ 18:20) (16 - 28)  SpO2: 98% (10-21-23 @ 18:20) (96% - 100%)    PAST MEDICAL & SURGICAL HISTORY:  HTN (hypertension)    FAMILY HISTORY:   (21 Oct 2023 19:58)    Allergies    No Known Allergies    Intolerances      Home Medications:    MEDICATIONS:  MEDICATIONS  (STANDING):  aspirin enteric coated 81 milliGRAM(s) Oral daily  clopidogrel Tablet 75 milliGRAM(s) Oral daily  enoxaparin Injectable 40 milliGRAM(s) SubCutaneous every 24 hours  influenza   Vaccine 0.5 milliLiter(s) IntraMuscular once  lidocaine   4% Patch 1 Patch Transdermal every 24 hours  lisinopril 20 milliGRAM(s) Oral daily  meclizine 12.5 milliGRAM(s) Oral every 8 hours  NIFEdipine XL 30 milliGRAM(s) Oral daily    MEDICATIONS  (PRN):  traMADol 25 milliGRAM(s) Oral every 6 hours PRN Moderate Pain (4 - 6)    PAST MEDICAL & SURGICAL HISTORY:  HTN (hypertension)        FAMILY HISTORY:    SOCIAL HISTORY:  Tobacoo: [ ] Current, [ ] Former, [ ] Never; Pack Years:  Alcohol: 6 beers a day on weekends  Illicit Drugs: Denies     REVIEW OF SYSTEMS:  All other 10 review of systems is negative unless indicated above.    Vital Signs Last 24 Hrs  T(C): 36.7 (31 Oct 2023 11:38), Max: 36.7 (30 Oct 2023 20:30)  T(F): 98 (31 Oct 2023 11:38), Max: 98 (30 Oct 2023 20:30)  HR: 93 (31 Oct 2023 11:38) (66 - 93)  BP: 129/73 (31 Oct 2023 11:38) (129/73 - 158/99)  BP(mean): --  RR: 18 (31 Oct 2023 11:38) (18 - 18)  SpO2: 95% (31 Oct 2023 11:38) (94% - 96%)    Parameters below as of 31 Oct 2023 11:38  Patient On (Oxygen Delivery Method): room air      PHYSICAL EXAM:    General: Well developed; well nourished; in no acute distress  Eyes: Anicteric sclerae, moist conjunctivae  HENT: Moist mucous membranes  Neck: Trachea midline, supple  Lungs: Normal respiratory effort and no intercostal retractions  Cardiovascular: RRR  Abdomen: Soft, non-tender non-distended; Normal bowel sounds; No rebound or guarding  Extremities: Normal range of motion, No clubbing, cyanosis or edema  Neurological: Alert and oriented x3  Skin: Warm and dry. No obvious rash    LABS:                        14.2   5.65  )-----------( 313      ( 31 Oct 2023 05:30 )             41.4     10-31    140  |  104  |  18  ----------------------------<  100<H>  4.3   |  27  |  1.02    Ca    9.2      31 Oct 2023 05:30  Phos  3.6     10-31  Mg     2.1     10-31    TPro  7.6  /  Alb  3.8  /  TBili  0.5  /  DBili  x   /  AST  266<H>  /  ALT  424<H>  /  AlkPhos  86  10-31            RADIOLOGY & ADDITIONAL STUDIES:

## 2023-11-01 ENCOUNTER — TRANSCRIPTION ENCOUNTER (OUTPATIENT)
Age: 58
End: 2023-11-01

## 2023-11-01 VITALS
TEMPERATURE: 98 F | OXYGEN SATURATION: 95 % | RESPIRATION RATE: 16 BRPM | HEART RATE: 68 BPM | DIASTOLIC BLOOD PRESSURE: 93 MMHG | SYSTOLIC BLOOD PRESSURE: 141 MMHG

## 2023-11-01 DIAGNOSIS — R74.01 ELEVATION OF LEVELS OF LIVER TRANSAMINASE LEVELS: ICD-10-CM

## 2023-11-01 LAB
ALBUMIN SERPL ELPH-MCNC: 3.9 G/DL — SIGNIFICANT CHANGE UP (ref 3.3–5)
ALBUMIN SERPL ELPH-MCNC: 3.9 G/DL — SIGNIFICANT CHANGE UP (ref 3.3–5)
ALP SERPL-CCNC: 68 U/L — SIGNIFICANT CHANGE UP (ref 40–120)
ALP SERPL-CCNC: 68 U/L — SIGNIFICANT CHANGE UP (ref 40–120)
ALT FLD-CCNC: 269 U/L — HIGH (ref 10–45)
ALT FLD-CCNC: 269 U/L — HIGH (ref 10–45)
ANION GAP SERPL CALC-SCNC: 7 MMOL/L — SIGNIFICANT CHANGE UP (ref 5–17)
ANION GAP SERPL CALC-SCNC: 7 MMOL/L — SIGNIFICANT CHANGE UP (ref 5–17)
AST SERPL-CCNC: 89 U/L — HIGH (ref 10–40)
AST SERPL-CCNC: 89 U/L — HIGH (ref 10–40)
BASOPHILS # BLD AUTO: 0.05 K/UL — SIGNIFICANT CHANGE UP (ref 0–0.2)
BASOPHILS # BLD AUTO: 0.05 K/UL — SIGNIFICANT CHANGE UP (ref 0–0.2)
BASOPHILS NFR BLD AUTO: 0.7 % — SIGNIFICANT CHANGE UP (ref 0–2)
BASOPHILS NFR BLD AUTO: 0.7 % — SIGNIFICANT CHANGE UP (ref 0–2)
BILIRUB SERPL-MCNC: 0.5 MG/DL — SIGNIFICANT CHANGE UP (ref 0.2–1.2)
BILIRUB SERPL-MCNC: 0.5 MG/DL — SIGNIFICANT CHANGE UP (ref 0.2–1.2)
BUN SERPL-MCNC: 17 MG/DL — SIGNIFICANT CHANGE UP (ref 7–23)
BUN SERPL-MCNC: 17 MG/DL — SIGNIFICANT CHANGE UP (ref 7–23)
CALCIUM SERPL-MCNC: 9.5 MG/DL — SIGNIFICANT CHANGE UP (ref 8.4–10.5)
CALCIUM SERPL-MCNC: 9.5 MG/DL — SIGNIFICANT CHANGE UP (ref 8.4–10.5)
CHLORIDE SERPL-SCNC: 101 MMOL/L — SIGNIFICANT CHANGE UP (ref 96–108)
CHLORIDE SERPL-SCNC: 101 MMOL/L — SIGNIFICANT CHANGE UP (ref 96–108)
CO2 SERPL-SCNC: 27 MMOL/L — SIGNIFICANT CHANGE UP (ref 22–31)
CO2 SERPL-SCNC: 27 MMOL/L — SIGNIFICANT CHANGE UP (ref 22–31)
CREAT SERPL-MCNC: 0.97 MG/DL — SIGNIFICANT CHANGE UP (ref 0.5–1.3)
CREAT SERPL-MCNC: 0.97 MG/DL — SIGNIFICANT CHANGE UP (ref 0.5–1.3)
EGFR: 90 ML/MIN/1.73M2 — SIGNIFICANT CHANGE UP
EGFR: 90 ML/MIN/1.73M2 — SIGNIFICANT CHANGE UP
EOSINOPHIL # BLD AUTO: 0.23 K/UL — SIGNIFICANT CHANGE UP (ref 0–0.5)
EOSINOPHIL # BLD AUTO: 0.23 K/UL — SIGNIFICANT CHANGE UP (ref 0–0.5)
EOSINOPHIL NFR BLD AUTO: 3.1 % — SIGNIFICANT CHANGE UP (ref 0–6)
EOSINOPHIL NFR BLD AUTO: 3.1 % — SIGNIFICANT CHANGE UP (ref 0–6)
GLUCOSE SERPL-MCNC: 95 MG/DL — SIGNIFICANT CHANGE UP (ref 70–99)
GLUCOSE SERPL-MCNC: 95 MG/DL — SIGNIFICANT CHANGE UP (ref 70–99)
HCT VFR BLD CALC: 40.6 % — SIGNIFICANT CHANGE UP (ref 39–50)
HCT VFR BLD CALC: 40.6 % — SIGNIFICANT CHANGE UP (ref 39–50)
HGB BLD-MCNC: 14.2 G/DL — SIGNIFICANT CHANGE UP (ref 13–17)
HGB BLD-MCNC: 14.2 G/DL — SIGNIFICANT CHANGE UP (ref 13–17)
IGG FLD-MCNC: 1520 MG/DL — SIGNIFICANT CHANGE UP (ref 610–1660)
IGG FLD-MCNC: 1520 MG/DL — SIGNIFICANT CHANGE UP (ref 610–1660)
IGG1 SER-MCNC: 958 MG/DL — SIGNIFICANT CHANGE UP (ref 240–1118)
IGG1 SER-MCNC: 958 MG/DL — SIGNIFICANT CHANGE UP (ref 240–1118)
IGG2 SER-MCNC: 448 MG/DL — SIGNIFICANT CHANGE UP (ref 124–549)
IGG2 SER-MCNC: 448 MG/DL — SIGNIFICANT CHANGE UP (ref 124–549)
IGG3 SER-MCNC: 46.4 MG/DL — SIGNIFICANT CHANGE UP (ref 15–102)
IGG3 SER-MCNC: 46.4 MG/DL — SIGNIFICANT CHANGE UP (ref 15–102)
IGG4 SER-MCNC: 70 MG/DL — SIGNIFICANT CHANGE UP (ref 1–123)
IGG4 SER-MCNC: 70 MG/DL — SIGNIFICANT CHANGE UP (ref 1–123)
IMM GRANULOCYTES NFR BLD AUTO: 0.3 % — SIGNIFICANT CHANGE UP (ref 0–0.9)
IMM GRANULOCYTES NFR BLD AUTO: 0.3 % — SIGNIFICANT CHANGE UP (ref 0–0.9)
LYMPHOCYTES # BLD AUTO: 1.74 K/UL — SIGNIFICANT CHANGE UP (ref 1–3.3)
LYMPHOCYTES # BLD AUTO: 1.74 K/UL — SIGNIFICANT CHANGE UP (ref 1–3.3)
LYMPHOCYTES # BLD AUTO: 23.3 % — SIGNIFICANT CHANGE UP (ref 13–44)
LYMPHOCYTES # BLD AUTO: 23.3 % — SIGNIFICANT CHANGE UP (ref 13–44)
MAGNESIUM SERPL-MCNC: 1.9 MG/DL — SIGNIFICANT CHANGE UP (ref 1.6–2.6)
MAGNESIUM SERPL-MCNC: 1.9 MG/DL — SIGNIFICANT CHANGE UP (ref 1.6–2.6)
MCHC RBC-ENTMCNC: 32.6 PG — SIGNIFICANT CHANGE UP (ref 27–34)
MCHC RBC-ENTMCNC: 32.6 PG — SIGNIFICANT CHANGE UP (ref 27–34)
MCHC RBC-ENTMCNC: 35 GM/DL — SIGNIFICANT CHANGE UP (ref 32–36)
MCHC RBC-ENTMCNC: 35 GM/DL — SIGNIFICANT CHANGE UP (ref 32–36)
MCV RBC AUTO: 93.1 FL — SIGNIFICANT CHANGE UP (ref 80–100)
MCV RBC AUTO: 93.1 FL — SIGNIFICANT CHANGE UP (ref 80–100)
MONOCYTES # BLD AUTO: 0.7 K/UL — SIGNIFICANT CHANGE UP (ref 0–0.9)
MONOCYTES # BLD AUTO: 0.7 K/UL — SIGNIFICANT CHANGE UP (ref 0–0.9)
MONOCYTES NFR BLD AUTO: 9.4 % — SIGNIFICANT CHANGE UP (ref 2–14)
MONOCYTES NFR BLD AUTO: 9.4 % — SIGNIFICANT CHANGE UP (ref 2–14)
NEUTROPHILS # BLD AUTO: 4.72 K/UL — SIGNIFICANT CHANGE UP (ref 1.8–7.4)
NEUTROPHILS # BLD AUTO: 4.72 K/UL — SIGNIFICANT CHANGE UP (ref 1.8–7.4)
NEUTROPHILS NFR BLD AUTO: 63.2 % — SIGNIFICANT CHANGE UP (ref 43–77)
NEUTROPHILS NFR BLD AUTO: 63.2 % — SIGNIFICANT CHANGE UP (ref 43–77)
NRBC # BLD: 0 /100 WBCS — SIGNIFICANT CHANGE UP (ref 0–0)
NRBC # BLD: 0 /100 WBCS — SIGNIFICANT CHANGE UP (ref 0–0)
PHOSPHATE SERPL-MCNC: 3.2 MG/DL — SIGNIFICANT CHANGE UP (ref 2.5–4.5)
PHOSPHATE SERPL-MCNC: 3.2 MG/DL — SIGNIFICANT CHANGE UP (ref 2.5–4.5)
PLATELET # BLD AUTO: 322 K/UL — SIGNIFICANT CHANGE UP (ref 150–400)
PLATELET # BLD AUTO: 322 K/UL — SIGNIFICANT CHANGE UP (ref 150–400)
POTASSIUM SERPL-MCNC: 4 MMOL/L — SIGNIFICANT CHANGE UP (ref 3.5–5.3)
POTASSIUM SERPL-MCNC: 4 MMOL/L — SIGNIFICANT CHANGE UP (ref 3.5–5.3)
POTASSIUM SERPL-SCNC: 4 MMOL/L — SIGNIFICANT CHANGE UP (ref 3.5–5.3)
POTASSIUM SERPL-SCNC: 4 MMOL/L — SIGNIFICANT CHANGE UP (ref 3.5–5.3)
PROT SERPL-MCNC: 7.8 G/DL — SIGNIFICANT CHANGE UP (ref 6–8.3)
PROT SERPL-MCNC: 7.8 G/DL — SIGNIFICANT CHANGE UP (ref 6–8.3)
RBC # BLD: 4.36 M/UL — SIGNIFICANT CHANGE UP (ref 4.2–5.8)
RBC # BLD: 4.36 M/UL — SIGNIFICANT CHANGE UP (ref 4.2–5.8)
RBC # FLD: 11.6 % — SIGNIFICANT CHANGE UP (ref 10.3–14.5)
RBC # FLD: 11.6 % — SIGNIFICANT CHANGE UP (ref 10.3–14.5)
SODIUM SERPL-SCNC: 135 MMOL/L — SIGNIFICANT CHANGE UP (ref 135–145)
SODIUM SERPL-SCNC: 135 MMOL/L — SIGNIFICANT CHANGE UP (ref 135–145)
WBC # BLD: 7.46 K/UL — SIGNIFICANT CHANGE UP (ref 3.8–10.5)
WBC # BLD: 7.46 K/UL — SIGNIFICANT CHANGE UP (ref 3.8–10.5)
WBC # FLD AUTO: 7.46 K/UL — SIGNIFICANT CHANGE UP (ref 3.8–10.5)
WBC # FLD AUTO: 7.46 K/UL — SIGNIFICANT CHANGE UP (ref 3.8–10.5)

## 2023-11-01 PROCEDURE — 70551 MRI BRAIN STEM W/O DYE: CPT

## 2023-11-01 PROCEDURE — 82784 ASSAY IGA/IGD/IGG/IGM EACH: CPT

## 2023-11-01 PROCEDURE — 97168 OT RE-EVAL EST PLAN CARE: CPT

## 2023-11-01 PROCEDURE — 86036 ANCA SCREEN EACH ANTIBODY: CPT

## 2023-11-01 PROCEDURE — 36415 COLL VENOUS BLD VENIPUNCTURE: CPT

## 2023-11-01 PROCEDURE — 83735 ASSAY OF MAGNESIUM: CPT

## 2023-11-01 PROCEDURE — 96374 THER/PROPH/DIAG INJ IV PUSH: CPT

## 2023-11-01 PROCEDURE — C1894: CPT

## 2023-11-01 PROCEDURE — 86780 TREPONEMA PALLIDUM: CPT

## 2023-11-01 PROCEDURE — 86618 LYME DISEASE ANTIBODY: CPT

## 2023-11-01 PROCEDURE — 86160 COMPLEMENT ANTIGEN: CPT

## 2023-11-01 PROCEDURE — 93005 ELECTROCARDIOGRAM TRACING: CPT

## 2023-11-01 PROCEDURE — 81003 URINALYSIS AUTO W/O SCOPE: CPT

## 2023-11-01 PROCEDURE — 80053 COMPREHEN METABOLIC PANEL: CPT

## 2023-11-01 PROCEDURE — 96375 TX/PRO/DX INJ NEW DRUG ADDON: CPT

## 2023-11-01 PROCEDURE — 80048 BASIC METABOLIC PNL TOTAL CA: CPT

## 2023-11-01 PROCEDURE — 86850 RBC ANTIBODY SCREEN: CPT

## 2023-11-01 PROCEDURE — 97165 OT EVAL LOW COMPLEX 30 MIN: CPT

## 2023-11-01 PROCEDURE — 73560 X-RAY EXAM OF KNEE 1 OR 2: CPT

## 2023-11-01 PROCEDURE — 85660 RBC SICKLE CELL TEST: CPT

## 2023-11-01 PROCEDURE — 80307 DRUG TEST PRSMV CHEM ANLYZR: CPT

## 2023-11-01 PROCEDURE — 86235 NUCLEAR ANTIGEN ANTIBODY: CPT

## 2023-11-01 PROCEDURE — 97116 GAIT TRAINING THERAPY: CPT

## 2023-11-01 PROCEDURE — 86705 HEP B CORE ANTIBODY IGM: CPT

## 2023-11-01 PROCEDURE — 80074 ACUTE HEPATITIS PANEL: CPT

## 2023-11-01 PROCEDURE — 0042T: CPT

## 2023-11-01 PROCEDURE — 86200 CCP ANTIBODY: CPT

## 2023-11-01 PROCEDURE — 97535 SELF CARE MNGMENT TRAINING: CPT

## 2023-11-01 PROCEDURE — 99232 SBSQ HOSP IP/OBS MODERATE 35: CPT

## 2023-11-01 PROCEDURE — C1764: CPT

## 2023-11-01 PROCEDURE — 99285 EMERGENCY DEPT VISIT HI MDM: CPT

## 2023-11-01 PROCEDURE — 86665 EPSTEIN-BARR CAPSID VCA: CPT

## 2023-11-01 PROCEDURE — 82164 ANGIOTENSIN I ENZYME TEST: CPT

## 2023-11-01 PROCEDURE — 93306 TTE W/DOPPLER COMPLETE: CPT

## 2023-11-01 PROCEDURE — 84155 ASSAY OF PROTEIN SERUM: CPT

## 2023-11-01 PROCEDURE — 86663 EPSTEIN-BARR ANTIBODY: CPT

## 2023-11-01 PROCEDURE — 76705 ECHO EXAM OF ABDOMEN: CPT

## 2023-11-01 PROCEDURE — 85027 COMPLETE CBC AUTOMATED: CPT

## 2023-11-01 PROCEDURE — 97530 THERAPEUTIC ACTIVITIES: CPT

## 2023-11-01 PROCEDURE — 97161 PT EVAL LOW COMPLEX 20 MIN: CPT

## 2023-11-01 PROCEDURE — 86255 FLUORESCENT ANTIBODY SCREEN: CPT

## 2023-11-01 PROCEDURE — 83605 ASSAY OF LACTIC ACID: CPT

## 2023-11-01 PROCEDURE — 84480 ASSAY TRIIODOTHYRONINE (T3): CPT

## 2023-11-01 PROCEDURE — 86140 C-REACTIVE PROTEIN: CPT

## 2023-11-01 PROCEDURE — 83516 IMMUNOASSAY NONANTIBODY: CPT

## 2023-11-01 PROCEDURE — 70496 CT ANGIOGRAPHY HEAD: CPT

## 2023-11-01 PROCEDURE — 86704 HEP B CORE ANTIBODY TOTAL: CPT

## 2023-11-01 PROCEDURE — 84100 ASSAY OF PHOSPHORUS: CPT

## 2023-11-01 PROCEDURE — 97164 PT RE-EVAL EST PLAN CARE: CPT

## 2023-11-01 PROCEDURE — 84443 ASSAY THYROID STIM HORMONE: CPT

## 2023-11-01 PROCEDURE — 87340 HEPATITIS B SURFACE AG IA: CPT

## 2023-11-01 PROCEDURE — 85652 RBC SED RATE AUTOMATED: CPT

## 2023-11-01 PROCEDURE — 85025 COMPLETE CBC W/AUTO DIFF WBC: CPT

## 2023-11-01 PROCEDURE — 86787 VARICELLA-ZOSTER ANTIBODY: CPT

## 2023-11-01 PROCEDURE — C1887: CPT

## 2023-11-01 PROCEDURE — 86803 HEPATITIS C AB TEST: CPT

## 2023-11-01 PROCEDURE — 86664 EPSTEIN-BARR NUCLEAR ANTIGEN: CPT

## 2023-11-01 PROCEDURE — 96376 TX/PRO/DX INJ SAME DRUG ADON: CPT

## 2023-11-01 PROCEDURE — 86334 IMMUNOFIX E-PHORESIS SERUM: CPT

## 2023-11-01 PROCEDURE — 86709 HEPATITIS A IGM ANTIBODY: CPT

## 2023-11-01 PROCEDURE — 83036 HEMOGLOBIN GLYCOSYLATED A1C: CPT

## 2023-11-01 PROCEDURE — 86008 ALLG SPEC IGE RECOMB EA: CPT

## 2023-11-01 PROCEDURE — 93970 EXTREMITY STUDY: CPT

## 2023-11-01 PROCEDURE — 86706 HEP B SURFACE ANTIBODY: CPT

## 2023-11-01 PROCEDURE — 86900 BLOOD TYPING SEROLOGIC ABO: CPT

## 2023-11-01 PROCEDURE — 70450 CT HEAD/BRAIN W/O DYE: CPT

## 2023-11-01 PROCEDURE — 84484 ASSAY OF TROPONIN QUANT: CPT

## 2023-11-01 PROCEDURE — 82962 GLUCOSE BLOOD TEST: CPT

## 2023-11-01 PROCEDURE — 86431 RHEUMATOID FACTOR QUANT: CPT

## 2023-11-01 PROCEDURE — C1760: CPT

## 2023-11-01 PROCEDURE — 93312 ECHO TRANSESOPHAGEAL: CPT

## 2023-11-01 PROCEDURE — 84165 PROTEIN E-PHORESIS SERUM: CPT

## 2023-11-01 PROCEDURE — 86901 BLOOD TYPING SEROLOGIC RH(D): CPT

## 2023-11-01 PROCEDURE — 82595 ASSAY OF CRYOGLOBULIN: CPT

## 2023-11-01 PROCEDURE — 86038 ANTINUCLEAR ANTIBODIES: CPT

## 2023-11-01 PROCEDURE — C1769: CPT

## 2023-11-01 PROCEDURE — 84439 ASSAY OF FREE THYROXINE: CPT

## 2023-11-01 PROCEDURE — 70544 MR ANGIOGRAPHY HEAD W/O DYE: CPT

## 2023-11-01 PROCEDURE — 82787 IGG 1 2 3 OR 4 EACH: CPT

## 2023-11-01 PROCEDURE — 70498 CT ANGIOGRAPHY NECK: CPT | Mod: MA

## 2023-11-01 PROCEDURE — 80061 LIPID PANEL: CPT

## 2023-11-01 PROCEDURE — 87389 HIV-1 AG W/HIV-1&-2 AB AG IA: CPT

## 2023-11-01 RX ORDER — LISINOPRIL 2.5 MG/1
1 TABLET ORAL
Qty: 60 | Refills: 0
Start: 2023-11-01 | End: 2023-12-30

## 2023-11-01 RX ORDER — NIFEDIPINE 30 MG
1 TABLET, EXTENDED RELEASE 24 HR ORAL
Qty: 60 | Refills: 0
Start: 2023-11-01 | End: 2023-12-30

## 2023-11-01 RX ORDER — ASPIRIN/CALCIUM CARB/MAGNESIUM 324 MG
1 TABLET ORAL
Qty: 90 | Refills: 0
Start: 2023-11-01 | End: 2024-01-29

## 2023-11-01 RX ORDER — ATORVASTATIN CALCIUM 80 MG/1
1 TABLET, FILM COATED ORAL
Qty: 90 | Refills: 0
Start: 2023-11-01 | End: 2024-01-29

## 2023-11-01 RX ORDER — CLOPIDOGREL BISULFATE 75 MG/1
1 TABLET, FILM COATED ORAL
Qty: 90 | Refills: 0
Start: 2023-11-01 | End: 2024-01-29

## 2023-11-01 RX ORDER — MECLIZINE HCL 12.5 MG
1 TABLET ORAL
Qty: 90 | Refills: 0
Start: 2023-11-01 | End: 2023-11-30

## 2023-11-01 RX ADMIN — Medication 81 MILLIGRAM(S): at 12:18

## 2023-11-01 RX ADMIN — Medication 12.5 MILLIGRAM(S): at 14:21

## 2023-11-01 RX ADMIN — LIDOCAINE 1 PATCH: 4 CREAM TOPICAL at 13:37

## 2023-11-01 RX ADMIN — ENOXAPARIN SODIUM 40 MILLIGRAM(S): 100 INJECTION SUBCUTANEOUS at 14:20

## 2023-11-01 RX ADMIN — LISINOPRIL 20 MILLIGRAM(S): 2.5 TABLET ORAL at 06:00

## 2023-11-01 RX ADMIN — Medication 12.5 MILLIGRAM(S): at 06:00

## 2023-11-01 RX ADMIN — CLOPIDOGREL BISULFATE 75 MILLIGRAM(S): 75 TABLET, FILM COATED ORAL at 12:18

## 2023-11-01 RX ADMIN — LIDOCAINE 1 PATCH: 4 CREAM TOPICAL at 06:33

## 2023-11-01 RX ADMIN — LIDOCAINE 1 PATCH: 4 CREAM TOPICAL at 00:52

## 2023-11-01 RX ADMIN — Medication 30 MILLIGRAM(S): at 06:00

## 2023-11-01 NOTE — DISCHARGE NOTE NURSING/CASE MANAGEMENT/SOCIAL WORK - NSDCPEPTSTROKESIGNS_GEN_ALL_CORE
Pt taken to [facility] via [place] at this time. Belongings sent with patient. Pt breathing easy and nonlabored. No further questions at this time.    Sudden numbness or weakness of the face, arm, or leg, especially on one side of the body. Confusion, trouble speaking or understanding. Trouble seeing in one or both eyes. Trouble walking, dizziness, loss of balance or coordination. Severe headache.

## 2023-11-01 NOTE — PROGRESS NOTE ADULT - SUBJECTIVE AND OBJECTIVE BOX
Patient is a 57y old  Male who presents with a chief complaint of SOB (01 Nov 2023 09:32)      INTERVAL HPI/OVERNIGHT EVENTS:    Pt. seen and examined earlier today  Pt. feels well, reports L knee pain, swelling, and warmth have resolved  No new complaints    Review of Systems: 12 point review of systems otherwise negative    MEDICATIONS  (STANDING):  aspirin enteric coated 81 milliGRAM(s) Oral daily  atorvastatin 40 milliGRAM(s) Oral at bedtime  clopidogrel Tablet 75 milliGRAM(s) Oral daily  enoxaparin Injectable 40 milliGRAM(s) SubCutaneous every 24 hours  influenza   Vaccine 0.5 milliLiter(s) IntraMuscular once  lidocaine   4% Patch 1 Patch Transdermal every 24 hours  lisinopril 20 milliGRAM(s) Oral daily  meclizine 12.5 milliGRAM(s) Oral every 8 hours  NIFEdipine XL 30 milliGRAM(s) Oral daily    MEDICATIONS  (PRN):  traMADol 25 milliGRAM(s) Oral every 6 hours PRN Moderate Pain (4 - 6)      Allergies    No Known Allergies    Intolerances          Vital Signs Last 24 Hrs  T(C): 36.7 (01 Nov 2023 12:19), Max: 36.8 (01 Nov 2023 05:04)  T(F): 98 (01 Nov 2023 12:19), Max: 98.2 (01 Nov 2023 05:04)  HR: 68 (01 Nov 2023 12:19) (65 - 83)  BP: 141/93 (01 Nov 2023 12:19) (140/97 - 152/105)  BP(mean): 121 (01 Nov 2023 05:04) (111 - 121)  RR: 16 (01 Nov 2023 12:19) (16 - 18)  SpO2: 95% (01 Nov 2023 12:19) (95% - 96%)    Parameters below as of 01 Nov 2023 12:19  Patient On (Oxygen Delivery Method): room air      CAPILLARY BLOOD GLUCOSE            Physical Exam:  (earlier today)  Daily     Daily   General:  comfortable-appearing in NAD  HEENT:  MMM  Extremities:  L knee unremarkable  Skin:  WWP  Neuro:  AAOx3    LABS:                        14.2   7.46  )-----------( 322      ( 01 Nov 2023 05:30 )             40.6     11-01    135  |  101  |  17  ----------------------------<  95  4.0   |  27  |  0.97    Ca    9.5      01 Nov 2023 05:30  Phos  3.2     11-01  Mg     1.9     11-01    TPro  7.8  /  Alb  3.9  /  TBili  0.5  /  DBili  x   /  AST  89<H>  /  ALT  269<H>  /  AlkPhos  68  11-01      Urinalysis Basic - ( 01 Nov 2023 05:30 )    Color: x / Appearance: x / SG: x / pH: x  Gluc: 95 mg/dL / Ketone: x  / Bili: x / Urobili: x   Blood: x / Protein: x / Nitrite: x   Leuk Esterase: x / RBC: x / WBC x   Sq Epi: x / Non Sq Epi: x / Bacteria: x

## 2023-11-01 NOTE — PROGRESS NOTE ADULT - SUBJECTIVE AND OBJECTIVE BOX
Pt seen and examined at bedside, resting comfortably with no acute complaints. Pt tolerating diet at this time. Denies abdominal pain, nausea, vomiting, constipation, diarrhea, chest pain, bloating, hematemesis, hematochezia, melena.     Allergies    No Known Allergies    Intolerances      MEDICATIONS:  MEDICATIONS  (STANDING):  aspirin enteric coated 81 milliGRAM(s) Oral daily  atorvastatin 40 milliGRAM(s) Oral at bedtime  clopidogrel Tablet 75 milliGRAM(s) Oral daily  enoxaparin Injectable 40 milliGRAM(s) SubCutaneous every 24 hours  influenza   Vaccine 0.5 milliLiter(s) IntraMuscular once  lidocaine   4% Patch 1 Patch Transdermal every 24 hours  lisinopril 20 milliGRAM(s) Oral daily  meclizine 12.5 milliGRAM(s) Oral every 8 hours  NIFEdipine XL 30 milliGRAM(s) Oral daily    MEDICATIONS  (PRN):  traMADol 25 milliGRAM(s) Oral every 6 hours PRN Moderate Pain (4 - 6)    Vital Signs Last 24 Hrs  T(C): 36.8 (01 Nov 2023 05:04), Max: 36.8 (01 Nov 2023 05:04)  T(F): 98.2 (01 Nov 2023 05:04), Max: 98.2 (01 Nov 2023 05:04)  HR: 70 (01 Nov 2023 07:00) (65 - 93)  BP: 143/94 (01 Nov 2023 07:00) (129/73 - 152/105)  BP(mean): 121 (01 Nov 2023 05:04) (111 - 121)  RR: 18 (01 Nov 2023 05:04) (18 - 18)  SpO2: 96% (01 Nov 2023 05:04) (95% - 96%)    Parameters below as of 01 Nov 2023 05:04  Patient On (Oxygen Delivery Method): room air        PHYSICAL EXAM:    General: Well developed; well nourished; in no acute distress  HEENT: MMM, conjunctiva and sclera clear  Neck: Supple  Respiratory: No respiratory distress. No intercostal retractions  Gastrointestinal: Soft non-tender non-distended; Normal bowel sounds; No hepatosplenomegaly. No rebound or guarding  Extremities: No clubbing, cyanosis, or edema  Skin: Warm and dry. No obvious rash, spider angiomas, telangiectasias, palmar erythema.  Neuro: A&O x 3. No asterixis.    LABS:                        14.2   7.46  )-----------( 322      ( 01 Nov 2023 05:30 )             40.6     10-31    140  |  104  |  18  ----------------------------<  100<H>  4.3   |  27  |  1.02    Ca    9.2      31 Oct 2023 05:30  Phos  3.6     10-31  Mg     2.1     10-31    TPro  7.6  /  Alb  3.8  /  TBili  0.5  /  DBili  x   /  AST  266<H>  /  ALT  424<H>  /  AlkPhos  86  10-31          Urinalysis Basic - ( 31 Oct 2023 05:30 )    Color: x / Appearance: x / SG: x / pH: x  Gluc: 100 mg/dL / Ketone: x  / Bili: x / Urobili: x   Blood: x / Protein: x / Nitrite: x   Leuk Esterase: x / RBC: x / WBC x   Sq Epi: x / Non Sq Epi: x / Bacteria: x                RADIOLOGY & ADDITIONAL STUDIES:

## 2023-11-01 NOTE — DISCHARGE NOTE NURSING/CASE MANAGEMENT/SOCIAL WORK - NSDCFUADDAPPT_GEN_ALL_CORE_FT
Patient should see pulmonology outpatient for follow up for pulmonary AVM. Call [phone] to make appt    You will receive a call from the neurology office to set up an outpatient follow up appointment. If you do not receive a call within one week of discharge, please call [phone] and ask to make an appointment with a provider from the stroke team. Continue aspirin and plavix for 3 months then aspirin alone.     Follow up with your PCP within 2 weeks of discharge from rehab

## 2023-11-01 NOTE — DISCHARGE NOTE NURSING/CASE MANAGEMENT/SOCIAL WORK - PATIENT PORTAL LINK FT
You can access the FollowMyHealth Patient Portal offered by [facility] by registering at the following website: [email or web address]. By joining [facility]’s FollowMyHealth portal, you will also be able to view your health information using other applications (apps) compatible with our system.

## 2023-11-01 NOTE — PROGRESS NOTE ADULT - PROBLEM SELECTOR PLAN 4
BP goal: gradual normotension, per Neuro.    Plan:  -Lisinopril 20mg qhs  -C/w nifedipine 30mg daily  -Continue DASH diet BP goal: gradual normotension, per Neuro.    Plan:   • C/w Lisinopril 20mg qhs   • C/w nifedipine 30mg daily   • Continue DASH diet

## 2023-11-01 NOTE — PROGRESS NOTE ADULT - ASSESSMENT
57 year old man w/ PMH of HTN (noncompliant) presented w/ intermittent roomspinning dizziness, admitted to neurology service for further workup. 57 year old man w/ PMH of HTN (noncompliant) presented w/ intermittent room-spinning dizziness, admitted to neurology service for further workup. C/C/B transaminitis, now downtrending.     Dispo: Pending AR approval  Pt is medically optimized for discharge

## 2023-11-01 NOTE — PROGRESS NOTE ADULT - SUBJECTIVE AND OBJECTIVE BOX
Medicine Progress Note  INCOMPLETE NOTE    INTERVAL EVENTS:   No acute events overnight.    SUBJECTIVE:  Patient seen and examined at bedside. Condition largely unchanged from yesterday. No acute complaints at this time.    ROS:   Negative unless otherwise stated above.    PHYSICAL EXAM:   Noted in "Physical Exam" section below.    VITAL SIGNS:  Vital Signs Last 24 Hrs  T(C): 36.8 (01 Nov 2023 05:04), Max: 36.8 (01 Nov 2023 05:04)  T(F): 98.2 (01 Nov 2023 05:04), Max: 98.2 (01 Nov 2023 05:04)  HR: 65 (01 Nov 2023 05:04) (65 - 93)  BP: 152/105 (01 Nov 2023 05:04) (129/73 - 152/105)  BP(mean): 121 (01 Nov 2023 05:04) (111 - 121)  RR: 18 (01 Nov 2023 05:04) (18 - 18)  SpO2: 96% (01 Nov 2023 05:04) (95% - 96%)    Parameters below as of 01 Nov 2023 05:04  Patient On (Oxygen Delivery Method): room air      T(C): 36.8 (11-01-23 @ 05:04), Max: 36.8 (11-01-23 @ 05:04)  HR: 65 (11-01-23 @ 05:04) (65 - 93)  BP: 152/105 (11-01-23 @ 05:04) (129/73 - 152/105)  RR: 18 (11-01-23 @ 05:04) (18 - 18)  SpO2: 96% (11-01-23 @ 05:04) (95% - 96%)        MEDICATIONS:  MEDICATIONS  (STANDING):  aspirin enteric coated 81 milliGRAM(s) Oral daily  atorvastatin 40 milliGRAM(s) Oral at bedtime  clopidogrel Tablet 75 milliGRAM(s) Oral daily  enoxaparin Injectable 40 milliGRAM(s) SubCutaneous every 24 hours  influenza   Vaccine 0.5 milliLiter(s) IntraMuscular once  lidocaine   4% Patch 1 Patch Transdermal every 24 hours  lisinopril 20 milliGRAM(s) Oral daily  meclizine 12.5 milliGRAM(s) Oral every 8 hours  NIFEdipine XL 30 milliGRAM(s) Oral daily    MEDICATIONS  (PRN):  traMADol 25 milliGRAM(s) Oral every 6 hours PRN Moderate Pain (4 - 6)      ALLERGIES:  Allergies    No Known Allergies    Intolerances        LABS:                        14.2   5.65  )-----------( 313      ( 31 Oct 2023 05:30 )             41.4     10-31    140  |  104  |  18  ----------------------------<  100<H>  4.3   |  27  |  1.02    Ca    9.2      31 Oct 2023 05:30  Phos  3.6     10-31  Mg     2.1     10-31    TPro  7.6  /  Alb  3.8  /  TBili  0.5  /  DBili  x   /  AST  266<H>  /  ALT  424<H>  /  AlkPhos  86  10-31      Urinalysis Basic - ( 31 Oct 2023 05:30 )    Color: x / Appearance: x / SG: x / pH: x  Gluc: 100 mg/dL / Ketone: x  / Bili: x / Urobili: x   Blood: x / Protein: x / Nitrite: x   Leuk Esterase: x / RBC: x / WBC x   Sq Epi: x / Non Sq Epi: x / Bacteria: x      CAPILLARY BLOOD GLUCOSE          RADIOLOGY & ADDITIONAL TESTS: Reviewed. Medicine Progress Note    INTERVAL EVENTS:   No acute events overnight.    SUBJECTIVE:  Patient seen and examined at bedside. Condition largely unchanged from yesterday. No acute complaints at this time.    ROS:   Negative unless otherwise stated above.    PHYSICAL EXAM:   General: NAD  HEENT: NC/AT; PERRL, anicteric sclera; MMM  Neck: supple w/o palpable nodularity  Cardiovascular: +S1/S2; RRR  Respiratory: CTA B/L; no W/R/R  Gastrointestinal: soft, NT/ND; +BSx4  Extremities: WWP; no edema, clubbing or cyanosis  Vascular: 2+ radial, DP/PT pulses B/L  Neurological: AAOx3; no focal deficits    VITAL SIGNS:  Vital Signs Last 24 Hrs  T(C): 36.8 (01 Nov 2023 05:04), Max: 36.8 (01 Nov 2023 05:04)  T(F): 98.2 (01 Nov 2023 05:04), Max: 98.2 (01 Nov 2023 05:04)  HR: 65 (01 Nov 2023 05:04) (65 - 93)  BP: 152/105 (01 Nov 2023 05:04) (129/73 - 152/105)  BP(mean): 121 (01 Nov 2023 05:04) (111 - 121)  RR: 18 (01 Nov 2023 05:04) (18 - 18)  SpO2: 96% (01 Nov 2023 05:04) (95% - 96%)    Parameters below as of 01 Nov 2023 05:04  Patient On (Oxygen Delivery Method): room air      T(C): 36.8 (11-01-23 @ 05:04), Max: 36.8 (11-01-23 @ 05:04)  HR: 65 (11-01-23 @ 05:04) (65 - 93)  BP: 152/105 (11-01-23 @ 05:04) (129/73 - 152/105)  RR: 18 (11-01-23 @ 05:04) (18 - 18)  SpO2: 96% (11-01-23 @ 05:04) (95% - 96%)        MEDICATIONS:  MEDICATIONS  (STANDING):  aspirin enteric coated 81 milliGRAM(s) Oral daily  atorvastatin 40 milliGRAM(s) Oral at bedtime  clopidogrel Tablet 75 milliGRAM(s) Oral daily  enoxaparin Injectable 40 milliGRAM(s) SubCutaneous every 24 hours  influenza   Vaccine 0.5 milliLiter(s) IntraMuscular once  lidocaine   4% Patch 1 Patch Transdermal every 24 hours  lisinopril 20 milliGRAM(s) Oral daily  meclizine 12.5 milliGRAM(s) Oral every 8 hours  NIFEdipine XL 30 milliGRAM(s) Oral daily    MEDICATIONS  (PRN):  traMADol 25 milliGRAM(s) Oral every 6 hours PRN Moderate Pain (4 - 6)      ALLERGIES:  Allergies    No Known Allergies    Intolerances        LABS:                        14.2   5.65  )-----------( 313      ( 31 Oct 2023 05:30 )             41.4     10-31    140  |  104  |  18  ----------------------------<  100<H>  4.3   |  27  |  1.02    Ca    9.2      31 Oct 2023 05:30  Phos  3.6     10-31  Mg     2.1     10-31    TPro  7.6  /  Alb  3.8  /  TBili  0.5  /  DBili  x   /  AST  266<H>  /  ALT  424<H>  /  AlkPhos  86  10-31      Urinalysis Basic - ( 31 Oct 2023 05:30 )    Color: x / Appearance: x / SG: x / pH: x  Gluc: 100 mg/dL / Ketone: x  / Bili: x / Urobili: x   Blood: x / Protein: x / Nitrite: x   Leuk Esterase: x / RBC: x / WBC x   Sq Epi: x / Non Sq Epi: x / Bacteria: x      CAPILLARY BLOOD GLUCOSE          RADIOLOGY & ADDITIONAL TESTS: Reviewed.

## 2023-11-01 NOTE — DISCHARGE NOTE NURSING/CASE MANAGEMENT/SOCIAL WORK - NSDCPEFALRISK_GEN_ALL_CORE
For information on Fall & Injury Prevention, visit: [email or web address] OR  [email or web address] OR  https://www.cdc.gov/steadi/patient.html

## 2023-11-01 NOTE — CHART NOTE - NSCHARTNOTEFT_GEN_A_CORE
Admitting Diagnosis:   Patient is a 57y old  Male who presents with a chief complaint of DIZZINESS    PAST MEDICAL & SURGICAL HISTORY:  HTN (hypertension)    Current Nutrition Order:  DASH/TLC    PO Intake: Good (%) [   ]  Fair (50-75%) [ x ] Poor (<25%) [   ]    GI Issues:   RN denies pt with nausea/vomiting/diarrhea/constipation  No abdominal distension/discomfort noted     Pain:  No pain reported     Skin Integrity:  No pressure injuries or edema documented, Jefferson score 19    Labs:   10-27    138  |  105  |  28<H>  ----------------------------<  112<H>  4.0   |  19<L>  |  1.19    Ca    9.5      27 Oct 2023 08:25  Phos  3.9     10-27  Mg     2.2     10-27    Medications:  MEDICATIONS  (STANDING):  aspirin enteric coated 81 milliGRAM(s) Oral daily  atorvastatin 80 milliGRAM(s) Oral at bedtime  clopidogrel Tablet 75 milliGRAM(s) Oral daily  enoxaparin Injectable 40 milliGRAM(s) SubCutaneous every 24 hours  influenza   Vaccine 0.5 milliLiter(s) IntraMuscular once  labetalol 100 milliGRAM(s) Oral every 12 hours  lidocaine   4% Patch 1 Patch Transdermal every 24 hours  lisinopril 20 milliGRAM(s) Oral daily  meclizine 12.5 milliGRAM(s) Oral every 8 hours    MEDICATIONS  (PRN):  traMADol 25 milliGRAM(s) Oral every 6 hours PRN Moderate Pain (4 - 6)    Anthropometrics:  Height: 6'0"  Weight: 200lb/90.7kg (10/24, stated)  BMI: 27.1  IBW: 178lb/80.7kg     112% IBW    Weight Change:   10/25: 131lb/59.2kg (standing)  ?Accuracy wt history of stated vs. standing wts, recommend nursing to obtain new current wt. RD to monitor as able.     Nutrition Focused Physical Exam:  Unable to complete 10/27. No overt signs of wasting observed.     Estimated energy needs:   Calories: 25-30kcal/k-2421kcal/d  Protein: 1-1.2g/k-97g/d  Fluid: 25-30mL/k-2421mL/d  Estimated needs based on IBW as dosing wt 200lb/90.7kg >100% IBW (112%) in ICU setting. Needs adjusted for age, BMI, and clinical status.    Subjective:   57M with PMHx of HTN (noncompliant with meds) originally presented to [facility] ED c/o room spinning dizziness x 1 week worse with head movement. CTH with no acute intracranial injury. Microvascular disease. Chronic lacunar infarcts. CTA head and neck concerning for vasculitis. Admitted and managed for hypertensive emergency (SBPs 230s), on Cardene drip. Patient with change in neuro exam when SBP to 130s, and improved with augmentation. Stroke consulted, NIHSS 0 throughout. CT imaging unchanged. Admitted to MICU for stroke workup. S/p cerebral angio 10/24 with multifocal ICAD, no vasculitis. Likely dizziness is due to BPPV, improving with meclizine. TTE with bubble - EF 70-75%. ILR placed 10/26. Ortho consulted for L knee joint effusion s/p trauma. Recommend pain control. Stable for step down to regional. Pending AR.    Pt seen on 7WO for follow-up. Labs and medication orders reviewed. On DASH/TLC diet. Electrolytes WNL. Pt asleep and unable to rouse on multiple attempts at assessment. Observed ~50% completion lunch today 10/27. RN reports no GI concerns at this time. See nutrition recommendations. RD to remain available.     Previous Nutrition Diagnosis:  Inadequate oral intake related to pt condition as evidenced by reported variable intake of meals in-house.     Active [ x ]  Resolved [   ]    Goal:  Consistently meet >75% nutrient needs.     Recommendations:  1. Recommend Low Sodium diet to promote intake.   >>Encourage & monitor PO intake. [place] dietary preferences as able.   2. Monitor GI tolerance, weight trends, labs, & skin integrity.  3. Defer bowel and pain regimens to team.   4. RD to remain available for diet education/intervention prn.     Education:   Deferred.     Risk Level: High [ x ] Moderate [   ] Low [   ]
No further NSGY recommendations or interventions at this time, will sign off.     Pleas re-consult for further recommendations by calling [phone].
Patient can tolerate 3 hours of PT/OT daily in acute rehab
Admitting Diagnosis:   Patient is a 57y old  Male who presents with a chief complaint of SOB (2023 09:32)    PAST MEDICAL & SURGICAL HISTORY:  HTN (hypertension)    Current Nutrition Order:  DASH/TLC    PO Intake: Good (%) [ x ]  Fair (50-75%) [   ] Poor (<25%) [   ]    GI Issues:   Pt denies nausea/vomiting/diarrhea/constipation   No abdominal distension/discomfort noted     Pain:  No pain reported     Skin Integrity:  No pressure injuries or edema documented, Jefferson score 21    Labs:       135  |  101  |  17  ----------------------------<  95  4.0   |  27  |  0.97    Ca    9.5      2023 05:30  Phos  3.2       Mg     1.9         TPro  7.8  /  Alb  3.9  /  TBili  0.5  /  DBili  x   /  AST  89<H>  /  ALT  269<H>  /  AlkPhos  68      Medications:  MEDICATIONS  (STANDING):  aspirin enteric coated 81 milliGRAM(s) Oral daily  atorvastatin 40 milliGRAM(s) Oral at bedtime  clopidogrel Tablet 75 milliGRAM(s) Oral daily  enoxaparin Injectable 40 milliGRAM(s) SubCutaneous every 24 hours  influenza   Vaccine 0.5 milliLiter(s) IntraMuscular once  lidocaine   4% Patch 1 Patch Transdermal every 24 hours  lisinopril 20 milliGRAM(s) Oral daily  meclizine 12.5 milliGRAM(s) Oral every 8 hours  NIFEdipine XL 30 milliGRAM(s) Oral daily    MEDICATIONS  (PRN):  traMADol 25 milliGRAM(s) Oral every 6 hours PRN Moderate Pain (4 - 6)    Anthropometrics:  Height: 6'0"  Weight: 200lb/90.7kg (10/24, stated)  BMI: 27.1  IBW: 178lb/80.7kg     112% IBW    Weight Change:   10/25: 131lb/59.2kg (standing)  RD obtained bedscale wt : 217lb/98.8kg  ?Accuracy wt 10/25. RD obtained wt 10/11 consistent with pt report and RD observation. Recommend nursing to obtain new current wt. RD to monitor as able.     Nutrition Focused Physical Exam:  No overt signs of wasting observed.     Estimated energy needs:   Calories: 25-30kcal/k-2421kcal/d  Protein: 1-1.2g/k-97g/d  Fluid: 25-30mL/k-2421mL/d  Estimated needs based on IBW as dosing wt 200lb/90.7kg >100% IBW (112%) in ICU setting. Needs adjusted for age, BMI, and clinical status.    Subjective:   57 year old man w/ PMH of HTN (noncompliant) presented w/ intermittent room-spinning dizziness, admitted to neurology service for further workup. C/C/B transaminitis, now downtrending.     Pt seen on 7WO for follow-up. Labs and medication orders reviewed. Electrolytes WNL, AST/ALT 89/269 <H>. On DASH/TLC diet. Pt reports good appetite and reflective intake. Denies GI concerns and reports no difficulty chewing/swallowing. Reports UBW range ~200-230lb, denies recent wt changes. See nutrition recommendations. RD to remain available.     Previous Nutrition Diagnosis:  Inadequate oral intake related to pt condition as evidenced by reported variable intake of meals in-house.     Active [ x ]  Resolved [   ]    Goal:  Consistently meet >75% nutrient needs.     Recommendations:  1. Continue DASH/TLC diet.   2. Monitor GI tolerance, weight trends, labs, & skin integrity.  3. Defer bowel and pain regimens to team.   4. RD to remain available for diet education/intervention prn.     Education:   Encouraged continued adequate intake to promote recovery. Pt aware RD remains available for additional questions/concerns.     Risk Level: High [   ] Moderate [ x ] Low [   ]

## 2023-11-01 NOTE — PROGRESS NOTE ADULT - PROBLEM SELECTOR PLAN 1
MRI reviewed, shows "punctate foci of acute ischemia within the right periatrial white matter and left centrum semiovale ovale;" also found to have ICAD on angiogram.  s/p ILR    Plan:  -Appreciate neurology recommendations  -Per PT/OT-AR  -c/w ASA 81mg + Plavix 75mg for three months followed by ASA 81mg indefinitely per EPI
MRI reviewed, shows "punctate foci of acute ischemia within the right periatrial white matter and left centrum semiovale ovale;" also found to have ICAD on angiogram; cont. work-up and mgmt per Neuro; PT/OT; ILR placed 10/26; on DAPT + statin
MRI reviewed, shows "punctate foci of acute ischemia within the right periatrial white matter and left centrum semiovale ovale;" also found to have ICAD on angiogram.  s/p ILR    Plan:  -Appreciate neurology recommendations  -Per PT/OT-AR  -c/w ASA 81mg + Plavix 75mg for three months followed by ASA 81mg indefinitely per EPI
MRI reviewed, shows "punctate foci of acute ischemia within the right periatrial white matter and left centrum semiovale ovale;" also found to have ICAD on angiogram; cont. work-up and mgmt per Neuro and Rheumatology; PT/OT; ILR placed 10/26; on DAPT + statin
MRI reviewed, shows "punctate foci of acute ischemia within the right periatrial white matter and left centrum semiovale ovale;" also found to have ICAD on angiogram.    Plan:  -Appreciate neurology recommendations  -Per PT/OT, home therapy  -Continue DAPT, statin
MRI reviewed, shows "punctate foci of acute ischemia within the right periatrial white matter and left centrum semiovale ovale;" also found to have ICAD on angiogram; cont. work-up and mgmt per Neuro and Rheumatology; PT/OT; ILR placed today; on DAPT + statin
MRI reviewed, shows "punctate foci of acute ischemia within the right periatrial white matter and left centrum semiovale ovale;" also found to have ICAD on angiogram.  - S/p ILR placement    Plan:  -Appreciate neurology recommendations  -Per PT/OT-AR  -c/w ASA 81mg + Plavix 75mg for three months followed by ASA 81mg indefinitely per EPI

## 2023-11-01 NOTE — PROGRESS NOTE ADULT - TIME BILLING
direct patient encounter  reviewed labs and images  d/w Stroke ACP
direct patient encounter  reviewed labs and images  d/w Stroke ACP
direct patient encounter  reviewed labs and images  d/w Stroke ACP, Medicine residents
review of objective data, interview of patient, and discussion of assessment plan with patient/family/multidisciplinary team. I agree with ACP, Fellow documentation except where indicated above.
review of objective data, interview of patient, and discussion of assessment plan with patient/family/multidisciplinary team. I agree with ACP, Fellow documentation except where indicated above.

## 2023-11-01 NOTE — PROGRESS NOTE ADULT - NSPROGADDITIONALINFOA_GEN_ALL_CORE
DVT ppx: LMWH  Dispo: acute rehab
I have personally reviewed the above clinical note and all of its components and:  [ ] agree with the above assessment and plan without modifications.  [x] agree with the above with modifications made to the assessment and/or plan of care.  [ ] disagree with the above with significant modifications as listed below:
DVT ppx: LMWH  Dispo: home
DVT ppx: LMWH  Dispo: acute rehab

## 2023-11-01 NOTE — PROGRESS NOTE ADULT - PROBLEM SELECTOR PLAN 6
F: None  E: Replete PRN  N: DASH/TLC  DVT: Lovenox 40mg qd  GI: None  Code Status: Full
HbA1c 5.7%; monitor blood glucose, trial of lifestyle modifications, repeat HbA1c in a few months.    Plan:  -NTD
F: None  E: Replete PRN  N: DASH/TLC  DVT: Lovenox 40mg qd  GI: None  Code Status: Full
F: None  E: Replete PRN  N: DASH/TLC  DVT: Lovenox 40mg qd  GI: None  Code Status: Full
unclear etiology, but resolving; possibly related to hydralazine, Lipitor, and/or EtOH use; liver appears normal on U/S; viral hepatitis panel negative; appreciate GI recs; Lipitor dose decreased to 40mg; no need for further inpatient work-up, but Pt. should have LFTs trended as outpatient, either w/ PCP or GI, while on Lipitor; GI counseled Pt. on EtOH cessation

## 2023-11-01 NOTE — PROGRESS NOTE ADULT - PROBLEM SELECTOR PROBLEM 3
Essential hypertension
Acute pain of left knee

## 2023-11-01 NOTE — PROGRESS NOTE ADULT - PROBLEM SELECTOR PLAN 3
Pt. reports h/o trauma. X-ray reviewed, shows "medial knee joint mild to moderate narrowing, joint effusion, no acute fracture or dislocation." Symptoms improving; appreciate ortho recs    Plan:  -Continue RICE therapy  -PT Pt. reports h/o trauma. X-ray reviewed, shows "medial knee joint mild to moderate narrowing, joint effusion, no acute fracture or dislocation." Symptoms improving; appreciate ortho recs    Plan:   • Continue RICE therapy   • C/w PT, with dispo to acute rehab

## 2023-11-01 NOTE — PROGRESS NOTE ADULT - PROVIDER SPECIALTY LIST ADULT
Electrophysiology
Gastroenterology
Hospitalist
Hospitalist
MICU
Neurology
Neurology
Neurosurgery
Neurosurgery
Rehab Medicine
Rehab Medicine
Hospitalist
Internal Medicine
MICU
MICU
Neurology
Neurology
Rehab Medicine
Hospitalist
Neurology
Internal Medicine

## 2023-11-01 NOTE — PROGRESS NOTE ADULT - PROBLEM SELECTOR PLAN 5
HbA1c 5.7%; monitor blood glucose, trial of lifestyle modifications, repeat HbA1c in a few months.    Plan:  -NTD
HbA1c 5.7%; monitor blood glucose, trial of lifestyle modifications, repeat HbA1c in a few months.    Plan:  -NTD
HbA1c 5.7%; monitor blood glucose, trial of lifestyle modifications, repeat HbA1c in a few months
HbA1c 5.7%; monitor blood glucose, trial of lifestyle modifications, repeat HbA1c in a few months.    Plan:  -NTD
Borderline; asymptomatic; free T4 WNL; likely elevated due to non-thyroidal illness, can repeat TFTs in a few weeks as outpatient.    Plan:  -NTD
HbA1c 5.7%; monitor blood glucose, trial of lifestyle modifications, repeat HbA1c in a few months
HbA1c 5.7%; monitor blood glucose, trial of lifestyle modifications, repeat HbA1c in a few months

## 2023-11-01 NOTE — PROGRESS NOTE ADULT - PROBLEM SELECTOR PROBLEM 4
Elevated TSH
Essential hypertension
Elevated TSH

## 2023-11-01 NOTE — PROGRESS NOTE ADULT - PROBLEM SELECTOR PLAN 2
10/31 AST/ALT: 266/424. Unknown b/l, no prior records. | Likely 2/2 hydralazine use vs cholestatic vs alcoholic hepatitis (less likely due to ALT>AST), R factor 14.8.   - 10/31 RUQ US: unremarkable     • GI consulted, appreciate recs       » C/w statin       » avoid further use of hydralazine       » counseled extensively to avoid further alcohol use (reports he drinks a pack of beer every weekend) 10/31 AST/ALT: 266/424. Unknown b/l, no prior records. | Likely 2/2 hydralazine use vs cholestatic vs alcoholic hepatitis (less likely due to ALT>AST), R factor 14.8.   - 10/31 RUQ US: unremarkable  - 11/01: LFTs downtrending, Continues to deny abd pain.     • GI consulted, appreciate recs:       » C/w statin       » avoid further use of hydralazine       » Counseled extensively to avoid further alcohol use (reports he drinks a pack of beer every weekend)

## 2023-11-01 NOTE — PROGRESS NOTE ADULT - ASSESSMENT
58 y/o M pmhx significant for prediabetes, HTN who initially presented to [facility] with complaints of dizziness for 1 week and he was further admitted for workup due to suspicion of CVA.    GI initially consulted due to elevated LFTs with no baseline LFTs reported or checked upon admission. As per patient, no prior history or family history of liver disease. ALT predominant elevation, likely due to hydralazine use vs alcoholic hepatitis (less likely due to ALT>AST). Cholestatic etiology less likely as abdominal US obtained revealed liver within normal limits and R factor 14.8    On assessment patient denies abdominal pain, nausea, vomiting, constipation, diarrhea.    Recommendations:  -Avoid hydralazine use  -Okay to resume statin, as less likely etiology of elevated LFTs  -Monitor LFTs, will expect to downtrend after cessation of hydralazine if this is the etiology  - patient on alcohol cessation  58 y/o M pmhx significant for prediabetes, HTN who initially presented to [facility] with complaints of dizziness for 1 week and he was further admitted for workup due to suspicion of CVA.    GI initially consulted due to elevated LFTs with no baseline LFTs reported or checked upon admission. As per patient, no prior history or family history of liver disease. ALT predominant elevation, likely due to hydralazine use vs alcoholic hepatitis (less likely due to ALT>AST). Cholestatic etiology less likely as abdominal US obtained revealed liver within normal limits and no cholecystitis. R factor 14.8.     On assessment patient denies abdominal pain, nausea, vomiting, constipation, diarrhea.    Recommendations:  -Avoid hydralazine use  -Okay to resume statin, as less likely etiology of elevated LFTs  -Monitor LFTs  -Counseled extensively on alcohol cessation

## 2023-11-01 NOTE — PROGRESS NOTE ADULT - PROBLEM SELECTOR PLAN 2
Pt. reports h/o trauma; x-ray reviewed, shows "medial knee joint mild to moderate narrowing, joint effusion, no acute fracture or dislocation;" appreciate Ortho recs; sx resolved w/ SUBHASH, pain control, PT

## 2023-11-01 NOTE — PROGRESS NOTE ADULT - PROBLEM SELECTOR PROBLEM 2
Acute pain of left knee
Acute pain of left knee
Transaminitis
Acute pain of left knee

## 2023-11-03 PROBLEM — I10 ESSENTIAL (PRIMARY) HYPERTENSION: Chronic | Status: ACTIVE | Noted: 2023-10-21

## 2023-11-03 LAB
ANA TITR SER: NEGATIVE — SIGNIFICANT CHANGE UP
ANA TITR SER: NEGATIVE — SIGNIFICANT CHANGE UP

## 2023-11-13 DIAGNOSIS — R42 DIZZINESS AND GIDDINESS: ICD-10-CM

## 2023-11-13 DIAGNOSIS — R73.03 PREDIABETES: ICD-10-CM

## 2023-11-13 DIAGNOSIS — Z91.148 PATIENT'S OTHER NONCOMPLIANCE WITH MEDICATION REGIMEN FOR OTHER REASON: ICD-10-CM

## 2023-11-13 DIAGNOSIS — R29.702 NIHSS SCORE 2: ICD-10-CM

## 2023-11-13 DIAGNOSIS — I63.89 OTHER CEREBRAL INFARCTION: ICD-10-CM

## 2023-11-13 DIAGNOSIS — R47.01 APHASIA: ICD-10-CM

## 2023-11-13 DIAGNOSIS — I67.7 CEREBRAL ARTERITIS, NOT ELSEWHERE CLASSIFIED: ICD-10-CM

## 2023-11-13 DIAGNOSIS — I67.2 CEREBRAL ATHEROSCLEROSIS: ICD-10-CM

## 2023-11-13 DIAGNOSIS — H81.10 BENIGN PAROXYSMAL VERTIGO, UNSPECIFIED EAR: ICD-10-CM

## 2023-11-13 DIAGNOSIS — I10 ESSENTIAL (PRIMARY) HYPERTENSION: ICD-10-CM

## 2023-11-13 DIAGNOSIS — R74.01 ELEVATION OF LEVELS OF LIVER TRANSAMINASE LEVELS: ICD-10-CM

## 2023-11-13 DIAGNOSIS — T46.5X6A UNDERDOSING OF OTHER ANTIHYPERTENSIVE DRUGS, INITIAL ENCOUNTER: ICD-10-CM

## 2023-11-13 DIAGNOSIS — I16.1 HYPERTENSIVE EMERGENCY: ICD-10-CM

## 2023-11-13 DIAGNOSIS — E78.5 HYPERLIPIDEMIA, UNSPECIFIED: ICD-10-CM

## 2023-11-13 DIAGNOSIS — M25.562 PAIN IN LEFT KNEE: ICD-10-CM

## 2023-11-13 DIAGNOSIS — R94.6 ABNORMAL RESULTS OF THYROID FUNCTION STUDIES: ICD-10-CM

## 2023-11-17 ENCOUNTER — APPOINTMENT (OUTPATIENT)
Dept: NEUROLOGY | Facility: CLINIC | Age: 58
End: 2023-11-17
Payer: COMMERCIAL

## 2023-11-17 VITALS
HEART RATE: 86 BPM | TEMPERATURE: 97.3 F | HEIGHT: 73 IN | SYSTOLIC BLOOD PRESSURE: 131 MMHG | OXYGEN SATURATION: 99 % | BODY MASS INDEX: 28.49 KG/M2 | WEIGHT: 215 LBS | DIASTOLIC BLOOD PRESSURE: 95 MMHG

## 2023-11-17 PROCEDURE — 99204 OFFICE O/P NEW MOD 45 MIN: CPT

## 2023-12-18 ENCOUNTER — APPOINTMENT (OUTPATIENT)
Dept: HEART AND VASCULAR | Facility: CLINIC | Age: 58
End: 2023-12-18

## 2024-01-18 ENCOUNTER — NON-APPOINTMENT (OUTPATIENT)
Age: 59
End: 2024-01-18

## 2024-01-18 ENCOUNTER — APPOINTMENT (OUTPATIENT)
Dept: HEART AND VASCULAR | Facility: CLINIC | Age: 59
End: 2024-01-18
Payer: COMMERCIAL

## 2024-01-19 PROCEDURE — 93298 REM INTERROG DEV EVAL SCRMS: CPT

## 2024-02-12 ENCOUNTER — INPATIENT (INPATIENT)
Facility: HOSPITAL | Age: 59
LOS: 0 days | Discharge: HOME HEALTH | DRG: 305 | End: 2024-02-13
Attending: PSYCHIATRY & NEUROLOGY | Admitting: PSYCHIATRY & NEUROLOGY
Payer: COMMERCIAL

## 2024-02-12 VITALS
SYSTOLIC BLOOD PRESSURE: 190 MMHG | OXYGEN SATURATION: 96 % | RESPIRATION RATE: 16 BRPM | HEIGHT: 74 IN | WEIGHT: 162.04 LBS | TEMPERATURE: 98 F | DIASTOLIC BLOOD PRESSURE: 147 MMHG | HEART RATE: 73 BPM

## 2024-02-12 LAB
ANION GAP SERPL CALC-SCNC: 11 MMOL/L — SIGNIFICANT CHANGE UP (ref 5–17)
BASOPHILS # BLD AUTO: 0.05 K/UL — SIGNIFICANT CHANGE UP (ref 0–0.2)
BASOPHILS NFR BLD AUTO: 0.8 % — SIGNIFICANT CHANGE UP (ref 0–2)
BUN SERPL-MCNC: 17 MG/DL — SIGNIFICANT CHANGE UP (ref 7–23)
CALCIUM SERPL-MCNC: 9.5 MG/DL — SIGNIFICANT CHANGE UP (ref 8.4–10.5)
CHLORIDE SERPL-SCNC: 107 MMOL/L — SIGNIFICANT CHANGE UP (ref 96–108)
CK MB CFR SERPL CALC: 1.4 NG/ML — SIGNIFICANT CHANGE UP (ref 0–6.7)
CK SERPL-CCNC: 89 U/L — SIGNIFICANT CHANGE UP (ref 30–200)
CO2 SERPL-SCNC: 24 MMOL/L — SIGNIFICANT CHANGE UP (ref 22–31)
CREAT SERPL-MCNC: 0.86 MG/DL — SIGNIFICANT CHANGE UP (ref 0.5–1.3)
EGFR: 99 ML/MIN/1.73M2 — SIGNIFICANT CHANGE UP
EOSINOPHIL # BLD AUTO: 0.12 K/UL — SIGNIFICANT CHANGE UP (ref 0–0.5)
EOSINOPHIL NFR BLD AUTO: 1.8 % — SIGNIFICANT CHANGE UP (ref 0–6)
GLUCOSE SERPL-MCNC: 103 MG/DL — HIGH (ref 70–99)
HCT VFR BLD CALC: 44.9 % — SIGNIFICANT CHANGE UP (ref 39–50)
HGB BLD-MCNC: 15.3 G/DL — SIGNIFICANT CHANGE UP (ref 13–17)
IMM GRANULOCYTES NFR BLD AUTO: 0.3 % — SIGNIFICANT CHANGE UP (ref 0–0.9)
LYMPHOCYTES # BLD AUTO: 2.09 K/UL — SIGNIFICANT CHANGE UP (ref 1–3.3)
LYMPHOCYTES # BLD AUTO: 31.6 % — SIGNIFICANT CHANGE UP (ref 13–44)
MCHC RBC-ENTMCNC: 32 PG — SIGNIFICANT CHANGE UP (ref 27–34)
MCHC RBC-ENTMCNC: 34.1 GM/DL — SIGNIFICANT CHANGE UP (ref 32–36)
MCV RBC AUTO: 93.9 FL — SIGNIFICANT CHANGE UP (ref 80–100)
MONOCYTES # BLD AUTO: 0.62 K/UL — SIGNIFICANT CHANGE UP (ref 0–0.9)
MONOCYTES NFR BLD AUTO: 9.4 % — SIGNIFICANT CHANGE UP (ref 2–14)
NEUTROPHILS # BLD AUTO: 3.71 K/UL — SIGNIFICANT CHANGE UP (ref 1.8–7.4)
NEUTROPHILS NFR BLD AUTO: 56.1 % — SIGNIFICANT CHANGE UP (ref 43–77)
NRBC # BLD: 0 /100 WBCS — SIGNIFICANT CHANGE UP (ref 0–0)
PLATELET # BLD AUTO: 228 K/UL — SIGNIFICANT CHANGE UP (ref 150–400)
POTASSIUM SERPL-MCNC: 4.6 MMOL/L — SIGNIFICANT CHANGE UP (ref 3.5–5.3)
POTASSIUM SERPL-SCNC: 4.6 MMOL/L — SIGNIFICANT CHANGE UP (ref 3.5–5.3)
RBC # BLD: 4.78 M/UL — SIGNIFICANT CHANGE UP (ref 4.2–5.8)
RBC # FLD: 13.2 % — SIGNIFICANT CHANGE UP (ref 10.3–14.5)
SODIUM SERPL-SCNC: 142 MMOL/L — SIGNIFICANT CHANGE UP (ref 135–145)
TROPONIN T, HIGH SENSITIVITY RESULT: 7 NG/L — SIGNIFICANT CHANGE UP (ref 0–51)
WBC # BLD: 6.61 K/UL — SIGNIFICANT CHANGE UP (ref 3.8–10.5)
WBC # FLD AUTO: 6.61 K/UL — SIGNIFICANT CHANGE UP (ref 3.8–10.5)

## 2024-02-12 PROCEDURE — 99285 EMERGENCY DEPT VISIT HI MDM: CPT

## 2024-02-12 PROCEDURE — 70450 CT HEAD/BRAIN W/O DYE: CPT | Mod: 26,MA,59

## 2024-02-12 PROCEDURE — 70498 CT ANGIOGRAPHY NECK: CPT | Mod: 26,MA

## 2024-02-12 PROCEDURE — 70496 CT ANGIOGRAPHY HEAD: CPT | Mod: 26,MA

## 2024-02-12 RX ORDER — HYDRALAZINE HCL 50 MG
20 TABLET ORAL ONCE
Refills: 0 | Status: DISCONTINUED | OUTPATIENT
Start: 2024-02-12 | End: 2024-02-12

## 2024-02-12 RX ORDER — NIFEDIPINE 30 MG
30 TABLET, EXTENDED RELEASE 24 HR ORAL DAILY
Refills: 0 | Status: DISCONTINUED | OUTPATIENT
Start: 2024-02-13 | End: 2024-02-13

## 2024-02-12 RX ORDER — INFLUENZA VIRUS VACCINE 15; 15; 15; 15 UG/.5ML; UG/.5ML; UG/.5ML; UG/.5ML
0.5 SUSPENSION INTRAMUSCULAR ONCE
Refills: 0 | Status: DISCONTINUED | OUTPATIENT
Start: 2024-02-12 | End: 2024-02-13

## 2024-02-12 RX ORDER — NIFEDIPINE 30 MG/1
30 TABLET, EXTENDED RELEASE ORAL DAILY
Qty: 30 | Refills: 1 | Status: ACTIVE | COMMUNITY
Start: 2024-02-12 | End: 1900-01-01

## 2024-02-12 RX ORDER — NIFEDIPINE 30 MG
30 TABLET, EXTENDED RELEASE 24 HR ORAL ONCE
Refills: 0 | Status: COMPLETED | OUTPATIENT
Start: 2024-02-12 | End: 2024-02-12

## 2024-02-12 RX ORDER — ATORVASTATIN CALCIUM 80 MG/1
40 TABLET, FILM COATED ORAL AT BEDTIME
Refills: 0 | Status: DISCONTINUED | OUTPATIENT
Start: 2024-02-12 | End: 2024-02-13

## 2024-02-12 RX ORDER — ENOXAPARIN SODIUM 100 MG/ML
40 INJECTION SUBCUTANEOUS EVERY 24 HOURS
Refills: 0 | Status: DISCONTINUED | OUTPATIENT
Start: 2024-02-12 | End: 2024-02-13

## 2024-02-12 RX ORDER — MECLIZINE HCL 12.5 MG
12.5 TABLET ORAL EVERY 8 HOURS
Refills: 0 | Status: DISCONTINUED | OUTPATIENT
Start: 2024-02-12 | End: 2024-02-13

## 2024-02-12 RX ORDER — LISINOPRIL 2.5 MG/1
20 TABLET ORAL DAILY
Refills: 0 | Status: DISCONTINUED | OUTPATIENT
Start: 2024-02-12 | End: 2024-02-13

## 2024-02-12 RX ORDER — HYDRALAZINE HCL 50 MG
10 TABLET ORAL ONCE
Refills: 0 | Status: COMPLETED | OUTPATIENT
Start: 2024-02-12 | End: 2024-02-12

## 2024-02-12 RX ORDER — ASPIRIN/CALCIUM CARB/MAGNESIUM 324 MG
81 TABLET ORAL DAILY
Refills: 0 | Status: DISCONTINUED | OUTPATIENT
Start: 2024-02-13 | End: 2024-02-13

## 2024-02-12 RX ORDER — LABETALOL HCL 100 MG
10 TABLET ORAL ONCE
Refills: 0 | Status: COMPLETED | OUTPATIENT
Start: 2024-02-12 | End: 2024-02-12

## 2024-02-12 RX ORDER — MECLIZINE HYDROCHLORIDE 12.5 MG/1
12.5 TABLET ORAL 3 TIMES DAILY
Qty: 30 | Refills: 0 | Status: ACTIVE | COMMUNITY
Start: 2024-02-12 | End: 1900-01-01

## 2024-02-12 RX ADMIN — Medication 30 MILLIGRAM(S): at 13:35

## 2024-02-12 RX ADMIN — LISINOPRIL 20 MILLIGRAM(S): 2.5 TABLET ORAL at 13:34

## 2024-02-12 RX ADMIN — Medication 10 MILLIGRAM(S): at 19:11

## 2024-02-12 RX ADMIN — ATORVASTATIN CALCIUM 40 MILLIGRAM(S): 80 TABLET, FILM COATED ORAL at 22:10

## 2024-02-12 RX ADMIN — ENOXAPARIN SODIUM 40 MILLIGRAM(S): 100 INJECTION SUBCUTANEOUS at 18:57

## 2024-02-12 RX ADMIN — Medication 10 MILLIGRAM(S): at 16:01

## 2024-02-12 NOTE — ED PROVIDER NOTE - CLINICAL SUMMARY MEDICAL DECISION MAKING FREE TEXT BOX
Pt has no symptoms of htn urgency or emergency. Will give pt his home BP medications Will work pt up for hypertensive urgency v emergency v stroke as well as refill home BP medication.

## 2024-02-12 NOTE — H&P ADULT - HISTORY OF PRESENT ILLNESS
58y Male with PMHx of  HTN (noncompliant with meds), prior stroke in the R periatrial white matter and L centrum semiovale 2/2 to large vessel athero s/p DAPT x 90 days who was sent in from the stroke outpatient office for elevated BP. Majority of history translated by family at bedside as patient is primarily Malagasy speaking. Reports that he had 2 episodes of transient expressive aphasia, the last one being about 1 month ago. Does not remember when the episode prior was. Family at bedside states that during both of these episodes he would just be making sounds and no words would come out. States that he also has been having room spinning dizziness over the last 1 month, exacerbated with moving his head. Reports that he has been having to use a cane more frequently to help him ambulate 2/2 to his dizziness. Also notes that he ran out of his BP medications 2 weeks ago. Went to an outpatient follow up appointment with Татьяна and was found to have an SBP in the 200s therefore was sent to the ED for further management. Noncon CTH negative for acute pathology, CTA H/N with unchanged multifocal short segment stenosis of b/l EVERARDO and distal b/l MCAs          LABS:                        15.3   6.61  )-----------( 228      ( 12 Feb 2024 13:06 )             44.9     02-12    142  |  107  |  17  ----------------------------<  103<H>  4.6   |  24  |  0.86    Ca    9.5      12 Feb 2024 13:06        CARDIAC MARKERS ( 12 Feb 2024 13:06 )  x     / x     / 89 U/L / x     / 1.4 ng/mL      Urinalysis Basic - ( 12 Feb 2024 13:06 )    Color: x / Appearance: x / SG: x / pH: x  Gluc: 103 mg/dL / Ketone: x  / Bili: x / Urobili: x   Blood: x / Protein: x / Nitrite: x   Leuk Esterase: x / RBC: x / WBC x   Sq Epi: x / Non Sq Epi: x / Bacteria: x        RADIOLOGY & ADDITIONAL STUDIES:      -----------------------------------------------------------------------------------------------------------------  IV-tPA (Y/N):    ***                              Bolus time:    Alteplase Dose Verification w/ RN:  Reason IV-tPA not given: ***   58y Male with PMHx of  HTN (noncompliant with meds), prior stroke in the R periatrial white matter and L centrum semiovale 2/2 to large vessel athero s/p DAPT x 90 days who was sent in from the stroke outpatient office for elevated BP. Majority of history translated by family at bedside as patient is primarily Scottish speaking. Reports that he had 2 episodes of transient expressive aphasia, the last one being about 1 month ago. Does not remember when the episode prior was. Family at bedside states that during both of these episodes he would just be making sounds and no words would come out. States that he also has been having room spinning dizziness over the last 1 month, exacerbated with moving his head. Reports that he has been having to use a cane more frequently to help him ambulate 2/2 to his dizziness. Also notes that he ran out of his BP medications 2 weeks ago. Went to an outpatient follow up appointment with Татьяна and was found to have an SBP in the 200s therefore was sent to the ED for further management. Noncon CTH negative for acute pathology, CTA H/N with unchanged multifocal short segment stenosis of b/l EVERARDO and distal b/l MCA

## 2024-02-12 NOTE — ED ADULT TRIAGE NOTE - CHIEF COMPLAINT QUOTE
Pt presents to ED C/O HTN at home. Hx stroke in Oct with baseline intermittent slurred speech. Pt denies symptoms at this time. Family at bedside states, " He ran out of his BP medication and hasn't taken it for 2 weeks." Pt Dr. El. EKG in progress.

## 2024-02-12 NOTE — ED PROVIDER NOTE - PHYSICAL EXAMINATION
PHYSICAL EXAM:    Constitutional: WDWN resting comfortably in bed; NAD  Head: NC/AT  Eyes: PERRL, EOMI, clear conjunctiva  ENT: no nasal discharge; uvula midline, no oropharyngeal erythema or exudates; MMM  Neck: supple; no JVD or thyromegaly  Respiratory: CTA B/L; no W/R/R, no retractions  Cardiac: +S1/S2; RRR; no M/R/G;  Gastrointestinal: soft, NT/ND; no rebound or guarding; +BSx4  Extremities: WWP, no clubbing or cyanosis; no peripheral edema  Musculoskeletal: NROM x4; no joint swelling, tenderness or erythema  Vascular: 2+ radial, femoral, DP/PT pulses B/L  Dermatologic: skin warm, dry and intact; no rashes, wounds, or scars  Neurologic: AAOx3; CNII-XII grossly intact; no focal deficits  Psychiatric: affect and characteristics of appearance, verbalizations, behaviors are appropriate PHYSICAL EXAM:    Constitutional: WDWN resting comfortably in bed; NAD  Head: NC/AT  Eyes: PERRL, EOMI, clear conjunctiva  ENT: no nasal discharge; uvula midline, no oropharyngeal erythema or exudates; MMM  Neck: supple; no JVD or thyromegaly  Respiratory: CTA B/L; no W/R/R, no retractions  Cardiac: +S1/S2; RRR; no M/R/G;  Gastrointestinal: soft, NT/ND; no rebound or guarding; +BSx4  Extremities: WWP, no clubbing or cyanosis; no peripheral edema  Musculoskeletal: NROM x4; no joint swelling, tenderness or erythema  Vascular: 2+ radial, femoral, DP/PT pulses B/L  Dermatologic: skin warm, dry and intact; no rashes, wounds  Neurologic: AAOx3; CNII-XII grossly intact;  Psychiatric: affect and characteristics of appearance, verbalizations, behaviors are appropriate

## 2024-02-12 NOTE — ED PROVIDER NOTE - ATTENDING CONTRIBUTION TO CARE
Patient w elevated BP , currently assymptomatic, non compliant w meds for 2 weeks after ran out   pt is a poor historian initially stating had slurred speech episode one month ago and denying other symptoms but later endorsing intermittent dizziness more frequently , pt seen by stroke team who advised admission to stroke unit for BP management and further neuro evaluation  pt currently neuro intact.

## 2024-02-12 NOTE — PATIENT PROFILE ADULT - FALL HARM RISK - UNIVERSAL INTERVENTIONS
Bed in lowest position, wheels locked, appropriate side rails in place/Call bell, personal items and telephone in reach/Instruct patient to call for assistance before getting out of bed or chair/Non-slip footwear when patient is out of bed/[place] to call system/Physically safe environment - no spills, clutter or unnecessary equipment/Purposeful Proactive Rounding/Room/bathroom lighting operational, light cord in reach

## 2024-02-12 NOTE — H&P ADULT - NSHPREVIEWOFSYSTEMS_GEN_ALL_CORE
Constitutional: No fever, weight loss or fatigue  Eyes: No eye pain, visual disturbances, or discharge  ENMT:  No difficulty hearing, tinnitus; No sinus or throat pain  Neck: No pain or stiffness  Respiratory: No cough, wheezing, chills or hemoptysis  Cardiovascular: No chest pain, palpitations, shortness of breath, or leg swelling  Gastrointestinal: No abdominal pain. No nausea, vomiting or hematemesis; No diarrhea or constipation. No hematochezia.  Genitourinary: No dysuria, frequency, hematuria or incontinence  Neurological: As per HPI  Skin: No itching, burning, rashes or lesions   Endocrine: No heat or cold intolerance; No hair loss  Musculoskeletal: No joint pain or swelling; No muscle, back or extremity pain  Heme/Lymph: No easy bruising or bleeding gums

## 2024-02-12 NOTE — ED PROVIDER NOTE - OBJECTIVE STATEMENT
58y Male complaining of HTN, s/p stroke in november. Pt states he ran out of his blood pressure medication 2 weeks ago. He currently takes Nifedipine 30mg and Lisinopril 20mg. Pt has no complaints aside from his medication running out. 58y Male complaining of HTN, s/p stroke in november. Pt states he ran out of his blood pressure medication 2 weeks ago. He currently takes Nifedipine 30mg and Lisinopril 20mg. Pt was sent in by his neurologist for workup of potential stroke. Pt has had two episodes of slurred speech recently.

## 2024-02-12 NOTE — H&P ADULT - NSHPLABSRESULTS_GEN_ALL_CORE
Fingerstick Blood Glucose: CAPILLARY BLOOD GLUCOSE        LABS:                        15.3   6.61  )-----------( 228      ( 12 Feb 2024 13:06 )             44.9     02-12    142  |  107  |  17  ----------------------------<  103<H>  4.6   |  24  |  0.86    Ca    9.5      12 Feb 2024 13:06        CARDIAC MARKERS ( 12 Feb 2024 13:06 )  x     / x     / 89 U/L / x     / 1.4 ng/mL      Urinalysis Basic - ( 12 Feb 2024 13:06 )    Color: x / Appearance: x / SG: x / pH: x  Gluc: 103 mg/dL / Ketone: x  / Bili: x / Urobili: x   Blood: x / Protein: x / Nitrite: x   Leuk Esterase: x / RBC: x / WBC x   Sq Epi: x / Non Sq Epi: x / Bacteria: x        RADIOLOGY & ADDITIONAL STUDIES:  < from: CT Head No Cont (02.12.24 @ 14:24) >    IMPRESSION:    1.  No evidence of acute intracranial hemorrhage or midline shift.  2.  Chronic ischemic changes as discussed above. If there is continued   concern for acute neurologic compromise, recommend MRI of the brain for   further evaluation.    < end of copied text >    < from: CT Angio Head and Neck w/ IV Cont (02.12.24 @ 15:13) >    IMPRESSION:    CTA brain:  No significant interval change from CTA brain 10/21/2023    Multifocal short segment moderate stenoses of the bilateral anterior   cerebral arteries. Multifocal milder stenoses of the distal bilateral MCA   branches. Findings could represent the presence of atherosclerosis. The   presence of vasculitis is not excluded.    No vascular aneurysm. No AVM.    Venous system is well opacified, no evidence for venous sinus or cortical   vein thrombosis.    CTA neck:  No flow-limiting stenosis, evidence for arterial dissection, or vascular   aneurysm.    < end of copied text >        -----------------------------------------------------------------------------------------------------------------  IV-tPA (Y/N):    N  Reason IV-tPA not given: DARRIAN low NIHSS

## 2024-02-12 NOTE — ED ADULT NURSE NOTE - OBJECTIVE STATEMENT
Presents from and per Dr. El's office for htn, notes off htn meds x 2 weeks as ran out, denies acute symptoms. PMH stroke with residual dizziness and int slurring since then.     On assessment- AOx4, breathing even and unlabored on RA, no apparent distress, VSS in triage, able to speak in clear coherent sentences with int slurring, steady gait unassisted, neuro intact with no apparent facial asymmetry, PERRLA.

## 2024-02-12 NOTE — H&P ADULT - NSHPPHYSICALEXAM_GEN_ALL_CORE
Constitutional: No acute distress, conversant  Eyes: Anicteric sclerae, moist conjunctivae, see below for CNs  Extremities: No edema    Neurologic:  -Mental status: Awake, alert, oriented to person, place, and time. Speech is fluent with intact naming, repetition, and comprehension, no dysarthria. Recent and remote memory intact. Follows commands. Attention/concentration intact. Fund of knowledge appropriate.  -Cranial nerves:   II: Visual fields are full to confrontation.  III, IV, VI: Extraocular movements are intact without nystagmus. Pupils equally round and reactive to light  V:  Facial sensation V1-V3 equal and intact   VII: L facial asymmetry (unclear if chronic per family at bedside, not documented in prior notes during last admission)  XII: Tongue protrudes midline  Motor: Normal bulk and tone. No pronator drift. Strength bilateral upper extremity 5/5, bilateral lower extremities 5/5.  Sensation: Intact to light touch bilaterally. No neglect or extinction on double simultaneous testing.  Coordination: No dysmetria on finger-to-nose bilaterally  Gait: Ambulated without a cane, narrow based gait however does appear to be unsteady    NIHSS: 1

## 2024-02-12 NOTE — ED ADULT NURSE NOTE - AS PAIN REST
0 (no pain/absence of nonverbal indicators of pain) 44 y/o male with no PMH. Presents to the Ed with c/c of upper left sided back pain due to a 5cm by 4cm raised, red, warm to touch swelling under the skin first noticed 1.5 months ago.

## 2024-02-12 NOTE — H&P ADULT - ASSESSMENT
58y Male with PMHx of  HTN (noncompliant with meds), prior stroke in the R periatrial white matter and L centrum semiovale 2/2 to large vessel athero s/p DAPT x 90 days who was sent in from the stroke outpatient office for elevated BP. Has had two transient episodes of expressive aphasia > 1 month ago and persistent dizziness over the last 1 month. Had an outpatient follow up with our stroke NP and was found to have SBP > 200 therefore was sent to the ED for further management. Noncon CTH negative for acute pathology, CTA H/N with unchanged multifocal short segment stenosis of b/l EVERARDO and distal b/l MCAs   58y Male with PMHx of  HTN (noncompliant with meds), prior stroke in the R periatrial white matter and L centrum semiovale 2/2 to large vessel athero s/p DAPT x 90 days who was sent in from the stroke outpatient office for elevated BP. Has had two transient episodes of expressive aphasia > 1 month ago and persistent dizziness over the last 1 month. Had an outpatient follow up with our stroke NP and was found to have SBP > 200 therefore was sent to the ED for further management. Noncon CTH negative for acute pathology, CTA H/N with unchanged multifocal short segment stenosis of b/l EVERARDO and distal b/l MCAs. Admitted to stroke tele for further blood pressure control    Neuro  #CVA workup  - continue home aspirin 81mg   - f/u AM PRU  - continue home atorvastatin 40mg daily  - q4hr stroke neuro checks and vitals  - +/- repeat MRI Brain without contrast, ILR interrogation  - ED HCT Results: negative  - ED CTA Results: unchanged multifocal short segment stenosis of b/l EVERARDO and distal b/l MCAs  - Stroke education    Cards  #HTN  - permissive hypertension, Goal -180  - continue home Lisinopril and Nifedipine, uptitrate PRN  - ED EKG Results:    #HLD  - high dose statin as above in CVA  - LDL results: pending    Pulm  - call provider if SPO2 < 94%    GI  #Nutrition/Fluids/Electrolytes   - replete K<4 and Mg <2  - Diet: DASH/TLC  - IVF: None    Renal  - daily BMPs    Infectious Disease  - afebrile on admission    Endocrine  #DM  - A1C results: pending     - TSH results: pending    DVT Prophylaxis  - lovenox sq for DVT prophylaxis   - SCDs for DVT prophylaxis       Dispo: pending PT/OT     Discussed daily hospital plans and goals with patient and family at bedside    Discussed with Neurology Attending, Dr. Briggs

## 2024-02-12 NOTE — ED PROVIDER NOTE - NS ED ATTENDING STATEMENT MOD
I have seen and examined this patient and fully participated in the care of this patient as the teaching attending.  The service was shared with the CRISTINA.  I reviewed and verified the documentation.

## 2024-02-13 ENCOUNTER — TRANSCRIPTION ENCOUNTER (OUTPATIENT)
Age: 59
End: 2024-02-13

## 2024-02-13 VITALS — TEMPERATURE: 98 F

## 2024-02-13 DIAGNOSIS — I10 ESSENTIAL (PRIMARY) HYPERTENSION: ICD-10-CM

## 2024-02-13 DIAGNOSIS — E78.5 HYPERLIPIDEMIA, UNSPECIFIED: ICD-10-CM

## 2024-02-13 DIAGNOSIS — R42 DIZZINESS AND GIDDINESS: ICD-10-CM

## 2024-02-13 LAB
ANION GAP SERPL CALC-SCNC: 11 MMOL/L — SIGNIFICANT CHANGE UP (ref 5–17)
BUN SERPL-MCNC: 11 MG/DL — SIGNIFICANT CHANGE UP (ref 7–23)
CALCIUM SERPL-MCNC: 9.2 MG/DL — SIGNIFICANT CHANGE UP (ref 8.4–10.5)
CHLORIDE SERPL-SCNC: 103 MMOL/L — SIGNIFICANT CHANGE UP (ref 96–108)
CHOLEST SERPL-MCNC: 178 MG/DL — SIGNIFICANT CHANGE UP
CO2 SERPL-SCNC: 25 MMOL/L — SIGNIFICANT CHANGE UP (ref 22–31)
CREAT SERPL-MCNC: 0.78 MG/DL — SIGNIFICANT CHANGE UP (ref 0.5–1.3)
EGFR: 102 ML/MIN/1.73M2 — SIGNIFICANT CHANGE UP
GLUCOSE SERPL-MCNC: 96 MG/DL — SIGNIFICANT CHANGE UP (ref 70–99)
HCT VFR BLD CALC: 44.5 % — SIGNIFICANT CHANGE UP (ref 39–50)
HDLC SERPL-MCNC: 31 MG/DL — LOW
HGB BLD-MCNC: 15.4 G/DL — SIGNIFICANT CHANGE UP (ref 13–17)
LIPID PNL WITH DIRECT LDL SERPL: 125 MG/DL — HIGH
MAGNESIUM SERPL-MCNC: 1.8 MG/DL — SIGNIFICANT CHANGE UP (ref 1.6–2.6)
MCHC RBC-ENTMCNC: 32 PG — SIGNIFICANT CHANGE UP (ref 27–34)
MCHC RBC-ENTMCNC: 34.6 GM/DL — SIGNIFICANT CHANGE UP (ref 32–36)
MCV RBC AUTO: 92.5 FL — SIGNIFICANT CHANGE UP (ref 80–100)
NON HDL CHOLESTEROL: 147 MG/DL — HIGH
NRBC # BLD: 0 /100 WBCS — SIGNIFICANT CHANGE UP (ref 0–0)
PHOSPHATE SERPL-MCNC: 3.3 MG/DL — SIGNIFICANT CHANGE UP (ref 2.5–4.5)
PLATELET # BLD AUTO: 216 K/UL — SIGNIFICANT CHANGE UP (ref 150–400)
POTASSIUM SERPL-MCNC: 3.6 MMOL/L — SIGNIFICANT CHANGE UP (ref 3.5–5.3)
POTASSIUM SERPL-SCNC: 3.6 MMOL/L — SIGNIFICANT CHANGE UP (ref 3.5–5.3)
RBC # BLD: 4.81 M/UL — SIGNIFICANT CHANGE UP (ref 4.2–5.8)
RBC # FLD: 13.2 % — SIGNIFICANT CHANGE UP (ref 10.3–14.5)
SODIUM SERPL-SCNC: 139 MMOL/L — SIGNIFICANT CHANGE UP (ref 135–145)
TRIGL SERPL-MCNC: 112 MG/DL — SIGNIFICANT CHANGE UP
WBC # BLD: 6.11 K/UL — SIGNIFICANT CHANGE UP (ref 3.8–10.5)
WBC # FLD AUTO: 6.11 K/UL — SIGNIFICANT CHANGE UP (ref 3.8–10.5)

## 2024-02-13 PROCEDURE — 36415 COLL VENOUS BLD VENIPUNCTURE: CPT

## 2024-02-13 PROCEDURE — 80061 LIPID PANEL: CPT

## 2024-02-13 PROCEDURE — 84484 ASSAY OF TROPONIN QUANT: CPT

## 2024-02-13 PROCEDURE — 82550 ASSAY OF CK (CPK): CPT

## 2024-02-13 PROCEDURE — 97162 PT EVAL MOD COMPLEX 30 MIN: CPT

## 2024-02-13 PROCEDURE — 70496 CT ANGIOGRAPHY HEAD: CPT | Mod: MA

## 2024-02-13 PROCEDURE — 97165 OT EVAL LOW COMPLEX 30 MIN: CPT

## 2024-02-13 PROCEDURE — 85025 COMPLETE CBC W/AUTO DIFF WBC: CPT

## 2024-02-13 PROCEDURE — 82553 CREATINE MB FRACTION: CPT

## 2024-02-13 PROCEDURE — 70450 CT HEAD/BRAIN W/O DYE: CPT | Mod: MA

## 2024-02-13 PROCEDURE — 96374 THER/PROPH/DIAG INJ IV PUSH: CPT

## 2024-02-13 PROCEDURE — 99222 1ST HOSP IP/OBS MODERATE 55: CPT

## 2024-02-13 PROCEDURE — 99285 EMERGENCY DEPT VISIT HI MDM: CPT | Mod: 25

## 2024-02-13 PROCEDURE — 80048 BASIC METABOLIC PNL TOTAL CA: CPT

## 2024-02-13 PROCEDURE — 83735 ASSAY OF MAGNESIUM: CPT

## 2024-02-13 PROCEDURE — 85027 COMPLETE CBC AUTOMATED: CPT

## 2024-02-13 PROCEDURE — 93005 ELECTROCARDIOGRAM TRACING: CPT

## 2024-02-13 PROCEDURE — 70498 CT ANGIOGRAPHY NECK: CPT | Mod: MA

## 2024-02-13 PROCEDURE — 99223 1ST HOSP IP/OBS HIGH 75: CPT

## 2024-02-13 PROCEDURE — 84100 ASSAY OF PHOSPHORUS: CPT

## 2024-02-13 RX ORDER — MECLIZINE HCL 12.5 MG
1 TABLET ORAL
Qty: 90 | Refills: 1
Start: 2024-02-13 | End: 2024-04-12

## 2024-02-13 RX ORDER — ATORVASTATIN CALCIUM 80 MG/1
1 TABLET, FILM COATED ORAL
Qty: 30 | Refills: 3
Start: 2024-02-13 | End: 2024-06-11

## 2024-02-13 RX ORDER — NIFEDIPINE 30 MG
1 TABLET, EXTENDED RELEASE 24 HR ORAL
Qty: 30 | Refills: 2
Start: 2024-02-13 | End: 2024-05-12

## 2024-02-13 RX ORDER — NIFEDIPINE 30 MG
60 TABLET, EXTENDED RELEASE 24 HR ORAL DAILY
Refills: 0 | Status: DISCONTINUED | OUTPATIENT
Start: 2024-02-14 | End: 2024-02-13

## 2024-02-13 RX ORDER — ASPIRIN/CALCIUM CARB/MAGNESIUM 324 MG
1 TABLET ORAL
Qty: 30 | Refills: 2
Start: 2024-02-13 | End: 2024-05-12

## 2024-02-13 RX ORDER — LISINOPRIL 2.5 MG/1
1 TABLET ORAL
Qty: 30 | Refills: 3
Start: 2024-02-13 | End: 2024-06-11

## 2024-02-13 RX ORDER — NIFEDIPINE 30 MG
30 TABLET, EXTENDED RELEASE 24 HR ORAL ONCE
Refills: 0 | Status: COMPLETED | OUTPATIENT
Start: 2024-02-13 | End: 2024-02-13

## 2024-02-13 RX ADMIN — Medication 30 MILLIGRAM(S): at 06:07

## 2024-02-13 RX ADMIN — LISINOPRIL 20 MILLIGRAM(S): 2.5 TABLET ORAL at 06:06

## 2024-02-13 RX ADMIN — Medication 30 MILLIGRAM(S): at 14:07

## 2024-02-13 RX ADMIN — Medication 81 MILLIGRAM(S): at 11:26

## 2024-02-13 NOTE — OCCUPATIONAL THERAPY INITIAL EVALUATION ADULT - MODIFIED CLINICAL TEST OF SENSORY INTEGRATION IN BALANCE TEST
ambulating 100 feet with SBA using SC. Pt demonstrates decreased step sequence, good postural control, no LOB noted. ambulating 100 feet with CGA using SC. Pt demonstrates decreased step sequence, good postural control, no LOB noted.

## 2024-02-13 NOTE — OCCUPATIONAL THERAPY INITIAL EVALUATION ADULT - MODALITIES TREATMENT COMMENTS
Cranial Nerves II - XII: II: PERRLA; visual fields are full to confrontation III, IV, VI: EOMI, no nystagmus appreciated V: facial sensation intact to light touch V1-V3 b/l VII: no ptosis, mild facial droop on, symmetric eyebrow raise and closure VIII: hearing intact to finger rub b/l  XI: head turning and shoulder shrug intact b/l XII: tongue protrusion midline Visual Tracking H Test: b/l WFL; Visual Quadrant Testing: WFL except lower temporal quadrant on both sides

## 2024-02-13 NOTE — DISCHARGE NOTE PROVIDER - HOSPITAL COURSE
Hospital course:  58y Male with PMHx of  HTN (noncompliant with meds), prior stroke in the R periatrial white matter and L centrum semiovale 2/2 to large vessel athero s/p DAPT x 90 days who was sent in from the stroke outpatient office for elevated BP. Has had two transient episodes of expressive aphasia > 1 month ago and persistent dizziness over the last 1 month. Had an outpatient follow up with our stroke NP and was found to have SBP > 200 therefore was sent to the ED for further management. Noncon CTH negative for acute pathology, CTA H/N with unchanged multifocal short segment stenosis of b/l EVERARDO and distal b/l MCAs. Admitted to stroke tele for further blood pressure control. Increased home nifedipine to 60mg QD, continued home lisinopril 20mg. Discharged with 3 month supply of each medication.     Patient had the following workup done in house:  CT Head: negative   CT Angio Head/Neck: stable ICAD     Physical exam at discharge:  -Mental status: Awake, alert, oriented to person, place, and time. Speech is fluent with intact naming, repetition, and comprehension, no dysarthria. Recent and remote memory intact. Follows commands. Attention/concentration intact. Fund of knowledge appropriate.  -Cranial nerves:   II: Visual fields are full to confrontation.  III, IV, VI: Extraocular movements are intact without nystagmus. Pupils equally round and reactive to light  V:  Facial sensation V1-V3 equal and intact   VII: L facial asymmetry (unclear if chronic per family at bedside, not documented in prior notes during last admission)  XII: Tongue protrudes midline  Motor: Normal bulk and tone. No pronator drift. Strength bilateral upper extremity 5/5, bilateral lower extremities 5/5.  Sensation: Intact to light touch bilaterally. No neglect or extinction on double simultaneous testing.  Coordination: No dysmetria on finger-to-nose bilaterally  Gait: Ambulated without a cane, narrow based gait however does appear to be unsteady    New medications on discharge: Nifedipine 60mg XL QD  Labs to be followed up: none  Imaging to be done as outpatient: none  Further outpatient workup: none

## 2024-02-13 NOTE — CONSULT NOTE ADULT - SUBJECTIVE AND OBJECTIVE BOX
Patient is a 58y old  Male who presents with a chief complaint of     HPI:  58y Male with PMHx of  HTN (noncompliant with meds), prior stroke in the R periatrial white matter and L centrum semiovale 2/2 to large vessel athero s/p DAPT x 90 days who was sent in from the stroke outpatient office for elevated BP. Majority of history translated by family at bedside as patient is primarily Nepalese speaking. Reports that he had 2 episodes of transient expressive aphasia, the last one being about 1 month ago. Does not remember when the episode prior was. Family at bedside states that during both of these episodes he would just be making sounds and no words would come out. States that he also has been having room spinning dizziness over the last 1 month, exacerbated with moving his head. Reports that he has been having to use a cane more frequently to help him ambulate 2/2 to his dizziness. Also notes that he ran out of his BP medications 2 weeks ago. Went to an outpatient follow up appointment with Татьяна and was found to have an SBP in the 200s therefore was sent to the ED for further management. Noncon CTH negative for acute pathology, CTA H/N with unchanged multifocal short segment stenosis of b/l EVERARDO and distal b/l MCA   (12 Feb 2024 15:26)    PAST MEDICAL & SURGICAL HISTORY:  HTN (hypertension)      Stroke        MEDICATIONS  (STANDING):  aspirin enteric coated 81 milliGRAM(s) Oral daily  atorvastatin 40 milliGRAM(s) Oral at bedtime  enoxaparin Injectable 40 milliGRAM(s) SubCutaneous every 24 hours  influenza   Vaccine 0.5 milliLiter(s) IntraMuscular once  lisinopril 20 milliGRAM(s) Oral daily  NIFEdipine XL 30 milliGRAM(s) Oral daily    MEDICATIONS  (PRN):  meclizine 12.5 milliGRAM(s) Oral every 8 hours PRN Dizziness          Home Living Status :  lives with his cousin in an elevator accessible apartment building           -  Prior Home Care Services :  none         Baseline Functional Level Prior to Admission :             - ADL's/ IADL's :  independent          - Ambulatory status prior to admission :   walked with a cane         FAMILY HISTORY:      CBC Full  -  ( 13 Feb 2024 05:30 )  WBC Count : 6.11 K/uL  RBC Count : 4.81 M/uL  Hemoglobin : 15.4 g/dL  Hematocrit : 44.5 %  Platelet Count - Automated : 216 K/uL  Mean Cell Volume : 92.5 fl  Mean Cell Hemoglobin : 32.0 pg  Mean Cell Hemoglobin Concentration : 34.6 gm/dL  Auto Neutrophil # : x  Auto Lymphocyte # : x  Auto Monocyte # : x  Auto Eosinophil # : x  Auto Basophil # : x  Auto Neutrophil % : x  Auto Lymphocyte % : x  Auto Monocyte % : x  Auto Eosinophil % : x  Auto Basophil % : x      02-12    142  |  107  |  17  ----------------------------<  103<H>  4.6   |  24  |  0.86    Ca    9.5      12 Feb 2024 13:06        Urinalysis Basic - ( 12 Feb 2024 13:06 )    Color: x / Appearance: x / SG: x / pH: x  Gluc: 103 mg/dL / Ketone: x  / Bili: x / Urobili: x   Blood: x / Protein: x / Nitrite: x   Leuk Esterase: x / RBC: x / WBC x   Sq Epi: x / Non Sq Epi: x / Bacteria: x        Radiology :     < from: CT Head No Cont (02.12.24 @ 14:24) >    ACC: [id number] EXAM:  CT BRAIN   ORDERED BY: RAYSA HICKMAN     PROCEDURE DATE:  02/12/2024          INTERPRETATION:  EXAM: CT HEAD WITHOUT INTRAVENOUS CONTRAST    HISTORY: Dizziness, slurred speech, evaluation for intracranial   hemorrhage/stroke    TECHNIQUE: Multiple axial images were obtained from the skull base to the   vertex. Sagittal and coronal reformatted images were obtained from the   axial data set. The images were reviewed in brain and bone windows.    COMPARISON: MRI of the head October 22, 2023, CT/CTA of the head October 21, 2023    FINDINGS:    No acute intracranial hemorrhage. Chronic appearing infarctions in the   bilateral corona radiata/centrum semiovale. Areas of decreased   attenuation in the deep and periventricular white matter, compatible with   chronic small vessel disease. No hydrocephalus. The extra-axial spaces   and basal cisterns are within normal limits. No midline shift or mass   effect present.    The cranial cervical junction is within normal limits. The sella is not   expanded. No depressed calvarial fracture. The visualized paranasal   sinuses are well aerated. The visualized mastoid air cells are well   aerated. The visualized orbits are within normal limits.    IMPRESSION:    1.  No evidenceof acute intracranial hemorrhage or midline shift.  2.  Chronic ischemic changes as discussed above. If there is continued   concern for acute neurologic compromise, recommend MRI of the brain for   further evaluation.      < from: CT Angio Head w/ IV Cont (02.12.24 @ 15:13) >  ACC: [id number] EXAM:  CT ANGIO BRAIN (W)AW IC   ORDERED BY: RAYSA HICKMAN     ACC: [id number] EXAM:  CT ANGIO NECK (W)AW IC   ORDERED BY: RAYSA HICKMAN     PROCEDURE DATE:  02/12/2024          INTERPRETATION:  CT angiography of the brain and neck    CLINICAL INDICATION: Episodic dizziness and slurred speech.    TECHNIQUE: Direct axial CT scanning of the brain and neck was obtained   from the vertex to the level of the clavicular heads after the dynamic   intravenous injection of 80 cc of Isovue-370. 20 cc discarded. Sagittal   and coronal maximum intensity projection reformats were provided.    Three-dimensional reconstructions were performed by the radiologist using   the [id number] workstation.    COMPARISON: CTA brain and neck 10/21/2023.    FINDINGS:    CTA BRAIN:    Normal flow-related enhancement of the skull base/intracranial internal   carotid arteries.    Multifocal short segment moderate stenoses of the bilateral anterior   cerebral arteries. Multifocal milder stenoses of the distal bilateral MCA   branches.    Otherwise normal flow-related enhancement of the bilateral anterior,   middle, and posterior cerebral arteries.    The right PCA demonstrates a fetal origin.    Normal flow-related enhancement of the bilateral intradural vertebral   arteries and the basilar artery.    The left intradural vertebral arteries hypoplastic.    No flow-limiting stenosis or vascular aneurysm. No AVM.    Venous system is well opacified, no evidence for venous sinus or cortical   vein thrombosis.    CTA NECK:    Normal flow-related enhancement of the bilateral common and internal   carotid arteries.    Normal flow-related enhancement of the bilateral vertebral arteries.    No flow-limiting stenosis, evidence for arterial dissection, or vascular   aneurysm.    IMPRESSION:    CTA brain:  No significant interval change from CTA brain 10/21/2023    Multifocal short segment moderate stenoses of the bilateral anterior   cerebral arteries. Multifocal milder stenoses of the distal bilateral MCA   branches. Findings could represent the presence of atherosclerosis. The   presence of vasculitis is not excluded.    No vascular aneurysm. No AVM.    Venous system is well opacified, no evidence for venous sinus or cortical   vein thrombosis.    CTA neck:  No flow-limiting stenosis, evidence for arterial dissection, or vascular   aneurysm.       Review of Systems : per HPI         Vital Signs Last 24 Hrs  T(C): 36.6 (13 Feb 2024 06:45), Max: 36.6 (12 Feb 2024 16:11)  T(F): 97.8 (13 Feb 2024 06:45), Max: 97.9 (13 Feb 2024 01:19)  HR: 74 (13 Feb 2024 04:00) (68 - 81)  BP: 169/113 (13 Feb 2024 04:00) (144/94 - 219/107)  BP(mean): 136 (13 Feb 2024 04:00) (114 - 145)  RR: 18 (13 Feb 2024 04:00) (14 - 18)  SpO2: 94% (13 Feb 2024 04:00) (93% - 99%)    Parameters below as of 13 Feb 2024 04:00  Patient On (Oxygen Delivery Method): room air            Physical Exam :  58 y o Nepalese speaking man lying comfortably in semi Neff's position , awake , alert , no acute complaints     Head : normocephalic , atraumatic    Eyes : PERRLA , EOMI , no nystagmus , sclera anicteric    ENT / FACE : neg nasal discharge , uvula midline , no oropharyngeal erythema / exudate    Neck : supple , negative JVD , negative carotid bruits , no thyromegaly    Chest : CTA bilaterally , neg wheeze / rhonchi / rales / crackles / egophany    Cardiovascular : regular rate and rhythm , neg murmurs / rubs / gallops    Abdomen : soft , non distended , non tender to palpation in all 4 quadrants ,  normal bowel sounds     Extremities : WWP , neg cyanosis /clubbing / edema     Neurologic Exam :     Alert and oriented to person , place , date/year , speech fluent w/o dysarthria , follows commands , recent and remote memory intact , repetition intact , comprehension intact ,  attention/concentration intact , fund of knowledge appropriate    Cranial Nerves:           II :                         pupils equal , round and reactive to light , visual fields intact         III/ IV/VI :              extraocular movements intact , neg nystagmus , neg ptosis        V :                        facial sensation intact , V1-3 normal        VII :                     slight L droop , normal eye closure and smile        VIII :                     hearing intact to finger rub bilaterally        IX and X :             no hoarseness , gag intact , palate/ uvula rise symmetrically        XI :                       SCM / trapezius strength intact bilateral        XII :                      no tongue deviation       Motor Exam:                Right UE:                     no focal weakness ,  > 4/5 throughout  , no pronator drift     Left UE:                       no focal weakness ,  > 4/5 throughout  , no pronator drift         Right LE:          no focal weakness ,  > 4/5 throughout , no drift , does not hit the bed       Left LE:          no focal weakness ,  > 4/5 throughout , no drift , does not hit the bed       Sensation :         intact to light touch x 4 extremities                            no neglect or extinction on double simultaneous testing    DTR :           biceps/brachioradialis : equal                            patella/ankle : equal           neg Babinski         Coordination :            Finger to Nose :  neg dysmetria bilaterally          Gait :  not tested          PM&R Impression : admitted from the stroke outpatient office for elevated BP.    - no acute pathology on CT brain imaging , MRI pending          Recommendations / Plan :       1) Physical / Occupational therapy focusing on therapeutic exercises , equipment evaluation , bed mobility/transfer out of bed evaluation , progressive ambulation with assistive devices prn .    2) Current disposition plan recommendation  :    pending functional progress       
  Patient is a 58y old  Male who presents with a chief complaint of dizziness    HPI:  58y Male with PMHx of  HTN (noncompliant with meds), prior stroke in the R periatrial white matter and L centrum semiovale 2/2 to large vessel athero s/p DAPT x 90 days who was sent in from the stroke outpatient office for elevated BP. Majority of history translated by family at bedside as patient is primarily Afghan speaking. Reports that he had 2 episodes of transient expressive aphasia, the last one being about 1 month ago. Does not remember when the episode prior was. Family at bedside states that during both of these episodes he would just be making sounds and no words would come out. States that he also has been having room spinning dizziness over the last 1 month, exacerbated with moving his head. Reports that he has been having to use a cane more frequently to help him ambulate 2/2 to his dizziness. Also notes that he ran out of his BP medications 2 weeks ago. Went to an outpatient follow up appointment with Татьяна and was found to have an SBP in the 200s therefore was sent to the ED for further management. Noncon CTH negative for acute pathology, CTA H/N with unchanged multifocal short segment stenosis of b/l EVERARDO and distal b/l MCA   (12 Feb 2024 15:26)    Review of Systems: 12 point review of systems otherwise negative    PAST MEDICAL & SURGICAL HISTORY:  HTN (hypertension)      Stroke    Social History:  nonsmoker  drinks alcohol      MEDICATIONS  (STANDING):  aspirin enteric coated 81 milliGRAM(s) Oral daily  atorvastatin 40 milliGRAM(s) Oral at bedtime  enoxaparin Injectable 40 milliGRAM(s) SubCutaneous every 24 hours  influenza   Vaccine 0.5 milliLiter(s) IntraMuscular once  lisinopril 20 milliGRAM(s) Oral daily    MEDICATIONS  (PRN):  meclizine 12.5 milliGRAM(s) Oral every 8 hours PRN Dizziness      Allergies    No Known Allergies    Intolerances          Vital Signs Last 24 Hrs  T(C): 36.7 (13 Feb 2024 14:00), Max: 36.7 (13 Feb 2024 10:00)  T(F): 98 (13 Feb 2024 14:00), Max: 98 (13 Feb 2024 10:00)  HR: 92 (13 Feb 2024 16:05) (68 - 104)  BP: 140/94 (13 Feb 2024 16:05) (134/98 - 194/120)  BP(mean): 111 (13 Feb 2024 16:05) (111 - 145)  RR: 18 (13 Feb 2024 16:05) (14 - 18)  SpO2: 93% (13 Feb 2024 16:05) (92% - 99%)    Parameters below as of 13 Feb 2024 16:05  Patient On (Oxygen Delivery Method): room air      CAPILLARY BLOOD GLUCOSE          02-12 @ 07:01  -  02-13 @ 07:00  --------------------------------------------------------  IN: 0 mL / OUT: 400 mL / NET: -400 mL        Physical Exam:    Daily     Daily   General:  Well appearing, NAD, not cachetic  HEENT:  Nonicteric, PERRLA  CV:  RRR, no murmur, no JVD  Lungs:  CTA B/L, no wheezes, rales, rhonchi  Abdomen:  Soft, non-tender, no distended, positive BS, no hepatosplenomegaly  Extremities:  2+ pulses, no c/c, no edema  Skin:  Warm and dry, no rashes  :  No miller  Neuro:  AAOx3, non-focal, CN II-XII grossly intact  No Restraints    LABS:                        15.4   6.11  )-----------( 216      ( 13 Feb 2024 05:30 )             44.5     02-13    139  |  103  |  11  ----------------------------<  96  3.6   |  25  |  0.78    Ca    9.2      13 Feb 2024 05:30  Phos  3.3     02-13  Mg     1.8     02-13        Urinalysis Basic - ( 13 Feb 2024 05:30 )    Color: x / Appearance: x / SG: x / pH: x  Gluc: 96 mg/dL / Ketone: x  / Bili: x / Urobili: x   Blood: x / Protein: x / Nitrite: x   Leuk Esterase: x / RBC: x / WBC x   Sq Epi: x / Non Sq Epi: x / Bacteria: x

## 2024-02-13 NOTE — OCCUPATIONAL THERAPY INITIAL EVALUATION ADULT - SENSORY TESTS
Hypertension is well controlled.  Blood pressure is at goal   Cranial Nerves II - XII: II: PERRLA; visual fields are full to confrontation III, IV, VI: EOMI, no nystagmus appreciated V: facial sensation intact to light touch V1-V3 b/l VII: no ptosis, L sided facial droop when smiling, symmetric eyebrow raise and closure VIII: hearing intact to finger rub b/l  XI: head turning and shoulder shrug intact b/l XII: tongue protrusion midline

## 2024-02-13 NOTE — OCCUPATIONAL THERAPY INITIAL EVALUATION ADULT - DIAGNOSIS, OT EVAL
Pt admitted s/p Pt admitted  for c/o HTN and presents with decreased balance and activity tolerance, impacting his independence in ADLs and functional mobility tasks.

## 2024-02-13 NOTE — DISCHARGE NOTE PROVIDER - NSDCCPCAREPLAN_GEN_ALL_CORE_FT
PRINCIPAL DISCHARGE DIAGNOSIS  Diagnosis: Hypertensive urgency  Assessment and Plan of Treatment: Hypertension is the medical term for high blood pressure. Blood pressure refers to the pressure that blood applies to the inner walls of the arteries. Arteries carry blood from the heart to other organs and parts of the body. Untreated high blood pressure increases the strain on the heart and arteries, eventually causing organ damage. High blood pressure increases the risk of heart failure, heart attack (myocardial infarction), stroke, and kidney failure. High blood pressure does not usually cause any symptoms. Treatment of hypertension usually begins with lifestyle changes. Making these lifestyle changes involves little or no risk. Recommended changes often include reducing the amount of salt in your diet, losing weight if you are overweight or obese, avoiding drinking too much alcohol, stopping smoking and exercising at least 30 minutes per day most days of the week. If you are prescribed medication for your hypertension it is important to take these as prescribed to prevent the possible complications of uncontrolled hypertension.      SECONDARY DISCHARGE DIAGNOSES  Diagnosis: Dizziness  Assessment and Plan of Treatment:

## 2024-02-13 NOTE — CONSULT NOTE ADULT - PROBLEM SELECTOR RECOMMENDATION 9
Pt. c/o dizziness in setting of elevated BP; Pt. also reports 2 episodes of transient expressive aphasia over the past few weeks; sx resolved  -- cont. work-up and mgmt per Neuro  -- PT/OT  -- on ASA and statin for h/o CVA

## 2024-02-13 NOTE — PHYSICAL THERAPY INITIAL EVALUATION ADULT - GENERAL OBSERVATIONS, REHAB EVAL
Received supine denies pain +IV hep, EKG. Left as found +RN cristiano aware, call callejas , bed alarm

## 2024-02-13 NOTE — OCCUPATIONAL THERAPY INITIAL EVALUATION ADULT - GENERAL OBSERVATIONS, REHAB EVAL
Pt received semi-supine in bed, +heplock, +EKG, NAD, and agreeable to OT. Cleared by RN to see. Pt received semi-supine in bed, +heplock, +EKG, NAD, and agreeable to OT. Cleared by RADHA Hand to see.

## 2024-02-13 NOTE — DISCHARGE NOTE PROVIDER - NSDCMRMEDTOKEN_GEN_ALL_CORE_FT
aspirin 81 mg oral delayed release tablet: 1 tab(s) orally once a day  Lipitor 40 mg oral tablet: 1 tab(s) orally once a day (at bedtime)  lisinopril 20 mg oral tablet: 1 tab(s) orally once a day  meclizine 12.5 mg oral tablet: 1 tab(s) orally every 8 hours as needed for  dizziness  NIFEdipine 60 mg oral tablet, extended release: 1 tab(s) orally once a day

## 2024-02-13 NOTE — OCCUPATIONAL THERAPY INITIAL EVALUATION ADULT - PERTINENT HX OF CURRENT PROBLEM, REHAB EVAL
58y Male with PMHx of  HTN (noncompliant with meds), prior stroke in the R periatrial white matter and L centrum semiovale 2/2 to large vessel athero s/p DAPT x 90 days who was sent in from the stroke outpatient office for elevated BP. Majority of history translated by family at bedside as patient is primarily Croatian speaking. Reports that he had 2 episodes of transient expressive aphasia, the last one being about 1 month ago. Does not remember when the episode prior was. Family at bedside states that during both of these episodes he would just be making sounds and no words would come out. States that he also has been having room spinning dizziness over the last 1 month, exacerbated with moving his head. Reports that he has been having to use a cane more frequently to help him ambulate 2/2 to his dizziness. Also notes that he ran out of his BP medications 2 weeks ago. Went to an outpatient follow up appointment with Татьяна and was found to have an SBP in the 200s therefore was sent to the ED for further management. Noncon CTH negative for acute pathology, CTA H/N with unchanged multifocal short segment stenosis of b/l EVERARDO and distal b/l MCA

## 2024-02-13 NOTE — DISCHARGE NOTE NURSING/CASE MANAGEMENT/SOCIAL WORK - PATIENT PORTAL LINK FT
You can access the FollowMyHealth Patient Portal offered by [facility] by registering at the following website: [email or web address]. By joining [employer]’s FollowMyHealth portal, you will also be able to view your health information using other applications (apps) compatible with our system.

## 2024-02-13 NOTE — DISCHARGE NOTE NURSING/CASE MANAGEMENT/SOCIAL WORK - NSDCPEFALRISK_GEN_ALL_CORE
For information on Fall & Injury Prevention, visit: [email or web address] OR  [email or web address] OR  https://www.cdc.gov/steadi/patient.html

## 2024-02-13 NOTE — PHYSICAL THERAPY INITIAL EVALUATION ADULT - PERTINENT HX OF CURRENT PROBLEM, REHAB EVAL
58M  who was sent in from the stroke outpatient office for elevated BP. Majority of history translated by family at bedside as patient is primarily Portuguese speaking. Reports that he had 2 episodes of transient expressive aphasia, the last one being about 1 month ago.

## 2024-02-13 NOTE — DISCHARGE NOTE PROVIDER - NSDCFUSCHEDAPPT_GEN_ALL_CORE_FT
[facility] Physician [facility]  [address] [address]  Scheduled Appointment: 02/22/2024

## 2024-02-13 NOTE — CONSULT NOTE ADULT - PROBLEM SELECTOR RECOMMENDATION 2
likely cause of presenting sx; Pt. reports he has not taken his BP meds in ~2 weeks  -- cont. nifedipine, dose increased to 60mg PO daily  -- cont. lisinopril 20mg PO daily  -- DASH diet

## 2024-02-13 NOTE — OCCUPATIONAL THERAPY INITIAL EVALUATION ADULT - ADDITIONAL COMMENTS
Pt states he lives with his cousin in an elevator accessible apartment with 2 LULA. Pt is independent in ADLs and mobility tasks, use of SC. Pt's sister comes to his house to walk around the community with him. Pt's cousin is able to help with IADLs (cooking, cleaning) if needed. Pt is R hand dominant.

## 2024-02-13 NOTE — CONSULT NOTE ADULT - ASSESSMENT
Neurology    58 y o Male with PMHx of  HTN (noncompliant with meds), prior stroke in the R periatrial white matter and L centrum semiovale 2/2 to large vessel athero s/p DAPT x 90 days who was sent in from the stroke outpatient office for elevated BP. Has had two transient episodes of expressive aphasia > 1 month ago and persistent dizziness over the last 1 month. Had an outpatient follow up with our stroke NP and was found to have SBP > 200 therefore was sent to the ED for further management. Noncon CTH negative for acute pathology, CTA H/N with unchanged multifocal short segment stenosis of b/l EVERARDO and distal b/l MCAs. Admitted to stroke tele for further blood pressure control    Neuro  #CVA workup  - continue home aspirin 81mg   - f/u AM PRU  - continue home atorvastatin 40mg daily  - q4hr stroke neuro checks and vitals  - +/- repeat MRI Brain without contrast, ILR interrogation  - ED HCT Results: negative  - ED CTA Results: unchanged multifocal short segment stenosis of b/l EVERARDO and distal b/l MCAs  - Stroke education    Cards  #HTN  - permissive hypertension, Goal -180  - continue home Lisinopril and Nifedipine, uptitrate PRN  - ED EKG Results:    #HLD  - high dose statin as above in CVA  - LDL results: pending    Pulm  - call provider if SPO2 < 94%    GI  #Nutrition/Fluids/Electrolytes   - replete K<4 and Mg <2  - Diet: DASH/TLC  - IVF: None    Renal  - daily BMPs    Infectious Disease  - afebrile on admission    Endocrine  #DM  - A1C results: pending     - TSH results: pending    DVT Prophylaxis  - lovenox sq for DVT prophylaxis   - SCDs for DVT prophylaxis       Dispo: pending PM&R,  PT/OT     Discussed daily hospital plans and goals with patient and family at bedside    Discussed with Neurology Attending, Dr. Briggs

## 2024-02-13 NOTE — DISCHARGE NOTE PROVIDER - CARE PROVIDER_API CALL
Татьяна Shipley NP in Family Health  [address]  [place] [address]  Phone: [phone]  Fax: [phone]  Follow Up Time: 2 weeks

## 2024-02-13 NOTE — PHYSICAL THERAPY INITIAL EVALUATION ADULT - MODALITIES TREATMENT COMMENTS
Facial nerve: drooped L smile; Visual tracking all directions intact; Tongue: midline; Visual quadrants intact

## 2024-02-14 ENCOUNTER — NON-APPOINTMENT (OUTPATIENT)
Age: 59
End: 2024-02-14

## 2024-02-20 DIAGNOSIS — E78.5 HYPERLIPIDEMIA, UNSPECIFIED: ICD-10-CM

## 2024-02-20 DIAGNOSIS — I16.0 HYPERTENSIVE URGENCY: ICD-10-CM

## 2024-02-20 DIAGNOSIS — Z91.148 PATIENT'S OTHER NONCOMPLIANCE WITH MEDICATION REGIMEN FOR OTHER REASON: ICD-10-CM

## 2024-02-20 DIAGNOSIS — R42 DIZZINESS AND GIDDINESS: ICD-10-CM

## 2024-02-20 DIAGNOSIS — Z86.73 PERSONAL HISTORY OF TRANSIENT ISCHEMIC ATTACK (TIA), AND CEREBRAL INFARCTION WITHOUT RESIDUAL DEFICITS: ICD-10-CM

## 2024-02-20 DIAGNOSIS — I10 ESSENTIAL (PRIMARY) HYPERTENSION: ICD-10-CM

## 2024-02-20 DIAGNOSIS — I65.23 OCCLUSION AND STENOSIS OF BILATERAL CAROTID ARTERIES: ICD-10-CM

## 2024-02-22 ENCOUNTER — NON-APPOINTMENT (OUTPATIENT)
Age: 59
End: 2024-02-22

## 2024-02-22 ENCOUNTER — APPOINTMENT (OUTPATIENT)
Dept: HEART AND VASCULAR | Facility: CLINIC | Age: 59
End: 2024-02-22
Payer: COMMERCIAL

## 2024-02-22 PROBLEM — I63.9 CEREBRAL INFARCTION, UNSPECIFIED: Chronic | Status: ACTIVE | Noted: 2024-02-12

## 2024-02-22 PROCEDURE — 93298 REM INTERROG DEV EVAL SCRMS: CPT

## 2024-03-11 ENCOUNTER — APPOINTMENT (OUTPATIENT)
Dept: NEUROLOGY | Facility: CLINIC | Age: 59
End: 2024-03-11
Payer: COMMERCIAL

## 2024-03-11 VITALS
OXYGEN SATURATION: 96 % | BODY MASS INDEX: 29.16 KG/M2 | WEIGHT: 220 LBS | SYSTOLIC BLOOD PRESSURE: 121 MMHG | TEMPERATURE: 96.8 F | DIASTOLIC BLOOD PRESSURE: 85 MMHG | HEIGHT: 73 IN | HEART RATE: 84 BPM

## 2024-03-11 DIAGNOSIS — I63.9 CEREBRAL INFARCTION, UNSPECIFIED: ICD-10-CM

## 2024-03-11 DIAGNOSIS — R42 DIZZINESS AND GIDDINESS: ICD-10-CM

## 2024-03-11 DIAGNOSIS — I10 ESSENTIAL (PRIMARY) HYPERTENSION: ICD-10-CM

## 2024-03-11 PROCEDURE — 99408 AUDIT/DAST 15-30 MIN: CPT

## 2024-03-11 RX ORDER — CLOPIDOGREL BISULFATE 75 MG/1
75 TABLET, FILM COATED ORAL
Qty: 90 | Refills: 3 | Status: ACTIVE | COMMUNITY

## 2024-03-11 RX ORDER — ATORVASTATIN CALCIUM 40 MG/1
40 TABLET, FILM COATED ORAL
Qty: 90 | Refills: 3 | Status: ACTIVE | COMMUNITY

## 2024-03-11 RX ORDER — LISINOPRIL 20 MG/1
20 TABLET ORAL DAILY
Qty: 90 | Refills: 1 | Status: ACTIVE | COMMUNITY
Start: 2024-02-12 | End: 1900-01-01

## 2024-03-11 RX ORDER — ATORVASTATIN CALCIUM 40 MG/1
40 TABLET, FILM COATED ORAL
Qty: 90 | Refills: 3 | Status: ACTIVE | COMMUNITY
Start: 2024-03-11 | End: 1900-01-01

## 2024-03-11 RX ORDER — ASPIRIN 81 MG
81 TABLET, DELAYED RELEASE (ENTERIC COATED) ORAL DAILY
Refills: 0 | Status: ACTIVE | COMMUNITY

## 2024-03-11 NOTE — HISTORY OF PRESENT ILLNESS
[FreeTextEntry1] : Hawk Wilkes is a 58-year-old male with PMH of HTN (noncompliant with medications) who presented to [facility] ED with room spinning dizziness x1 week with head movement with MRI showing new strokes (R periatrial white matter and L centrum semiovale 2/2 ICAD) likely not the cause of his presenting symptoms presents for follow up after recent admission for hypertensive urgency.  Hospital Course 2/12/24-2/13/24: HCT: Negative. CTA H/N: Stable ICAD.  Discharge medications include Nifedipine ER 60 mg daily. Discharge lab work includes .  Since his hospitalization, patient reports no new stroke-like symptoms other than intermittent dizziness episodes. He is tolerating his Aspirin and Atorvastatin without bleeding, bruising or myalgias. He is not sure if he is still taking his Plavix medication. BP today 121/85. He occasionally takes his BP a few times a week with SBPs in the 140s or less. He tries to eat healthy but does not exercise on a regular basis. He has not yet seen a Cardiologist. He has not yet completed physical therapy or vestibular therapy. ILR without arrhythmias since implantation. He does not have a PCP.

## 2024-03-11 NOTE — ASSESSMENT
[FreeTextEntry1] : Hawk Wilkes is a 58-year-old male with PMH of HTN (noncompliant with medications) who presented to [facility] ED with room spinning dizziness x1 week with head movement with MRI showing new strokes (R periatrial white matter and L centrum semiovale 2/2 ICAD) likely not the cause of his presenting symptoms presents for follow up after recent admission for hypertensive urgency. Stroke etiology likely due ICAD vs. cardioembolic source (ICAD more likely than cardioembolic). Dizziness likely secondary to peripheral cause such as BPPV.   Plan: -Continue Aspirin for secondary stroke prevention; only needed Plavix for 90 days -Continue Atorvastatin 40 mg, LDL goal <70 -Medication refills sent to ensure adherence -Stroke labs today including ARU and Lipoprotein A -F/u with EP for ILR interrogation -Cardiology referral for BP management, BP goal <140/90 -Start PT for gait instability -Start VT for dizziness -Counselled on healthy eating (DASH/Mediterranean diet, limiting red meats and fried foods) -Counselled on importance of regular exercise and remaining active -Counselled on f/u with PCP regarding regular health maintenance and prevention, including routine screening -Counselled on signs of stroke BEFAST and to call 911 with any new or worsening neurological symptoms -RTO in 3 months  Other chart with MRN [id number].

## 2024-03-11 NOTE — PHYSICAL EXAM
[FreeTextEntry1] : Alert. Fully oriented. Speech and language are intact. Cranial nerves II-XII are intact. L facial. No nystagmus noted. Motor exam reveals intact strength with individual muscle testing in bilateral upper and lower extremities.  No drift. Tone is normal. Sensation is intact to light touch in distal extremities. Finger-to-nose is intact. Narrow based gait with cane.

## 2024-03-27 ENCOUNTER — APPOINTMENT (OUTPATIENT)
Dept: HEART AND VASCULAR | Facility: CLINIC | Age: 59
End: 2024-03-27

## 2024-05-01 ENCOUNTER — APPOINTMENT (OUTPATIENT)
Dept: HEART AND VASCULAR | Facility: CLINIC | Age: 59
End: 2024-05-01

## 2024-05-01 LAB
ESTIMATED AVERAGE GLUCOSE: 137 MG/DL
HBA1C MFR BLD HPLC: 6.4 %
PA ADP PRP-ACNC: 149 PRU
PLATELET RESPONSE ASPIRIN: 395 ARU

## 2024-05-01 NOTE — PHYSICAL THERAPY INITIAL EVALUATION ADULT - IMPAIRMENTS CONTRIBUTING TO GAIT DEVIATIONS, PT EVAL
I see you did enter it.  We did not receive it though as the INR reminders were not entered to go to us.  I will change it.  Thank you.   +unsteady gait 2/2 dizziness requiring CGA for safety/impaired balance/decreased strength

## 2024-05-06 ENCOUNTER — NON-APPOINTMENT (OUTPATIENT)
Age: 59
End: 2024-05-06

## 2024-05-06 LAB
ALBUMIN SERPL ELPH-MCNC: 4.5 G/DL
ALP BLD-CCNC: 85 U/L
ALT SERPL-CCNC: 75 U/L
ANION GAP SERPL CALC-SCNC: 13 MMOL/L
APO LP(A) SERPL-MCNC: 25.9 NMOL/L
AST SERPL-CCNC: 35 U/L
BILIRUB SERPL-MCNC: 0.6 MG/DL
BUN SERPL-MCNC: 13 MG/DL
CALCIUM SERPL-MCNC: 9.3 MG/DL
CHLORIDE SERPL-SCNC: 104 MMOL/L
CHOLEST SERPL-MCNC: 143 MG/DL
CO2 SERPL-SCNC: 25 MMOL/L
CREAT SERPL-MCNC: 1.05 MG/DL
EGFR: 81 ML/MIN/1.73M2
GLUCOSE SERPL-MCNC: 109 MG/DL
HDLC SERPL-MCNC: 35 MG/DL
LDLC SERPL CALC-MCNC: 87 MG/DL
NONHDLC SERPL-MCNC: 108 MG/DL
POTASSIUM SERPL-SCNC: 4.3 MMOL/L
PROT SERPL-MCNC: 7.6 G/DL
SODIUM SERPL-SCNC: 143 MMOL/L
TRIGL SERPL-MCNC: 116 MG/DL

## 2024-05-07 DIAGNOSIS — Z00.00 ENCOUNTER FOR GENERAL ADULT MEDICAL EXAMINATION W/OUT ABNORMAL FINDINGS: ICD-10-CM

## 2024-05-07 RX ORDER — ASPIRIN ENTERIC COATED TABLETS 81 MG 81 MG/1
81 TABLET, DELAYED RELEASE ORAL DAILY
Qty: 90 | Refills: 3 | Status: ACTIVE | COMMUNITY
Start: 2024-03-11 | End: 1900-01-01

## 2024-05-07 RX ORDER — NIFEDIPINE 60 MG/1
60 TABLET, EXTENDED RELEASE ORAL DAILY
Qty: 90 | Refills: 1 | Status: ACTIVE | COMMUNITY
Start: 2024-03-11 | End: 1900-01-01

## 2024-05-07 RX ORDER — ATORVASTATIN CALCIUM 80 MG/1
80 TABLET, FILM COATED ORAL
Qty: 90 | Refills: 1 | Status: ACTIVE | COMMUNITY
Start: 2024-05-07 | End: 1900-01-01

## 2024-06-05 ENCOUNTER — APPOINTMENT (OUTPATIENT)
Dept: HEART AND VASCULAR | Facility: CLINIC | Age: 59
End: 2024-06-05

## 2024-06-13 ENCOUNTER — APPOINTMENT (OUTPATIENT)
Dept: NEUROLOGY | Facility: CLINIC | Age: 59
End: 2024-06-13

## 2024-06-17 NOTE — ASSESSMENT
[FreeTextEntry1] : Hawk Wilkes is a 58-year-old male with PMH of HTN (noncompliant with medications), admitted for hypertensive urgency in 2/2024, stroke in 10/2023 (R periatrial white matter and L centrum semiovale 2/2 ICAD) and vertigo now presents for neurological follow up.   Plan: -Continue Aspirin 81 mg daily for secondary stroke prevention -Continue Atorvastatin 80 mg, LDL goal <70 -F/u with EP for ILR interrogation -Counselled on healthy eating (DASH/Mediterranean diet, limiting red meats and fried foods) -Counselled on importance of regular exercise and remaining active -Counselled on f/u with PCP regarding regular health maintenance and prevention, including routine screening -Counselled on signs of stroke BEFAST and to call 911 with any new or worsening neurological symptoms -RTO in 3 months

## 2024-06-17 NOTE — HISTORY OF PRESENT ILLNESS
[FreeTextEntry1] : Hawk Wilkes is a 58-year-old male with PMH of HTN (noncompliant with medications), admitted for hypertensive urgency in 2/2024, stroke in 10/2023 (R periatrial white matter and L centrum semiovale 2/2 ICAD) and vertigo now presents for neurological follow up.   Since last visit, patient reports no new stroke-like symptoms. He is tolerating his Aspirin and Atorvastatin without bleeding, bruising or myalgias.  BP: Diet: Exercise: PT/VT: Cardiology: ILR: no A fib PCP:

## 2024-07-08 ENCOUNTER — APPOINTMENT (OUTPATIENT)
Dept: HEART AND VASCULAR | Facility: CLINIC | Age: 59
End: 2024-07-08

## 2024-08-09 ENCOUNTER — APPOINTMENT (OUTPATIENT)
Dept: HEART AND VASCULAR | Facility: CLINIC | Age: 59
End: 2024-08-09

## 2025-02-27 NOTE — PATIENT PROFILE ADULT - VISION (WITH CORRECTIVE LENSES IF THE PATIENT USUALLY WEARS THEM):
Normal vision: sees adequately in most situations; can see medication labels, newsprint
good, to achieve stated therapy goals